# Patient Record
Sex: MALE | Race: BLACK OR AFRICAN AMERICAN | Employment: FULL TIME | URBAN - METROPOLITAN AREA
[De-identification: names, ages, dates, MRNs, and addresses within clinical notes are randomized per-mention and may not be internally consistent; named-entity substitution may affect disease eponyms.]

---

## 2019-01-22 ENCOUNTER — HOSPITAL ENCOUNTER (OUTPATIENT)
Dept: PHYSICAL THERAPY | Age: 54
Discharge: HOME | End: 2019-01-22
Payer: COMMERCIAL

## 2019-01-22 PROCEDURE — 97110 THERAPEUTIC EXERCISES: CPT

## 2019-01-22 PROCEDURE — 97161 PT EVAL LOW COMPLEX 20 MIN: CPT
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
## 2019-01-29 ENCOUNTER — HOSPITAL ENCOUNTER (OUTPATIENT)
Dept: PHYSICAL THERAPY | Age: 54
Discharge: HOME | End: 2019-01-29
Payer: COMMERCIAL

## 2019-01-29 PROCEDURE — 97110 THERAPEUTIC EXERCISES: CPT | Performed by: PHYSICAL THERAPIST

## 2019-01-29 NOTE — PROGRESS NOTES
[facility]  [address], [address] [facility]    OUTPATIENT physical Therapy DAILY Treatment NOTe  Visit: 2    NAME: Bill Daly AGE: 52 y.o. GENDER: male  DATE: 1/29/2019  REFERRING PHYSICIAN: Xavier Burgess, *      GOALS  Short term goals  Time frame: 2 weeks  1. Patient will be compliant and independent with the initial HEP as evidenced by being able to perform without cuing. 2. Patient will report a 50% improvement in symptoms. 3. Patient will have a 10 degree increase in left knee flexion ROM   4. Patient will tolerate 15 minutes of clinic activities before onset of symptoms.      Long term goals  Time frame: 4 weeks  1. Patient will have an improved score on the LEFS outcome measure by 12 points to demonstrate an increase in functional activity tolerance. 2. Patient will be independent in final individualized HEP. 3. Patient will return to work without being limited by symptoms. SUBJECTIVE:   \"It feels tight, but it's a lot better. \"    Pain In: 0/10 left knee    OBJECTIVE DATA SUMMARY:   Objective data from initial evaluation:  EXAMINATION/PRESENTATION/DECISION MAKING:   Pain:  Location: No report of pain in left knee at this time     Posture:   Standing posture WNL, equal wt on each LE     Strength:   Pt reports general feeling of weakness in left LE     Range of Motion:   Left knee   Flexion 105 degrees  Extension 0 degrees     Right knee  Flexion 115 degrees  Extension 0      Spinal Assessment: WNL     Joint Mobility:   Left patella mobility WNL     Palpation:   Not TTP along left knee joint line     Neurologic Assessment:               Tone: WNL               Sensation: WNL               Reflexes: not tested     Special Tests:      Mobility:               Transitional Movements: WNL                Gait: WNL     Balance: WNL     Functional Measure:    The Lower Extremity functional Scale  64/80     TREATMENT/INTERVENTION:  Modalities (Rationale): none     Therapeutic Exercises:  HEP: Prone self flexion mob, prone hip extension, SLR, Adductor stretch, Mini squat with ball squeeze, steps with hip flexion, Hip abduction, extension and flexion with green TB (dispensed)     Exercises in BOLD performed this date:     UBE/LBE: 7 minutes, level 2 for warm-up    Prone left self flexion stretch: 3 reps with 30 second holds  Prone hip extension (with knee bent): 2 sets of 10 reps each  Prone hip extension (with knee straight): 10 reps each    SLR: 2 sets of 10 reps  VMO SLR: 10 reps  Hip Adductor stretch    Terminal knee extension with green TB: 2 sets of 10re  Mini squats with green TB: 2 sets of 10 reps to target of 8 risers to improve form  Step ups on green step with 2 risers: 10 reps  Side step ups on green step with 2 risers: 2 sets of 10 reps  Hip abduction, extension and flexion with green TB: 2 sets of 10 reps each     Manual Therapy:  Patella mob med/lat, sup/inf     Neuro Re-Education:  None this session, could benefit from SLS and balance exercises with left LE    Activity tolerance and post treatment pain report:   Good  Pain Out: 0/10    Education:  Education was provided to the patient on the following topics. []    No changes were made to the home exercise program.  [x]    The following changes were made to the home exercise program: step ups and side step ups, and mini squats with green TB  Patient verbalized understanding of the topics presented. ASSESSMENT:   Patient returns following initial evaluation. Patient with partial tear of left quadriceps tendon after slipping on snow/ice in December. Patient presents with no left knee pain. He just reports tightness now. Patient with good adherence to HEP. Patient required cues for form with some exercises, especially mini squats. Patient with good form after repeated practice. Patient enjoyed UBE/LBE. Patient very motivated to return to work. Expect good progress.      Patients progression toward goals is as follows:  [x]     Improving appropriately and progressing toward goals  []     Improving slowly and progressing toward goals  []     Not making progress toward goals and plan of care will be adjusted    PLAN OF CARE:   Patient continues to benefit from skilled intervention to address the above impairments. [x]    Continue treatment per established plan of care.   []     Recommend the following changes to the plan of care    Recommendations/Intent for next treatment: side steps with TB    Tone Fitzgerald, PT   Time Calculation: 46 mins  Patient Time in clinic:   Start Time: 0930   Stop Time: [phone]

## 2019-02-05 ENCOUNTER — HOSPITAL ENCOUNTER (OUTPATIENT)
Dept: PHYSICAL THERAPY | Age: 54
Discharge: HOME | End: 2019-02-05
Payer: COMMERCIAL

## 2019-02-05 PROCEDURE — 97110 THERAPEUTIC EXERCISES: CPT

## 2019-02-05 NOTE — PROGRESS NOTES
[facility] (formerly [facility])  [address], [address] [facility]    OUTPATIENT physical Therapy DAILY Treatment NOTe  Visit: 3    NAME: Bill Daly AGE: 52 y.o. GENDER: male  DATE: 2/5/2019  REFERRING PHYSICIAN: Xavier Burgess, *      GOALS  Short term goals  Time frame: 2 weeks  1. Patient will be compliant and independent with the initial HEP as evidenced by being able to perform without cuing. 2. Patient will report a 50% improvement in symptoms. 3. Patient will have a 10 degree increase in left knee flexion ROM   4. Patient will tolerate 15 minutes of clinic activities before onset of symptoms.      Long term goals  Time frame: 4 weeks  1. Patient will have an improved score on the LEFS outcome measure by 12 points to demonstrate an increase in functional activity tolerance. 2. Patient will be independent in final individualized HEP. 3. Patient will return to work without being limited by symptoms. SUBJECTIVE:   \"It feels tight, but it's a lot better. \"    Pain In: 0/10 left knee    OBJECTIVE DATA SUMMARY:   Objective data from initial evaluation:  EXAMINATION/PRESENTATION/DECISION MAKING:   Pain:  Location: No report of pain in left knee at this time     Posture:   Standing posture WNL, equal wt on each LE     Strength:   Pt reports general feeling of weakness in left LE     Range of Motion:   Left knee   Flexion 105 degrees  Extension 0 degrees     Right knee  Flexion 115 degrees  Extension 0      Spinal Assessment: WNL     Joint Mobility:   Left patella mobility WNL     Palpation:   Not TTP along left knee joint line     Neurologic Assessment:               Tone: WNL               Sensation: WNL               Reflexes: not tested     Special Tests:      Mobility:               Transitional Movements: WNL                Gait: WNL     Balance: WNL     Functional Measure:    The Lower Extremity functional Scale  64/80     TREATMENT/INTERVENTION:  Modalities (Rationale): none     Therapeutic Exercises:  HEP: Prone self flexion mob, prone hip extension, SLR, Adductor stretch, Mini squat with ball squeeze, steps with hip flexion, Hip abduction, extension and flexion with green TB (dispensed)     Exercises in BOLD performed this date:     UBE/LBE: 8 minutes, level 2 for warm-up    Prone left self flexion stretch: 3 reps with 30 second holds  Prone hip extension (with knee bent): 2 sets of 10 reps each  Prone hip extension (with knee straight): 10 reps each    SLR: 2 sets of 10 reps  VMO SLR: 10 reps  Hip Adductor stretch    Seated   LAQ 2# x 10 reps    Terminal knee extension with green TB: 2 sets of 10re  Mini squats with green TB: 2 sets of 10 reps to target of 8 risers to improve form  Step ups on green step with 2 risers: 10 reps  Side step ups on green step with 2 risers: 2 sets of 10 reps  Hip abduction, extension and flexion with green TB: 2 sets of 10 reps each     Manual Therapy:  Patella mob med/lat, sup/inf     Neuro Re-Education:  None this session, could benefit from SLS and balance exercises with left LE    Activity tolerance and post treatment pain report:   Good  Pain Out: 0/10    Education:  Education was provided to the patient on the following topics. []    No changes were made to the home exercise program.  [x]    The following changes were made to the home exercise program: step ups and side step ups, and mini squats with green TB  Patient verbalized understanding of the topics presented. ASSESSMENT:    Patient with partial tear of left quadriceps tendon after slipping on snow/ice in December. Patient presents with no left knee pain, just reports tightness. . Patient with good adherence to HEP, he required cues for form with some exercises, especially mini squats. Patient enjoyed UBE/LBE. Patient very motivated to return to work.  Pt to come to Therapy one more time this week  and then check with Dr about possible RTW    Patients progression toward goals is as follows:  [x]     Improving appropriately and progressing toward goals  []     Improving slowly and progressing toward goals  []     Not making progress toward goals and plan of care will be adjusted    PLAN OF CARE:   Patient continues to benefit from skilled intervention to address the above impairments. [x]    Continue treatment per established plan of care.   []     Recommend the following changes to the plan of care    Recommendations/Intent for next treatment: Increase repetitions of exercises    Howard Rodarte PT   Time Calculation: 55 mins  Patient Time in clinic:   Start Time: 0930   Stop Time: 7500

## 2019-02-08 ENCOUNTER — HOSPITAL ENCOUNTER (OUTPATIENT)
Dept: PHYSICAL THERAPY | Age: 54
Discharge: HOME | End: 2019-02-08
Payer: COMMERCIAL

## 2019-02-08 PROCEDURE — 97110 THERAPEUTIC EXERCISES: CPT | Performed by: PHYSICAL THERAPIST

## 2019-02-08 NOTE — PROGRESS NOTES
[facility]  [address], [address] [facility]    OUTPATIENT physical Therapy DAILY Treatment NOTe  Visit: 4    NAME: Bill Daly AGE: 52 y.o. GENDER: male  DATE: 2/8/2019  REFERRING PHYSICIAN: Xavier Burgess, *      GOALS  Short term goals  Time frame: 2 weeks  1. Patient will be compliant and independent with the initial HEP as evidenced by being able to perform without cuing. MET  2. Patient will report a 50% improvement in symptoms. MET  3. Patient will have a 10 degree increase in left knee flexion ROM  MET  4. Patient will tolerate 15 minutes of clinic activities before onset of symptoms. MET     Long term goals  Time frame: 4 weeks  1. Patient will have an improved score on the LEFS outcome measure by 12 points to demonstrate an increase in functional activity tolerance. MET  2. Patient will be independent in final individualized HEP. MET  3. Patient will return to work without being limited by symptoms. MET       SUBJECTIVE:   \"It feels good. It's not painful or tight anymore. \"    Pain In: 0/10 left knee    OBJECTIVE DATA SUMMARY:     EXAMINATION/PRESENTATION/DECISION MAKING:   Pain:  Location: No report of pain in left knee at this time     Posture:   Equal weightbearing through BLE     Strength:      LEFT  RIGHT  Knee flexion  5/5  5/5  Knee extension  5/5  5/5     Range of Motion:   Left knee AAROM in supine:  Flexion: 130 degrees  Extension: 0 degrees     Right knee AAROM in supine:  Flexion: 130 degrees  Extension: 0 degrees     Spinal Assessment: WNL     Joint Mobility:   Left patella mobility WNL     Palpation:   No tenderness along left knee joint line     Neurologic Assessment:               Tone: WNL               Sensation: WNL               Reflexes: not tested     Mobility:               Transitional Movements: WNL                Gait: WNL     Balance:   WNL     Functional Measure:   LEFS: 64/80 on evaluation  LEFS: 80/80 on discharge     TREATMENT/INTERVENTION:  Modalities (Rationale): none     Therapeutic Exercises:  HEP: Prone self flexion mob, prone hip extension, SLR, Adductor stretch, Mini squat with ball squeeze, steps with hip flexion, Hip abduction, extension and flexion with green TB (dispensed)     Exercises in BOLD performed this date:     UBE/LBE: 8 minutes, level 2 for warm-up    Prone left self flexion stretch: 3 reps with 30 second holds  Prone hip extension (with knee bent): 2 sets of 10 reps each  Prone hip extension (with knee straight): 10 reps each    SLR: 2 sets of 10 reps  VMO SLR: 10 reps  Hip Adductor stretch    Seated   LAQ 2# x 10 reps    Terminal knee extension with blueTB: 2 sets of 10 reps  Mini squats with green TB: 2 sets of 10 reps to target of 8 risers to improve form  Step ups on green step with 2 risers: 10 reps  Side step ups on green step with 2 risers: 2 sets of 10 reps  Hip abduction, extension and flexion with green TB: 2 sets of 10 reps each     Manual Therapy:  Patella mob med/lat, sup/inf     Activity tolerance and post treatment pain report:   Good  Pain Out: 0/10    Education:  Education was provided to the patient on the following topics. []    No changes were made to the home exercise program.  [x]    The following changes were made to the home exercise program: continue HEP  Patient verbalized understanding of the topics presented. ASSESSMENT:   Patient with partial tear of left quadriceps tendon after slipping on snow/ice in December. He has completed 4 visits since evaluation on 1/22/19. Patient presents with no left knee pain or tightness today. Patient with good adherence to HEP. Patient instructed to continue HEP and icing at home, but discontinue squats due to no carryover with proper form. Patient remains very motivated to return to work. Patient to return to doctor next week about return to work, and call PT clinic about update.     Patients progression toward goals is as follows:  [x]     Improving appropriately and progressing toward goals  []     Improving slowly and progressing toward goals  []     Not making progress toward goals and plan of care will be adjusted    PLAN OF CARE:   Patient continues to benefit from skilled intervention to address the above impairments. [x]    Continue treatment per established plan of care.   []     Recommend the following changes to the plan of care    Recommendations/Intent for next treatment: patient to call if returning to work     Ana Laura Trivedi, PT   Time Calculation: 28 mins  Patient Time in clinic:   Start Time: [address]   Stop Time: [phone]

## 2019-04-22 NOTE — PROGRESS NOTES
[facility]  [address], [address] [facility]    OUTPATIENT physical Therapy discharge note      4/22/2019:  Patient will be discharged from physical therapy at this time. Criteria for termination of care:    []           Patient has plateaued  []           Patient has not returned to therapy  []           Patient has missed three or more visits without prior notification  [x]           Other: no pain; returning to work    Patient was seen for 4 visits from 1/22/19 to 2/8/19. Please refer to the most recent progress note for the latest PT info available. If you need anything further faxed to you, please contact us at [phone].     Thank you for this referral.  Bob Farfan, PT

## 2019-04-22 NOTE — PROGRESS NOTES
[address] OUTPATIENT physical Therapy discharge note 4/22/2019: 
Patient will be discharged from physical therapy at this time. Criteria for termination of care: 
 
[]           Patient has plateaued 
[]           Patient has not returned to therapy 
[]           Patient has missed three or more visits without prior notification 
[x]           Other: no pain; returning to work Patient was seen for 4 visits from 1/22/19 to 2/8/19. Please refer to the most recent progress note for the latest PT info available. If you need anything further faxed to you, please contact us at [phone].  
 
Thank you for this referral. 
Jason Hunter, PT

## 2019-10-31 ENCOUNTER — APPOINTMENT (OUTPATIENT)
Dept: CT IMAGING | Age: 54
End: 2019-10-31
Attending: STUDENT IN AN ORGANIZED HEALTH CARE EDUCATION/TRAINING PROGRAM
Payer: COMMERCIAL

## 2019-10-31 ENCOUNTER — HOSPITAL ENCOUNTER (OUTPATIENT)
Age: 54
Setting detail: OBSERVATION
Discharge: HOME | End: 2019-11-02
Attending: STUDENT IN AN ORGANIZED HEALTH CARE EDUCATION/TRAINING PROGRAM | Admitting: HOSPITALIST
Payer: COMMERCIAL

## 2019-10-31 DIAGNOSIS — R47.89 WORD FINDING DIFFICULTY: Primary | ICD-10-CM

## 2019-10-31 DIAGNOSIS — G40.109 LOCALZ-RLTD SYMPTOMATIC EPILEPSY W SMPL PART SZ, NONINTRACT, WO STATUS (HCC): ICD-10-CM

## 2019-10-31 DIAGNOSIS — Z87.820 HISTORY OF TRAUMATIC BRAIN INJURY: ICD-10-CM

## 2019-10-31 DIAGNOSIS — G81.91 RIGHT HEMIPLEGIA (HCC): ICD-10-CM

## 2019-10-31 DIAGNOSIS — R47.1 DYSARTHRIA: ICD-10-CM

## 2019-10-31 LAB
ALBUMIN SERPL-MCNC: 4 G/DL (ref 3.5–5)
ALBUMIN/GLOB SERPL: 1 {RATIO} (ref 1.1–2.2)
ALP SERPL-CCNC: 75 U/L (ref 45–117)
ALT SERPL-CCNC: 51 U/L (ref 12–78)
ANION GAP SERPL CALC-SCNC: 6 MMOL/L (ref 5–15)
AST SERPL-CCNC: 31 U/L (ref 15–37)
BASOPHILS # BLD: 0.1 K/UL (ref 0–0.1)
BASOPHILS NFR BLD: 1 % (ref 0–1)
BILIRUB SERPL-MCNC: 0.5 MG/DL (ref 0.2–1)
BUN SERPL-MCNC: 10 MG/DL (ref 6–20)
BUN/CREAT SERPL: 10 (ref 12–20)
CALCIUM SERPL-MCNC: 9.1 MG/DL (ref 8.5–10.1)
CHLORIDE SERPL-SCNC: 105 MMOL/L (ref 97–108)
CO2 SERPL-SCNC: 29 MMOL/L (ref 21–32)
CREAT SERPL-MCNC: 0.98 MG/DL (ref 0.7–1.3)
DIFFERENTIAL METHOD BLD: ABNORMAL
EOSINOPHIL # BLD: 0.1 K/UL (ref 0–0.4)
EOSINOPHIL NFR BLD: 1 % (ref 0–7)
ERYTHROCYTE [DISTWIDTH] IN BLOOD BY AUTOMATED COUNT: 12 % (ref 11.5–14.5)
GLOBULIN SER CALC-MCNC: 4.2 G/DL (ref 2–4)
GLUCOSE BLD STRIP.AUTO-MCNC: 87 MG/DL (ref 65–100)
GLUCOSE SERPL-MCNC: 83 MG/DL (ref 65–100)
HCT VFR BLD AUTO: 42.2 % (ref 36.6–50.3)
HGB BLD-MCNC: 14.3 G/DL (ref 12.1–17)
IMM GRANULOCYTES # BLD AUTO: 0 K/UL (ref 0–0.04)
IMM GRANULOCYTES NFR BLD AUTO: 0 % (ref 0–0.5)
INR PPP: 1.1 (ref 0.9–1.1)
LYMPHOCYTES # BLD: 2.6 K/UL (ref 0.8–3.5)
LYMPHOCYTES NFR BLD: 42 % (ref 12–49)
MCH RBC QN AUTO: 34.1 PG (ref 26–34)
MCHC RBC AUTO-ENTMCNC: 33.9 G/DL (ref 30–36.5)
MCV RBC AUTO: 100.7 FL (ref 80–99)
MONOCYTES # BLD: 0.7 K/UL (ref 0–1)
MONOCYTES NFR BLD: 11 % (ref 5–13)
NEUTS SEG # BLD: 2.8 K/UL (ref 1.8–8)
NEUTS SEG NFR BLD: 45 % (ref 32–75)
NRBC # BLD: 0 K/UL (ref 0–0.01)
NRBC BLD-RTO: 0 PER 100 WBC
PLATELET # BLD AUTO: 163 K/UL (ref 150–400)
PMV BLD AUTO: 10 FL (ref 8.9–12.9)
POTASSIUM SERPL-SCNC: 3.6 MMOL/L (ref 3.5–5.1)
PROT SERPL-MCNC: 8.2 G/DL (ref 6.4–8.2)
PROTHROMBIN TIME: 11.3 SEC (ref 9–11.1)
RBC # BLD AUTO: 4.19 M/UL (ref 4.1–5.7)
SERVICE CMNT-IMP: NORMAL
SODIUM SERPL-SCNC: 140 MMOL/L (ref 136–145)
WBC # BLD AUTO: 6.1 K/UL (ref 4.1–11.1)

## 2019-10-31 PROCEDURE — 36415 COLL VENOUS BLD VENIPUNCTURE: CPT

## 2019-10-31 PROCEDURE — 82962 GLUCOSE BLOOD TEST: CPT

## 2019-10-31 PROCEDURE — 80053 COMPREHEN METABOLIC PANEL: CPT

## 2019-10-31 PROCEDURE — 99218 HC RM OBSERVATION: CPT

## 2019-10-31 PROCEDURE — 74011636320 HC RX REV CODE- 636/320: Performed by: RADIOLOGY

## 2019-10-31 PROCEDURE — 85610 PROTHROMBIN TIME: CPT

## 2019-10-31 PROCEDURE — 74011250636 HC RX REV CODE- 250/636: Performed by: STUDENT IN AN ORGANIZED HEALTH CARE EDUCATION/TRAINING PROGRAM

## 2019-10-31 PROCEDURE — 83036 HEMOGLOBIN GLYCOSYLATED A1C: CPT

## 2019-10-31 PROCEDURE — 70496 CT ANGIOGRAPHY HEAD: CPT

## 2019-10-31 PROCEDURE — 74011250637 HC RX REV CODE- 250/637: Performed by: HOSPITALIST

## 2019-10-31 PROCEDURE — 0042T CT CODE NEURO PERF W CBF: CPT

## 2019-10-31 PROCEDURE — 93005 ELECTROCARDIOGRAM TRACING: CPT

## 2019-10-31 PROCEDURE — 70450 CT HEAD/BRAIN W/O DYE: CPT

## 2019-10-31 PROCEDURE — 74011000258 HC RX REV CODE- 258: Performed by: RADIOLOGY

## 2019-10-31 PROCEDURE — 99285 EMERGENCY DEPT VISIT HI MDM: CPT

## 2019-10-31 PROCEDURE — 80061 LIPID PANEL: CPT

## 2019-10-31 PROCEDURE — 96374 THER/PROPH/DIAG INJ IV PUSH: CPT

## 2019-10-31 PROCEDURE — 85025 COMPLETE CBC W/AUTO DIFF WBC: CPT

## 2019-10-31 PROCEDURE — 94762 N-INVAS EAR/PLS OXIMTRY CONT: CPT

## 2019-10-31 RX ORDER — DIPHENHYDRAMINE HYDROCHLORIDE 50 MG/ML
25 INJECTION, SOLUTION INTRAMUSCULAR; INTRAVENOUS
Status: COMPLETED | OUTPATIENT
Start: 2019-10-31 | End: 2019-10-31

## 2019-10-31 RX ORDER — ACETAMINOPHEN 325 MG/1
650 TABLET ORAL
Status: DISCONTINUED | OUTPATIENT
Start: 2019-10-31 | End: 2019-11-02 | Stop reason: HOSPADM

## 2019-10-31 RX ORDER — LORAZEPAM 1 MG/1
4 TABLET ORAL
Status: DISCONTINUED | OUTPATIENT
Start: 2019-10-31 | End: 2019-11-02 | Stop reason: HOSPADM

## 2019-10-31 RX ORDER — ASPIRIN 81 MG/1
81 TABLET ORAL DAILY
COMMUNITY
End: 2022-05-22

## 2019-10-31 RX ORDER — SODIUM CHLORIDE 0.9 % (FLUSH) 0.9 %
10 SYRINGE (ML) INJECTION
Status: COMPLETED | OUTPATIENT
Start: 2019-10-31 | End: 2019-10-31

## 2019-10-31 RX ORDER — ACETAMINOPHEN 650 MG/1
650 SUPPOSITORY RECTAL
Status: DISCONTINUED | OUTPATIENT
Start: 2019-10-31 | End: 2019-11-02 | Stop reason: HOSPADM

## 2019-10-31 RX ORDER — LABETALOL HYDROCHLORIDE 5 MG/ML
5 INJECTION, SOLUTION INTRAVENOUS
Status: DISCONTINUED | OUTPATIENT
Start: 2019-10-31 | End: 2019-11-02 | Stop reason: HOSPADM

## 2019-10-31 RX ORDER — GUAIFENESIN 100 MG/5ML
81 LIQUID (ML) ORAL DAILY
Status: DISCONTINUED | OUTPATIENT
Start: 2019-10-31 | End: 2019-11-02 | Stop reason: HOSPADM

## 2019-10-31 RX ORDER — LORAZEPAM 1 MG/1
2 TABLET ORAL
Status: DISCONTINUED | OUTPATIENT
Start: 2019-10-31 | End: 2019-11-02 | Stop reason: HOSPADM

## 2019-10-31 RX ORDER — LANOLIN ALCOHOL/MO/W.PET/CERES
100 CREAM (GRAM) TOPICAL DAILY
Status: DISCONTINUED | OUTPATIENT
Start: 2019-10-31 | End: 2019-11-02 | Stop reason: HOSPADM

## 2019-10-31 RX ADMIN — IOPAMIDOL 120 ML: 755 INJECTION, SOLUTION INTRAVENOUS at 18:40

## 2019-10-31 RX ADMIN — Medication 10 ML: at 18:40

## 2019-10-31 RX ADMIN — DIPHENHYDRAMINE HYDROCHLORIDE 25 MG: 50 INJECTION, SOLUTION INTRAMUSCULAR; INTRAVENOUS at 19:26

## 2019-10-31 RX ADMIN — SODIUM CHLORIDE 100 ML: 900 INJECTION, SOLUTION INTRAVENOUS at 18:40

## 2019-10-31 RX ADMIN — Medication 100 MG: at 22:33

## 2019-10-31 RX ADMIN — ASPIRIN 81 MG 81 MG: 81 TABLET ORAL at 22:34

## 2019-10-31 NOTE — LETTER
[address] [id number] [phone] Work/School Note Date: 10/31/2019 To Whom It May concern: 
 
Bill Daly was seen and treated today in the emergency room by the following provider(s): 
Attending Provider: Kristopher Huerta MD.   
Sincerely, José Miguel Cervantes RN

## 2019-10-31 NOTE — ED PROVIDER NOTES
62 y.o. Right-handed male with unknown past medical history who presents from home via private vehicle accompanied by wife with chief complaint of difficulty with speech. Per wife, pt had trouble finding his words while on the phone with him this morning at 1130. Yesterday when she spoke with him on the phone at 1800 his speech was normal. She also reports that pt had abnormal gait a few days ago, noting that he is leaning to the right when he walks. Pt states that he has not noted any changes. Specifically denies any other pain or symptoms. There are no other acute medical concerns at this time. Social hx: Never tobacco smoker; Denies EtOH use; Denies illicit drug use    Note written by Laura Richards, as dictated by Aron Romano MD 6:25 PM    The history is provided by the patient and the spouse. No  was used. History reviewed. No pertinent past medical history. Past Surgical History:   Procedure Laterality Date    NEUROLOGICAL PROCEDURE UNLISTED           History reviewed. No pertinent family history.     Social History     Socioeconomic History    Marital status:      Spouse name: Not on file    Number of children: Not on file    Years of education: Not on file    Highest education level: Not on file   Occupational History    Not on file   Social Needs    Financial resource strain: Not on file    Food insecurity:     Worry: Not on file     Inability: Not on file    Transportation needs:     Medical: Not on file     Non-medical: Not on file   Tobacco Use    Smoking status: Never Smoker    Smokeless tobacco: Never Used   Substance and Sexual Activity    Alcohol use: No    Drug use: No    Sexual activity: Not on file   Lifestyle    Physical activity:     Days per week: Not on file     Minutes per session: Not on file    Stress: Not on file   Relationships    Social connections:     Talks on phone: Not on file     Gets together: Not on file Attends Latter day service: Not on file     Active member of club or organization: Not on file     Attends meetings of clubs or organizations: Not on file     Relationship status: Not on file    Intimate partner violence:     Fear of current or ex partner: Not on file     Emotionally abused: Not on file     Physically abused: Not on file     Forced sexual activity: Not on file   Other Topics Concern    Not on file   Social History Narrative    Not on file         ALLERGIES: Patient has no known allergies. Review of Systems   Constitutional: Negative for chills, fatigue and fever. HENT: Negative for ear pain, sore throat and trouble swallowing. Eyes: Negative for visual disturbance. Respiratory: Negative for cough and shortness of breath. Cardiovascular: Negative for chest pain. Gastrointestinal: Negative for abdominal pain. Genitourinary: Negative for dysuria. Musculoskeletal: Positive for gait problem. Negative for back pain. Skin: Negative for rash. Neurological: Positive for speech difficulty. Negative for light-headedness and headaches. Psychiatric/Behavioral: Negative for confusion. All other systems reviewed and are negative. Vitals:    10/31/19 1819   BP: (!) 155/91   Pulse: 77   Resp: 18   SpO2: 97%            Physical Exam   Constitutional: He appears well-developed. HENT:   Head: Normocephalic and atraumatic. Eyes: EOM are normal.   Neck: No neck rigidity. Cardiovascular: Intact distal pulses. Pulmonary/Chest: Effort normal.   Abdominal: He exhibits no distension. There is no tenderness. Musculoskeletal: He exhibits no edema. Neurological: He is alert. No cranial nerve deficit. Right facial droop, occasional word finding difficulty, slight weakness with active range of motion of his right upper extremity greater than his right lower extremity. Normal sensation light touch. Normal finger-to-nose and heel shin testing. Skin: Skin is warm.  He is not diaphoretic. Psychiatric: He has a normal mood and affect. Nursing note and vitals reviewed. MDM  Number of Diagnoses or Management Options  Right hemiplegia ([address]): Word finding difficulty:   Diagnosis management comments: Patient with poor medical follow-up, no known medical issues presenting with right facial droop, right hemiplegia and word finding difficulty. No evidence of hemorrhage or large vessel occlusion. Plan to admit for further evaluation. Procedures      CONSULT NOTE:  6:39 PM Eileen Mackenzie MD spoke with Dr. Danis Dutton, Consult for Teleneurology. Discussed available diagnostic tests and clinical findings. Dr. Danis Dutton states that pt is outside of the window for TPA. Hospitalist Fernando Text for Admission  8:10 PM    ED Room Number: ER18/18  Patient Name and age:  Dane Teague 62 y.o.  male  Working Diagnosis:   1. Word finding difficulty    2.  Right hemiplegia ([address]      Readmission: no  Isolation Requirements:  no  Recommended Level of Care:  telemetry  Code Status:  full  Other:    Department:[facility] Adult ED - [phone]

## 2019-10-31 NOTE — ED TRIAGE NOTES
Pt reports he woke up normal and at 11:30 he had a conversation over the phone with his wife and she noticed he was having difficulty finding his words.

## 2019-11-01 ENCOUNTER — APPOINTMENT (OUTPATIENT)
Dept: NON INVASIVE DIAGNOSTICS | Age: 54
End: 2019-11-01
Attending: HOSPITALIST
Payer: COMMERCIAL

## 2019-11-01 LAB
ATRIAL RATE: 63 BPM
AV VELOCITY RATIO: 0.67
CALCULATED P AXIS, ECG09: 28 DEGREES
CALCULATED R AXIS, ECG10: 33 DEGREES
CALCULATED T AXIS, ECG11: 20 DEGREES
CHOLEST SERPL-MCNC: 167 MG/DL
DIAGNOSIS, 93000: NORMAL
ECHO AO ROOT DIAM: 3.25 CM
ECHO AV AREA PEAK VELOCITY: 2.5 CM2
ECHO AV PEAK GRADIENT: 4.4 MMHG
ECHO AV PEAK VELOCITY: 105.1 CM/S
ECHO LA AREA 4C: 18.2 CM2
ECHO LA MAJOR AXIS: 4.14 CM
ECHO LA TO AORTIC ROOT RATIO: 1.28
ECHO LA VOL 2C: 45.81 ML (ref 18–58)
ECHO LA VOL 4C: 43.74 ML (ref 18–58)
ECHO LA VOL BP: 47.4 ML (ref 18–58)
ECHO LA VOL/BSA BIPLANE: 22.9 ML/M2 (ref 16–28)
ECHO LA VOLUME INDEX A2C: 22.13 ML/M2 (ref 16–28)
ECHO LA VOLUME INDEX A4C: 21.13 ML/M2 (ref 16–28)
ECHO LV E' LATERAL VELOCITY: 7.42 CM/S
ECHO LV E' SEPTAL VELOCITY: 8.12 CM/S
ECHO LV INTERNAL DIMENSION DIASTOLIC: 5.01 CM (ref 4.2–5.9)
ECHO LV INTERNAL DIMENSION SYSTOLIC: 3.36 CM
ECHO LV IVSD: 0.93 CM (ref 0.6–1)
ECHO LV MASS 2D: 190.9 G (ref 88–224)
ECHO LV MASS INDEX 2D: 92.2 G/M2 (ref 49–115)
ECHO LV POSTERIOR WALL DIASTOLIC: 0.92 CM (ref 0.6–1)
ECHO LVOT DIAM: 2.17 CM
ECHO LVOT PEAK GRADIENT: 2 MMHG
ECHO LVOT PEAK VELOCITY: 70.24 CM/S
ECHO MV A VELOCITY: 42.77 CM/S
ECHO MV AREA PHT: 3.5 CM2
ECHO MV E DECELERATION TIME (DT): 214.4 MS
ECHO MV E VELOCITY: 46 CM/S
ECHO MV E/A RATIO: 1.08
ECHO MV E/E' LATERAL: 6.2
ECHO MV E/E' RATIO (AVERAGED): 5.93
ECHO MV E/E' SEPTAL: 5.67
ECHO MV PRESSURE HALF TIME (PHT): 62.2 MS
ECHO PV MAX VELOCITY: 111.5 CM/S
ECHO PV PEAK GRADIENT: 5 MMHG
ECHO RV TAPSE: 1.69 CM (ref 1.5–2)
EST. AVERAGE GLUCOSE BLD GHB EST-MCNC: 117 MG/DL
HBA1C MFR BLD: 5.7 % (ref 4.2–6.3)
HDLC SERPL-MCNC: 66 MG/DL
HDLC SERPL: 2.5 {RATIO} (ref 0–5)
LDLC SERPL CALC-MCNC: 72.4 MG/DL (ref 0–100)
LIPID PROFILE,FLP: NORMAL
LVFS 2D: 32.85 %
MV DEC SLOPE: 2.15
P-R INTERVAL, ECG05: 162 MS
PV END DIASTOLIC VELOCITY: 0.6 MMHG
Q-T INTERVAL, ECG07: 396 MS
QRS DURATION, ECG06: 88 MS
QTC CALCULATION (BEZET), ECG08: 405 MS
TRIGL SERPL-MCNC: 143 MG/DL (ref ?–150)
VENTRICULAR RATE, ECG03: 63 BPM
VLDLC SERPL CALC-MCNC: 28.6 MG/DL

## 2019-11-01 PROCEDURE — 97161 PT EVAL LOW COMPLEX 20 MIN: CPT

## 2019-11-01 PROCEDURE — 93306 TTE W/DOPPLER COMPLETE: CPT

## 2019-11-01 PROCEDURE — 97535 SELF CARE MNGMENT TRAINING: CPT

## 2019-11-01 PROCEDURE — 99218 HC RM OBSERVATION: CPT

## 2019-11-01 PROCEDURE — 95816 EEG AWAKE AND DROWSY: CPT | Performed by: PSYCHIATRY & NEUROLOGY

## 2019-11-01 PROCEDURE — 74011250637 HC RX REV CODE- 250/637: Performed by: HOSPITALIST

## 2019-11-01 PROCEDURE — 97116 GAIT TRAINING THERAPY: CPT

## 2019-11-01 PROCEDURE — 92610 EVALUATE SWALLOWING FUNCTION: CPT

## 2019-11-01 PROCEDURE — 97165 OT EVAL LOW COMPLEX 30 MIN: CPT

## 2019-11-01 PROCEDURE — 74011250637 HC RX REV CODE- 250/637: Performed by: PSYCHIATRY & NEUROLOGY

## 2019-11-01 RX ORDER — LEVETIRACETAM 500 MG/1
500 TABLET ORAL 2 TIMES DAILY
Status: DISCONTINUED | OUTPATIENT
Start: 2019-11-01 | End: 2019-11-02 | Stop reason: HOSPADM

## 2019-11-01 RX ADMIN — Medication 100 MG: at 21:26

## 2019-11-01 RX ADMIN — LEVETIRACETAM 500 MG: 500 TABLET ORAL at 10:00

## 2019-11-01 RX ADMIN — LEVETIRACETAM 500 MG: 500 TABLET ORAL at 18:14

## 2019-11-01 NOTE — PROGRESS NOTES
Speech Pathology bedside swallow evaluation/discharge  Patient: Peng Mantilla (58 y.o. male)  Date: 11/1/2019  Primary Diagnosis: Dysarthria [R47.1]       Precautions: n/a       ASSESSMENT :  Based on the objective data described below, the patient presents with functional oropharyngeal phases of swallow. Pt without overt difficulty/signs and symptoms of aspiration at bedside, and pt has been tolerating diet without significant difficulty. Patient does report that he feels that he sometimes chokes on food 2/2 globus endorsed at the level of the thyroid. States that it only happens sporadically. Therefore, educated pt on esophageal precautions and encouraged pt to pursue outpatient GI should situation worsen. Feel pt safe to continue diet as outlined below with general esophageal precautions. No speech, language, nor integrated language deficits appreciated. Skilled acute therapy provided by a speech-language pathologist is not indicated at this time. PLAN :  Recommendations:  --regular diet/thin liquids  --small sips/bites  --alternate liquids/solids  --fully upright during meals  --extra sauces, gravies, etc to soften foods  --follow-up outpatient GI if difficulties persist or worsen  Discharge Recommendations: None     SUBJECTIVE:   Patient stated, \"Oh man, I thought you were the person who would help me get up to walk. OBJECTIVE:   History reviewed. No pertinent past medical history.   Past Surgical History:   Procedure Laterality Date    NEUROLOGICAL PROCEDURE UNLISTED       Prior Level of Function/Home Situation:   Home Situation  Home Environment: Private residence  # Steps to Enter: 3  Rails to Enter: No  One/Two Story Residence: Two story  # of Interior Steps: 10  Living Alone: No  Support Systems: Spouse/Significant Other/Partner  Patient Expects to be Discharged to[de-identified] Private residence  Current DME Used/Available at Home: None  Diet prior to admission: Regular diet/thin liquids  Current Diet: Regular diet/thin liquids  Cognitive and Communication Status:  Neurologic State: Alert  Orientation Level: Oriented X4  Cognition: Appropriate decision making, Appropriate for age attention/concentration, Appropriate safety awareness  Perception: Appears intact  Perseveration: No perseveration noted  Safety/Judgement: Awareness of environment  Oral Assessment:  Oral Assessment  Labial: No impairment  Dentition: Natural  Oral Hygiene: oral mucosa moist and clear of secretions  Lingual: No impairment  Velum: No impairment  Mandible: No impairment  P.O. Trials:  Patient Position: upright in bed  Vocal quality prior to P.O.: No impairment  Consistency Presented: Solid; Thin liquid  How Presented: Self-fed/presented;Cup/sip;Straw     Bolus Acceptance: No impairment  Bolus Formation/Control: No impairment     Propulsion: No impairment  Oral Residue: None  Initiation of Swallow: No impairment  Laryngeal Elevation: Functional  Aspiration Signs/Symptoms: None  Pharyngeal Phase Characteristics: No impairment, issues, or problems   Effective Modifications: None          Oral Phase Severity: No impairment  Pharyngeal Phase Severity : No impairment  NOMS:   The NOMS functional outcome measure was used to quantify this patient's level of swallowing impairment. Based on the NOMS, the patient was determined to be at level 7 for swallow function     NOMS Swallowing Levels:  Level 1 (CN): NPO  Level 2 (CM): NPO but takes consistency in therapy  Level 3 (CL): Takes less than 50% of nutrition p.o. and continues with nonoral feedings; and/or safe with mod cues; and/or max diet restriction  Level 4 (CK):  Safe swallow but needs mod cues; and/or mod diet restriction; and/or still requires some nonoral feeding/supplements  Level 5 (CJ): Safe swallow with min diet restriction; and/or needs min cues  Level 6 (CI): Independent with p.o.; rare cues; usually self cues; may need to avoid some foods or needs extra time  Level 7 [facility]): Independent for all p.o.  SETH. (2003). National Outcomes Measurement System (NOMS): Adult Speech-Language Pathology User's Guide. Pain:  Pain Scale 1: Numeric (0 - 10)  Pain Intensity 1: 0     After treatment:   [] Patient left in no apparent distress sitting up in chair  [] Patient left in no apparent distress in bed  [x] Call bell left within reach  [x] Nursing notified  [] Caregiver present  [] Bed alarm activated    COMMUNICATION/EDUCATION:   The patients plan of care including findings, recommendations, and recommended diet changes were discussed with: Registered Nurse.    [] Posted safety precautions in patient's room. [x] Patient/family have participated as able and agree with findings and recommendations. [] Patient is unable to participate in plan of care at this time.     Thank you for this referral.  ANTONIA Rascon  Time Calculation: 9 mins

## 2019-11-01 NOTE — PROGRESS NOTES
Patient receiving EEG in room. Will follow up for evaluation after as appropriate. Thanks.     Trisha Way PT, DPT  Geriatric Clinical Specialist

## 2019-11-01 NOTE — ROUTINE PROCESS
TRANSFER - OUT REPORT:    Verbal report given to [facility]ITO (name) on Bill Daly  being transferred to NSTU(unit) for routine progression of care       Report consisted of patients Situation, Background, Assessment and   Recommendations(SBAR). Information from the following report(s) SBAR, ED Summary, Intake/Output, MAR and Recent Results was reviewed with the receiving nurse. Lines:   Peripheral IV 10/31/19 Left Forearm (Active)   Site Assessment Clean, dry, & intact 10/31/2019  6:40 PM   Phlebitis Assessment 0 10/31/2019  6:40 PM   Infiltration Assessment 0 10/31/2019  6:40 PM   Dressing Status Clean, dry, & intact 10/31/2019  6:40 PM   Hub Color/Line Status Pink 10/31/2019  6:40 PM        Opportunity for questions and clarification was provided.       Patient transported with:  Transport

## 2019-11-01 NOTE — PROGRESS NOTES
Problem: Falls - Risk of  Goal: *Absence of Falls  Description  Document Nury Patches Fall Risk and appropriate interventions in the flowsheet.   Outcome: Progressing Towards Goal  Note:   Fall Risk Interventions:            Medication Interventions: Evaluate medications/consider consulting pharmacy, Patient to call before getting OOB, Teach patient to arise slowly                   Problem: Patient Education: Go to Patient Education Activity  Goal: Patient/Family Education  Outcome: Progressing Towards Goal     Problem: Patient Education: Go to Patient Education Activity  Goal: Patient/Family Education  Outcome: Progressing Towards Goal     Problem: TIA/CVA Stroke: Day 2 Until Discharge  Goal: Activity/Safety  Outcome: Progressing Towards Goal  Goal: Diagnostic Test/Procedures  Outcome: Progressing Towards Goal  Goal: Nutrition/Diet  Outcome: Progressing Towards Goal  Goal: Discharge Planning  Outcome: Progressing Towards Goal  Goal: Medications  Outcome: Progressing Towards Goal  Goal: Respiratory  Outcome: Progressing Towards Goal  Goal: Treatments/Interventions/Procedures  Outcome: Progressing Towards Goal  Goal: Psychosocial  Outcome: Progressing Towards Goal  Goal: *Verbalizes anxiety and depression are reduced or absent  Outcome: Progressing Towards Goal  Goal: *Absence of aspiration  Outcome: Progressing Towards Goal  Goal: *Absence of deep venous thrombosis signs and symptoms(Stroke Metric)  Outcome: Progressing Towards Goal  Goal: *Optimal pain control at patient's stated goal  Outcome: Progressing Towards Goal  Goal: *Tolerating diet  Outcome: Progressing Towards Goal  Goal: *Ability to perform ADLs and demonstrates progressive mobility and function  Outcome: Progressing Towards Goal  Goal: *Stroke education continued(Stroke Metric)  Outcome: Progressing Towards Goal

## 2019-11-01 NOTE — CONSULTS
INPATIENT NEUROLOGY CONSULTATION  11/1/2019     Consulted by: Alyson Sharp MD        Patient ID:  Alonzo Campbell  [id number]  62 y.o.  1965    Chief Complaint   Patient presents with    Dysarthria       HPI    Yvonne Velazquez is a 78-year-old gentleman who tells me he has a history of traumatic brain injury about 6 years ago of which I have no records to review. He has had surgery to the left side of his head. 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 Per the records it seems like he has had surgery before that. He is here because yesterday he had some speech difficulty which was different from his baseline. He still does not feel back to his complete normal.  No headache or double vision. No weakness. 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 He was unaware of this. He does not have a history of seizures he knows of. He is never had a convulsive event. Review of Systems   Eyes: Negative for double vision. Neurological: Positive for speech change. Negative for headaches. All other systems reviewed and are negative. History reviewed. No pertinent past medical history. History reviewed. No pertinent family history.   Social History     Socioeconomic History    Marital status:      Spouse name: Not on file    Number of children: Not on file    Years of education: Not on file    Highest education level: Not on file   Occupational History    Not on file   Social Needs    Financial resource strain: Not on file    Food insecurity:     Worry: Not on file     Inability: Not on file    Transportation needs:     Medical: Not on file     Non-medical: Not on file   Tobacco Use    Smoking status: Never Smoker    Smokeless tobacco: Never Used   Substance and Sexual Activity    Alcohol use: No    Drug use: No    Sexual activity: Not on file Lifestyle    Physical activity:     Days per week: Not on file     Minutes per session: Not on file    Stress: Not on file   Relationships    Social connections:     Talks on phone: Not on file     Gets together: Not on file     Attends Baptism service: Not on file     Active member of club or organization: Not on file     Attends meetings of clubs or organizations: Not on file     Relationship status: Not on file    Intimate partner violence:     Fear of current or ex partner: Not on file     Emotionally abused: Not on file     Physically abused: Not on file     Forced sexual activity: Not on file   Other Topics Concern    Not on file   Social History Narrative    Not on file     Current Facility-Administered Medications   Medication Dose Route Frequency    levETIRAcetam (KEPPRA) tablet 500 mg  500 mg Oral BID    acetaminophen (TYLENOL) tablet 650 mg  650 mg Oral Q4H PRN    Or    acetaminophen (TYLENOL) solution 650 mg  650 mg Per NG tube Q4H PRN    Or    acetaminophen (TYLENOL) suppository 650 mg  650 mg Rectal Q4H PRN    aspirin chewable tablet 81 mg  81 mg Oral DAILY    labetalol (NORMODYNE;TRANDATE) injection 5 mg  5 mg IntraVENous Q10MIN PRN    LORazepam (ATIVAN) tablet 2 mg  2 mg Oral Q1H PRN    LORazepam (ATIVAN) tablet 4 mg  4 mg Oral Q1H PRN    thiamine HCL (B-1) tablet 100 mg  100 mg Oral DAILY     No Known Allergies    Visit Vitals  /81 (BP 1 Location: Right arm, BP Patient Position: At rest)   Pulse (!) 55   Temp 97.7 °F (36.5 °C)   Resp 16   Wt 91.4 kg (201 lb 9.6 oz)   SpO2 95%   BMI 29.77 kg/m²     Physical Exam   Constitutional: He appears well-developed and well-nourished. Cardiovascular: Normal rate. Pulmonary/Chest: Effort normal.   Skin: Skin is warm and dry. Vitals reviewed. Neurologic Exam     Mental Status   Adult gentleman awake and alert. He can give me good details about why he is here.   He does seem to struggle with completing sentences with some stutter. Pupils are equal, EOMI, conjugate gaze  Face has a very mild asymmetry on the right  Tongue is midline  Naming, repetition, comprehension all intact  Strength symmetric 5/5 throughout  Sensation intact throughout  No ataxia  Gait steady            Lab Results   Component Value Date/Time    WBC 6.1 10/31/2019 06:34 PM    HGB 14.3 10/31/2019 06:34 PM    HCT 42.2 10/31/2019 06:34 PM    PLATELET 675 30/16/3255 06:34 PM    .7 (H) 10/31/2019 06:34 PM     Lab Results   Component Value Date/Time    Hemoglobin A1c 5.7 10/31/2019 06:34 PM    Glucose 83 10/31/2019 06:34 PM    Glucose (POC) 87 10/31/2019 06:27 PM    LDL, calculated 72.4 10/31/2019 06:34 PM    Creatinine 0.98 10/31/2019 06:34 PM      Lab Results   Component Value Date/Time    Cholesterol, total 167 10/31/2019 06:34 PM    HDL Cholesterol 66 10/31/2019 06:34 PM    LDL, calculated 72.4 10/31/2019 06:34 PM    Triglyceride 143 10/31/2019 06:34 PM    CHOL/HDL Ratio 2.5 10/31/2019 06:34 PM     Lab Results   Component Value Date/Time    ALT (SGPT) 51 10/31/2019 06:34 PM    AST (SGOT) 31 10/31/2019 06:34 PM    Alk. phosphatase 75 10/31/2019 06:34 PM    Bilirubin, total 0.5 10/31/2019 06:34 PM    Albumin 4.0 10/31/2019 06:34 PM    Protein, total 8.2 10/31/2019 06:34 PM    INR 1.1 10/31/2019 06:34 PM    Prothrombin time 11.3 (H) 10/31/2019 06:34 PM    PLATELET 479 44/79/7674 06:34 PM        CT Results (maximum last 3): Results from [place] encounter on 10/31/19   CT CODE NEURO PERF W CBF    Narrative INDICATION: Dysarthria. EXAM: CT Perfusion with CBF. TECHNIQUE: During uneventful IV rapid bolus infusion of 40 mL Isovue-370, CT  brain perfusion was performed with generation of hemodynamic maps of multiple  parameters, including cerebral blood flow, cerebral blood volume and MTT (mean  transit time). Also TMAX.  CT dose reduction was achieved through use of a  standardized protocol tailored for this examination and automatic exposure  control for dose modulation. FINDINGS: There is expected diminished blood volume and flow in the left  cerebral chronic encephalomalacia site. There is otherwise symmetric perfusion  in the visualized portions of the cerebral hemispheres and cerebellum, with no  apparent acute finding. Impression IMPRESSION: No acute perfusion abnormality demonstrated. CTA CODE NEURO HEAD AND NECK W CONT    Narrative INDICATION:  Code Stroke     CTA Head and CTA Neck performed with helical axial imaging with bolus injection  of 100 mL Isovue 370 contrast with 3D post processing performed, with sagittal  and coronal MIPS provided. Postenhanced Head CT images provided. CT dose  reduction was achieved through the use of a standardized protocol tailored for  this examination and automatic exposure control for dose modulation. NECK:  Carotid Stenosis Assessment (NASCET criteria) Right 0%   Left:0%    Origin of the major brachiocephalic arteries from the aortic arch show no  significant stenosis. Vertebral arteries are patent, codominant and without  significant stenosis. Thyroid and neck soft tissues are unremarkable for age. HEAD:  Intracranial circulation shows no major vessel occlusion, intraluminal thrombus,  aneurysm, AVM or evidence of irregularity suggestive of vasculitis. Images of the brain show no bleed, mass, shift, hydrocephalus, or abnormal  enhancement. Impression IMPRESSION: No major vessel occlusion, aneurysm, dissection, intraluminal  thrombus, or significant stenosis. Assessment and Plan        61-year-old gentleman with history of reported traumatic brain injury and history of recurrent subdural hemorrhage on the left who presented with some word finding difficulty. On exam he does not have any aphasia but he still does not feel his baseline. After reviewing the previous EEG which was abnormal I have suspicion probably is having subclinical seizures.   Going to have him start [address] now. Obtain the MRI. If the MRI is without acute issue and he is feeling better I would recommend he stay on Keppra. If there is a stroke on the imaging he will need a formal stroke evaluation. If there is no acute infarct and or he is improved with Keppra, okay to discharge after being medically cleared. During this evaluation, we also discussed stroke education to include signs and symptoms of stroke and TIA. This clinical note was dictated with an electronic dictation software that can make unintentional errors. If there are any questions, please contact me directly for clarification.       [facility],   NEUROLOGIST  Diplomate [facility]  11/1/2019

## 2019-11-01 NOTE — PROGRESS NOTES
OCCUPATIONAL THERAPY EVALUATION/DISCHARGE  Patient: Abraham Castellanos (58 y.o. male)  Date: 11/1/2019  Primary Diagnosis: Dysarthria [R47.1]       Precautions: none       ASSESSMENT  Based on the objective data described below, the patient presents at independent functional baseline and does not require additional occupational therapy. Patient ambulated unit and performed ADL tasks in bathroom without difficulty. Patient receptive to [facility] education on signs/ symptoms of CVA and provided with handout. Current Level of Function (ADLs/self-care): Independent    Functional Outcome Measure: The patient scored Total A-D  Total A-D (Motor Function): 66/66 on the Fugl-Mathews Assessment with each UE which is indicative of no impairment in upper extremity functional status. PLAN :  Recommendation for discharge: (in order for the patient to meet his/her long term goals)  No skilled occupational therapy/ follow up rehabilitation needs identified at this time. This discharge recommendation:  Has been made in collaboration with the attending provider and/or case management       SUBJECTIVE:   Patient stated I'm fine.     OBJECTIVE DATA SUMMARY:   HISTORY:   History reviewed. No pertinent past medical history.   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[employer], lives with wife, using no AD  Expanded or extensive additional review of patient history:     Home Situation  Home Environment: Private residence  # Steps to Enter: 3  Rails to Enter: No  One/Two Story Residence: Two story  # of Interior Steps: 10  Living Alone: No  Support Systems: Spouse/Significant Other/Partner  Patient Expects to be Discharged to[de-identified] Private residence  Current DME Used/Available at Home: None    EXAMINATION OF PERFORMANCE DEFICITS:  Cognitive/Behavioral Status:  Neurologic State: Alert  Orientation Level: Oriented X4  Cognition: Appropriate for age attention/concentration; Appropriate safety awareness; Appropriate decision making; Follows commands  Perception: Appears intact  Perseveration: No perseveration noted  Safety/Judgement: Awareness of environment; Insight into deficits    Skin: visible skin appears intact    Edema: none noted    Hearing: Auditory  Auditory Impairment: None    Vision/Perceptual:    Tracking: Able to track stimulus in all quadrants w/o difficulty              Visual Fields: (able to detect in all fields)  Diplopia: No    Acuity: Within Defined Limits; Impaired near vision(at baseline)    Corrective Lenses: Glasses(not present)    Range of Motion:    AROM: Within functional limits                         Strength:    Strength: Within functional limits                Coordination:  Coordination: Within functional limits  Fine Motor Skills-Upper: Left Intact; Right Intact    Gross Motor Skills-Upper: Right Intact; Left Intact    Tone & Sensation:    Tone: Normal  Sensation: Intact                      Balance:  Sitting: Intact  Standing: Intact    Functional Mobility and Transfers for ADLs:  Bed Mobility:  Supine to Sit: Independent    Transfers:  Sit to Stand: Independent  Stand to Sit: Independent  Bathroom Mobility: Independent    ADL Assessment:  Feeding: Independent    Oral Facial Hygiene/Grooming: Independent    Bathing: Independent    Upper Body Dressing: Independent    Lower Body Dressing: Independent    Toileting: Independent                ADL Intervention and task modifications:       Cognitive Retraining  Safety/Judgement: Awareness of environment; Insight into deficits      Functional Measure:  Fugl-Mathews Assessment of Motor Recovery after Stroke:   Reflex Activity  Flexors/Biceps/Fingers: Can be elicited  Extensors/Triceps: Can be elicited  Reflex Subtotal: 4    Volitional Movement Within Synergies  Shoulder Retraction: Full  Shoulder Elevation: Full  Shoulder Abduction (90 degrees): Full  Shoulder External Rotation: Full  Elbow Flexion: Full  Forearm Supination: Full  Shoulder Adduction/Internal Rotation: Full  Elbow Extension: Full  Forearm Pronation: Full  Subtotal: 18    Volitional Movement Mixing Synergies  Hand to Lumbar Spine: Full  Shoulder Flexion (0-90 degrees): Full  Pronation-Supination: Full  Subtotal: 6    Volitional Movement With Little or No Synergy  Shoulder Abduction (0-90 degrees): Full  Shoulder Flexion ( degrees): Full  Pronation/Supination: Full  Subtotal : 6    Normal Reflex Activity  Biceps, Triceps, Finger Flexors: Full  Subtotal : 2    Upper Extremity Total   Upper Extremity Total: 36    Wrist  Stability at 15 Degree Dorsiflexion: Full  Repeated Dorsiflexion/ Volar Flexion: Full  Stability at 15 Degree Dorsiflexion: Full  Repeated Dorsiflexion/ Volar Flexion: Full  Circumduction: Full  Wrist Total: 10    Hand  Mass Flexion: Full  Mass Extension: Full  Grasp A: Full  Grasp B: Full  Grasp C: Full  Grasp D: Full  Grasp E: Full  Hand Total: 14    Coordination/Speed  Tremor: None  Dysmetria: None  Time: <1s  Coordination/Speed Total : 6    Total A-D  Total A-D (Motor Function): 66/66     This is a reliable/valid measure of arm function after a neurological event. It has established value to characterize functional status and for measuring spontaneous and therapy-induced recovery; tests proximal and distal motor functions. Fugl-Mathews Assessment - UE scores recorded between five and 30 days post neurologic event can be used to predict UE recovery at six months post neurologic event. Severe = 0-21 points   Moderately Severe = 22-33 points   Moderate = 34-47 points   Mild = 48-66 points  MIGUEL Marquez, MANN White, & OPAL De Jesus (1992). Measurement of motor recovery after stroke: Outcome assessment and sample size requirements. Stroke, 23, pp. 5692-5539. ------------------------------------------------------------------------------------------------------------------------------------------------------------------  MCID:  Stroke:   Clare Chávez et al, 2001; n = 171; mean age 79 (6) years; assessed within 16 (12) days of stroke, Acute Stroke)  FMA Motor Scores from Admission to Discharge   10 point increase in FMA Upper Extremity = 1.5 change in discharge FIM   10 point increase in FMA Lower Extremity = 1.9 change in discharge FIM  MDC:   Stroke:   Marcelo Caro et al, 2008, n = 14, mean age = 59.9 (14.6) years, assessed on average 14 (6.5) months post stroke, Chronic Stroke)   FMA = 5.2 points for the Upper Extremity portion of the assessment     Occupational Therapy Evaluation Charge Determination   History Examination Decision-Making   LOW Complexity : Brief history review  LOW Complexity : 1-3 performance deficits relating to physical, cognitive , or psychosocial skils that result in activity limitations and / or participation restrictions  LOW Complexity : No comorbidities that affect functional and no verbal or physical assistance needed to complete eval tasks       Based on the above components, the patient evaluation is determined to be of the following complexity level: LOW   Pain Rating:  Patient did not report pain    Activity Tolerance:   VSS    After treatment patient left in no apparent distress:    Standing/ ambulating with physical therapist for next session    COMMUNICATION/EDUCATION:   The patients plan of care was discussed with: Physical Therapist, Registered Nurse and . Patient and/or family was verbally educated on the BE FAST acronym for signs/symptoms of CVA and TIA. Provided with BEFAST handout. All questions answered with patient indicating good understanding.      Thank you for this referral.  Jefe Diallo OT  Time Calculation: 18 mins

## 2019-11-01 NOTE — ED NOTES
Assumed care of patient at this time from [address] [place] [address] patient resting comfortably in stretcher, updated on plan of care.

## 2019-11-01 NOTE — ROUTINE PROCESS
Bedside shift change report given to HEMANTH DUFF  (oncoming nurse) by Ilia Elise RN  (offgoing nurse). Report included the following information SBAR, Kardex, ED Summary, Procedure Summary, Intake/Output, MAR, Recent Results, Med Rec Status, Cardiac Rhythm NSR. , Alarm Parameters , Procedure Verification and Quality Measures.

## 2019-11-01 NOTE — PROGRESS NOTES
Admission Medication Reconciliation:    Information obtained from:  Patient  RxQuery data available¹:  NO    Comments/Recommendations: Spoke with patient with wife present with patient's permission. Discussed use of prescription/OTC, vitamins/supplements, inhaled, topical, nasal, injectable, otic and ophthalmic medications. Updated PTA meds/reviewed patient's allergies. Medication changes (since last review): Added  - Aspirin    Adjusted  - None    Removed  - Acetaminophen-codeine  - Prednisone     ¹RxQuery pharmacy benefit data reflects medications filled and processed through the patient's insurance, however   this data does NOT capture whether the medication was picked up or is currently being taken by the patient. Allergies:  Patient has no known allergies. Significant PMH/Disease States: History reviewed. No pertinent past medical history. Chief Complaint for this Admission:    Chief Complaint   Patient presents with    Dysarthria     Prior to Admission Medications:   Prior to Admission Medications   Prescriptions Last Dose Informant Patient Reported? Taking?   aspirin delayed-release 81 mg tablet 10/31/2019 at AM  Yes Yes   Sig: Take 81 mg by mouth daily. Facility-Administered Medications: None       Please contact the main inpatient pharmacy with any questions or concerns at [phone] and we will direct you to the clinical pharmacist covering this patient's care while in-house.    CIRO Chery

## 2019-11-01 NOTE — PROGRESS NOTES
Hospitalist Progress Note  Yaquelin Francis MD  Answering service: [phone] from in house phone        Date of Service:  2019  NAME:  Cata Lei  :  1965  MRN:  [id number]      Admission Summary:   63 yo male with a remote history of subdural hemorrhage s/p craniotomy sabine holes presents with difficulty speaking, change in gait, and weakness in right arm and leg. Interval history / Subjective:    PT reports he feels back to his baseline. Currently getting EEG. Assessment & Plan:     Dysarthria and right sided weakness  -CT scan unremarkable  -MRI pending  -Echo pending  -EEG pending  -neurology consulted, started on keppra for suspected subclinical seizures.   -cont asa    Elevated BP  -permissive htn  -IV labetalol prn  -will need antihypertensive regimen on discharge    Tobacco use  -cessation counselling    Alcohol abuse  -5 beers daily  -ciwa protocol  -thiamine      Code status: full  DVT prophylaxis: SCD    Care Plan discussed with: Patient/Family  Disposition: Home w/Family and TBD     Hospital Problems  Date Reviewed: 9/3/2014          Codes Class Noted POA    Dysarthria ICD-10-CM: R47.1  ICD-9-CM: 784.51  10/31/2019 Unknown                Review of Systems:   A comprehensive review of systems was negative except for that written in the HPI. Vital Signs:    Last 24hrs VS reviewed since prior progress note. Most recent are:  Visit Vitals  /83   Pulse (!) 52   Temp 97.8 °F (36.6 °C)   Resp 21   Ht 5' 9\" (1.753 m)   Wt 91.2 kg (201 lb)   SpO2 96%   BMI 29.68 kg/m²       No intake or output data in the 24 hours ending 19 1230     Physical Examination:             Constitutional:  No acute distress, cooperative, pleasant    ENT:  Oral mucous moist, oropharynx benign. Resp:  CTA bilaterally. No wheezing/rhonchi/rales.  No accessory muscle use   CV:  Regular rhythm, normal rate, no murmurs, gallops, rubs    GI:  Soft, non distended, non tender. normoactive bowel sounds, no hepatosplenomegaly     Musculoskeletal:  No edema, warm, 2+ pulses throughout    Neurologic:  Moves all extremities. AAOx3, CN II-XII reviewed     Psych:  Good insight, Not anxious nor agitated. Data Review:    Review and/or order of clinical lab test      Labs:     Recent Labs     10/31/19  1834   WBC 6.1   HGB 14.3   HCT 42.2        Recent Labs     10/31/19  1834      K 3.6      CO2 29   BUN 10   CREA 0.98   GLU 83   CA 9.1     Recent Labs     10/31/19  1834   SGOT 31   ALT 51   AP 75   TBILI 0.5   TP 8.2   ALB 4.0   GLOB 4.2*     Recent Labs     10/31/19  1834   INR 1.1   PTP 11.3*      No results for input(s): FE, TIBC, PSAT, FERR in the last 72 hours. Lab Results   Component Value Date/Time    Folate 8.8 08/14/2012 04:45 AM      No results for input(s): PH, PCO2, PO2 in the last 72 hours. No results for input(s): CPK, CKNDX, TROIQ in the last 72 hours.     No lab exists for component: CPKMB  Lab Results   Component Value Date/Time    Cholesterol, total 167 10/31/2019 06:34 PM    HDL Cholesterol 66 10/31/2019 06:34 PM    LDL, calculated 72.4 10/31/2019 06:34 PM    Triglyceride 143 10/31/2019 06:34 PM    CHOL/HDL Ratio 2.5 10/31/2019 06:34 PM     Lab Results   Component Value Date/Time    Glucose (POC) 87 10/31/2019 06:27 PM    Glucose (POC) 187 (H) 08/05/2012 06:32 PM    Glucose (POC) 142 (H) 08/05/2012 12:10 PM     Lab Results   Component Value Date/Time    Color YELLOW 08/14/2012 06:00 AM    Appearance CLEAR 08/14/2012 06:00 AM    Specific gravity 1.009 08/14/2012 06:00 AM    pH (UA) 5.5 08/14/2012 06:00 AM    Protein NEGATIVE  08/14/2012 06:00 AM    Glucose NEGATIVE  08/14/2012 06:00 AM    Ketone NEGATIVE  08/14/2012 06:00 AM    Bilirubin NEGATIVE  08/14/2012 06:00 AM    Urobilinogen 0.2 08/14/2012 06:00 AM    Nitrites NEGATIVE  08/14/2012 06:00 AM    Leukocyte Esterase NEGATIVE  08/14/2012 06:00 AM Epithelial cells 0-5 08/14/2012 06:00 AM    Bacteria NEGATIVE  08/14/2012 06:00 AM    WBC 0-4 08/14/2012 06:00 AM    RBC 0-3 08/14/2012 06:00 AM         Medications Reviewed:     Current Facility-Administered Medications   Medication Dose Route Frequency    levETIRAcetam (KEPPRA) tablet 500 mg  500 mg Oral BID    acetaminophen (TYLENOL) tablet 650 mg  650 mg Oral Q4H PRN    Or    acetaminophen (TYLENOL) solution 650 mg  650 mg Per NG tube Q4H PRN    Or    acetaminophen (TYLENOL) suppository 650 mg  650 mg Rectal Q4H PRN    aspirin chewable tablet 81 mg  81 mg Oral DAILY    labetalol (NORMODYNE;TRANDATE) injection 5 mg  5 mg IntraVENous Q10MIN PRN    LORazepam (ATIVAN) tablet 2 mg  2 mg Oral Q1H PRN    LORazepam (ATIVAN) tablet 4 mg  4 mg Oral Q1H PRN    thiamine HCL (B-1) tablet 100 mg  100 mg Oral DAILY     ______________________________________________________________________  EXPECTED LENGTH OF STAY: - - -  ACTUAL LENGTH OF STAY:          Sheri Garcia MD

## 2019-11-01 NOTE — PROGRESS NOTES
PHYSICAL THERAPY EVALUATION/DISCHARGE  Patient: Zachary Cortez (58 y.o. male)  Date: 11/1/2019  Primary Diagnosis: Dysarthria [R47.1]       Precautions:          ASSESSMENT  Based on the objective data described below, the patient presents with difficulty with word finding and facial droop which has now resolved. Patient is ambulating with a steady gait in his room and out in the hallway. Slightly widened VINICIUS, but gait otherwise unremarkable. Patient appears to be at his functional baseline. BP stable throughout <676 systolic. Reviewed FAST signs and symptoms with good results. Functional Outcome Measure: The patient scored Total: 52/56 on the Hill Balance Assessment which is indicative of low fall risk. Further skilled acute physical therapy is not indicated at this time. PLAN :  Recommendation for discharge: (in order for the patient to meet his/her long term goals)  No skilled physical therapy/ follow up rehabilitation needs identified at this time. This discharge recommendation:  Has been made in collaboration with the attending provider and/or case management    IF patient discharges home will need the following DME: patient owns DME required for discharge         SUBJECTIVE:   Patient stated We are just walking around the unit? That's easy for me.     OBJECTIVE DATA SUMMARY:   HISTORY:    History reviewed. No pertinent past medical history. Past Surgical History:   Procedure Laterality Date    NEUROLOGICAL PROCEDURE UNLISTED         Prior level of function: Independent. No device. Working as a    Personal factors and/or comorbidities impacting plan of care: current smoker. Voiced wish to cease smoking and drinking.     Home Situation  Home Environment: Private residence  # Steps to Enter: 3  Rails to Enter: No  One/Two Story Residence: Two story  # of Interior Steps: 10  Living Alone: No  Support Systems: Spouse/Significant Other/Partner  Patient Expects to be Discharged to[de-identified] Private residence  Current DME Used/Available at Home: None    EXAMINATION/PRESENTATION/DECISION MAKING:   Critical Behavior:  Neurologic State: Alert  Orientation Level: Oriented X4  Cognition: Appropriate decision making, Appropriate for age attention/concentration, Appropriate safety awareness  Safety/Judgement: Awareness of environment  Hearing: Auditory  Auditory Impairment: None    Range Of Motion:  AROM: Within functional limits    Strength:    Strength: Within functional limits    Tone & Sensation:   Tone: Normal  Sensation: Intact       Coordination:  Coordination: Within functional limits  Vision:   Tracking: Able to track stimulus in all quadrants w/o difficulty  Visual Fields: (able to detect in all fields)  Diplopia: No  Acuity: Within Defined Limits; Impaired near vision(at baseline)  Corrective Lenses: Glasses(not present)  Functional Mobility:  Bed Mobility:  Supine to Sit: Independent     Transfers:  Sit to Stand: Independent  Stand to Sit: Independent     Balance:   Sitting: Intact  Standing: Intact     Ambulation/Gait Training:  Distance (ft): 250 Feet (ft)  Assistive Device: Gait belt  Ambulation - Level of Assistance: Supervision  Base of Support: Widened      Functional Measure  Hill Balance Test:    Sitting to Standing: 3  Standing Unsupported: 4  Sitting with Back Unsupported: 4  Standing to Sittin  Transfers: 4  Standing Unsupported with Eyes Closed: 4  Standing Unsupported with Feet Together: 4  Reach Forward with Outstretched Arm: 4   Object: 4  Turn to Look Over Shoulders: 4  Turn 360 Degrees: 4  Alternate Foot on Step/Stool: 3  Standing Unsupported One Foot in Front: 3  Stand on One Leg: 3  Total: 52/56         56=Maximum possible score;   0-20=High fall risk  21-40=Moderate fall risk   41-56=Low fall risk        Physical Therapy Evaluation Charge Determination   History Examination Presentation Decision-Making   LOW Complexity : Zero comorbidities / personal factors that will impact the outcome / POC LOW Complexity : 1-2 Standardized tests and measures addressing body structure, function, activity limitation and / or participation in recreation  LOW Complexity : Stable, uncomplicated  LOW Complexity : FOTO score of       Based on the above components, the patient evaluation is determined to be of the following complexity level: LOW     Pain Rating:  No pain reported    Activity Tolerance:   Good  Please refer to the flowsheet for vital signs taken during this treatment. After treatment patient left in no apparent distress:   Sitting in chair, Call bell within reach and Bed / chair alarm activated for seizure precautions    COMMUNICATION/EDUCATION:   The patients plan of care was discussed with: Occupational Therapist and Registered Nurse. Patient was educated regarding his deficit(s) of aphasia as this relates to his diagnosis of TIA/CVA workup. He demonstrated Good understanding. Patient and/or family was verbally educated on the BE FAST acronym for signs/symptoms of CVA and TIA. Informed patient to refer to the Stroke Binder for further BE FAST information. All questions answered with patient indicating good understanding. Fall prevention education was provided and the patient/caregiver indicated understanding., Patient/family have participated as able in goal setting and plan of care. and Patient/family agree to work toward stated goals and plan of care.     Thank you for this referral.  Pat Staff, PT, DPT  Geriatric Clinical Specialist    Time Calculation: 17 mins

## 2019-11-01 NOTE — PROGRESS NOTES
Orders received, chart reviewed and patient evaluated by physical therapy. Pending progression with skilled acute physical therapy, recommend:  No skilled physical therapy/ follow up rehabilitation needs identified at this time. Recommend with nursing patient to complete as able in order to maintain strength, endurance and independence: OOB to chair 3x/day with independence and ambulating with distant supervision. Thank you for your assistance. Full evaluation to follow.      Trisha Samayoa PT, DPT  Geriatric Clinical Specialist

## 2019-11-01 NOTE — PROGRESS NOTES
Bedside shift change report given to Wesley Fuentes RN (oncoming nurse) by Alpesh Macias RN (offgoing nurse). Report included the following information SBAR.

## 2019-11-01 NOTE — H&P
HISTORY AND PHYSICAL      PCP: None  History source: the patient, his wife, ER      CC: difficulty speaking      HPI: 62 y.o man with a remote hx of subdural hemorrhage s/p craniotomy sabine holes presents with difficulty speaking. Symptoms were noted this morning by his wife around 11:30 AM. She talked to him over the phone and noticed he had slurred speech. The patient is unsure of when his symptoms began exactly. His wife spoke to him over the phone last night around 6 PM and he sounded normal. He describes his difficulty speaking as \"the words won't come out right. \" His symptoms are currently improved but he still doesn't feel like he's at baseline. He denies associated fever, headache, vision changes, focal weakness, paresthesias, or difficulty walking. His wife states that about two days ago he started [employer] funny,\" like he's \"Dragging his right leg and arm. \" He smokes cigars and doesn't see a primary care physician. PMH/PSH:  History reviewed. No pertinent past medical history. Past Surgical History:   Procedure Laterality Date    NEUROLOGICAL PROCEDURE UNLISTED         Home meds:   Prior to Admission medications    Medication Sig Start Date End Date Taking? Authorizing Provider   aspirin delayed-release 81 mg tablet Take 81 mg by mouth daily. Yes Provider, Historical       Allergies:  No Known Allergies    FH:  History reviewed. No pertinent family history. SH:  He smokes cigars, variable amounts  He actually drinks 4-5 bottles of beer daily  He works as a   Denies illicit drug use    ROS: A comprehensive review of systems was negative except for that written in the HPI.       PHYSICAL EXAM:  Visit Vitals  /90 (BP 1 Location: Right arm, BP Patient Position: At rest)   Pulse 60   Temp 98.6 °F (37 °C)   Resp 18   SpO2 99%       Gen: NAD, non-toxic  HEENT: anicteric sclerae, normal conjunctiva, oropharynx clear, MM moist  Neck: supple, trachea midline, no adenopathy  Heart: RRR, no MRG, no JVD, no peripheral edema  Lungs: CTA b/l, non-labored respirations  Abd: soft, NT, ND, BS+, no organomegaly  Extr: warm  Skin: dry, no rash  Neuro: there is subtle right facial droop, there is 4/+5 strength on the right (arm and leg flexion/extension) compared to 5/5 on the left, his speech sounds normal but he is slow to respond; has to think longer than normal to formulate his words  Psych: normal mood, appropriate affect      Labs/Imaging:  Recent Results (from the past 24 hour(s))   GLUCOSE, POC    Collection Time: 10/31/19  6:27 PM   Result Value Ref Range    Glucose (POC) 87 65 - 100 mg/dL    Performed by Jose Raul Marie    CBC WITH AUTOMATED DIFF    Collection Time: 10/31/19  6:34 PM   Result Value Ref Range    WBC 6.1 4.1 - 11.1 K/uL    RBC 4.19 4. 10 - 5.70 M/uL    HGB 14.3 12.1 - 17.0 g/dL    HCT 42.2 36.6 - 50.3 %    .7 (H) 80.0 - 99.0 FL    MCH 34.1 (H) 26.0 - 34.0 PG    MCHC 33.9 30.0 - 36.5 g/dL    RDW 12.0 11.5 - 14.5 %    PLATELET 210 519 - 960 K/uL    MPV 10.0 8.9 - 12.9 FL    NRBC 0.0 0  WBC    ABSOLUTE NRBC 0.00 0.00 - 0.01 K/uL    NEUTROPHILS 45 32 - 75 %    LYMPHOCYTES 42 12 - 49 %    MONOCYTES 11 5 - 13 %    EOSINOPHILS 1 0 - 7 %    BASOPHILS 1 0 - 1 %    IMMATURE GRANULOCYTES 0 0.0 - 0.5 %    ABS. NEUTROPHILS 2.8 1.8 - 8.0 K/UL    ABS. LYMPHOCYTES 2.6 0.8 - 3.5 K/UL    ABS. MONOCYTES 0.7 0.0 - 1.0 K/UL    ABS. EOSINOPHILS 0.1 0.0 - 0.4 K/UL    ABS. BASOPHILS 0.1 0.0 - 0.1 K/UL    ABS. IMM.  GRANS. 0.0 0.00 - 0.04 K/UL    DF AUTOMATED     METABOLIC PANEL, COMPREHENSIVE    Collection Time: 10/31/19  6:34 PM   Result Value Ref Range    Sodium 140 136 - 145 mmol/L    Potassium 3.6 3.5 - 5.1 mmol/L    Chloride 105 97 - 108 mmol/L    CO2 29 21 - 32 mmol/L    Anion gap 6 5 - 15 mmol/L    Glucose 83 65 - 100 mg/dL    BUN 10 6 - 20 MG/DL    Creatinine 0.98 0.70 - 1.30 MG/DL    BUN/Creatinine ratio 10 (L) 12 - 20      GFR est AA >60 >60 ml/min/1.73m2    GFR est non-AA >60 >60 ml/min/1.73m2    Calcium 9.1 8.5 - 10.1 MG/DL    Bilirubin, total 0.5 0.2 - 1.0 MG/DL    ALT (SGPT) 51 12 - 78 U/L    AST (SGOT) 31 15 - 37 U/L    Alk. phosphatase 75 45 - 117 U/L    Protein, total 8.2 6.4 - 8.2 g/dL    Albumin 4.0 3.5 - 5.0 g/dL    Globulin 4.2 (H) 2.0 - 4.0 g/dL    A-G Ratio 1.0 (L) 1.1 - 2.2     PROTHROMBIN TIME + INR    Collection Time: 10/31/19  6:34 PM   Result Value Ref Range    INR 1.1 0.9 - 1.1      Prothrombin time 11.3 (H) 9.0 - 11.1 sec   EKG, 12 LEAD, INITIAL    Collection Time: 10/31/19  7:06 PM   Result Value Ref Range    Ventricular Rate 63 BPM    Atrial Rate 63 BPM    P-R Interval 162 ms    QRS Duration 88 ms    Q-T Interval 396 ms    QTC Calculation (Bezet) 405 ms    Calculated P Axis 28 degrees    Calculated R Axis 33 degrees    Calculated T Axis 20 degrees    Diagnosis       Normal sinus rhythm  When compared with ECG of 04-AUG-2012 22:34,  No significant change was found         Recent Labs     10/31/19  1834   WBC 6.1   HGB 14.3   HCT 42.2        Recent Labs     10/31/19  1834      K 3.6      CO2 29   BUN 10   CREA 0.98   GLU 83   CA 9.1     Recent Labs     10/31/19  1834   SGOT 31   ALT 51   AP 75   TBILI 0.5   TP 8.2   ALB 4.0   GLOB 4.2*       No results for input(s): CPK, CKNDX, TROIQ in the last 72 hours. No lab exists for component: CPKMB    Recent Labs     10/31/19  1834   INR 1.1   PTP 11.3*        No results for input(s): PH, PCO2, PO2 in the last 72 hours. Cta Code Neuro Head And Neck W Cont    Result Date: 10/31/2019  IMPRESSION: No major vessel occlusion, aneurysm, dissection, intraluminal thrombus, or significant stenosis. Ct Code Neuro Head Wo Contrast    Result Date: 10/31/2019  IMPRESSION: No acute finding. No change. Prior left craniotomy. Ct Code Neuro Perf W Cbf    Result Date: 10/31/2019  IMPRESSION: No acute perfusion abnormality demonstrated.           Assessment & Plan:     Dysarthria and right-sided weakness: concerning for CVA  -MRI brain wo contrast  -CT head anc CTA head/neck unremarkable  -check lipid panel, a1c, echocardiogram  -consult neurology, PT/OT/speech  -continue ASA for now    Elevated BP without a history of hypertension:   -permissive HTN  -PRN IV labetalol for severely elevated BP  -will probably need anti-hypertensives on discharge    Tobacco use:  - cessation    Alcohol use: drinks ~5 beers daily  - cessation  -CIWA monitoring while here  -provide thiamine    Needs a PCP on discharge    Code status: full  Disposition: home when ready    Signed By: Gustavo Abernathy MD     October 31, 2019

## 2019-11-01 NOTE — PROGRESS NOTES
SLP Contact Note    Consult completed. Recommend pt continue regular diet and thin liquids with esophageal precautions. Full evaluation note to follow.       Thank you,  KARIN CartagenaEd, [id number] [facility]  Speech-Language Pathologist

## 2019-11-01 NOTE — PROGRESS NOTES
Observation notice provided in writing to patient and/or caregiver as well as verbal explanation of the policy. Patients who are in outpatient status also receive the Observation notice. Reviewed with patient and copy given as well. Has been cleared by PT/OT no needs. Awaiting completion of medical workup.     Ruiz Hunt RN CRM  Ext [phone]

## 2019-11-02 VITALS
BODY MASS INDEX: 28.9 KG/M2 | HEART RATE: 65 BPM | WEIGHT: 195.11 LBS | TEMPERATURE: 98.4 F | DIASTOLIC BLOOD PRESSURE: 67 MMHG | OXYGEN SATURATION: 91 % | RESPIRATION RATE: 15 BRPM | SYSTOLIC BLOOD PRESSURE: 109 MMHG | HEIGHT: 69 IN

## 2019-11-02 LAB
ANION GAP SERPL CALC-SCNC: 4 MMOL/L (ref 5–15)
BASOPHILS # BLD: 0.1 K/UL (ref 0–0.1)
BASOPHILS NFR BLD: 1 % (ref 0–1)
BUN SERPL-MCNC: 15 MG/DL (ref 6–20)
BUN/CREAT SERPL: 16 (ref 12–20)
CALCIUM SERPL-MCNC: 8.7 MG/DL (ref 8.5–10.1)
CHLORIDE SERPL-SCNC: 107 MMOL/L (ref 97–108)
CO2 SERPL-SCNC: 26 MMOL/L (ref 21–32)
CREAT SERPL-MCNC: 0.91 MG/DL (ref 0.7–1.3)
DIFFERENTIAL METHOD BLD: ABNORMAL
EOSINOPHIL # BLD: 0.1 K/UL (ref 0–0.4)
EOSINOPHIL NFR BLD: 2 % (ref 0–7)
ERYTHROCYTE [DISTWIDTH] IN BLOOD BY AUTOMATED COUNT: 11.9 % (ref 11.5–14.5)
GLUCOSE SERPL-MCNC: 101 MG/DL (ref 65–100)
HCT VFR BLD AUTO: 41.5 % (ref 36.6–50.3)
HGB BLD-MCNC: 13.7 G/DL (ref 12.1–17)
IMM GRANULOCYTES # BLD AUTO: 0 K/UL (ref 0–0.04)
IMM GRANULOCYTES NFR BLD AUTO: 0 % (ref 0–0.5)
LYMPHOCYTES # BLD: 2.2 K/UL (ref 0.8–3.5)
LYMPHOCYTES NFR BLD: 37 % (ref 12–49)
MCH RBC QN AUTO: 33.6 PG (ref 26–34)
MCHC RBC AUTO-ENTMCNC: 33 G/DL (ref 30–36.5)
MCV RBC AUTO: 101.7 FL (ref 80–99)
MONOCYTES # BLD: 0.6 K/UL (ref 0–1)
MONOCYTES NFR BLD: 10 % (ref 5–13)
NEUTS SEG # BLD: 3 K/UL (ref 1.8–8)
NEUTS SEG NFR BLD: 50 % (ref 32–75)
NRBC # BLD: 0 K/UL (ref 0–0.01)
NRBC BLD-RTO: 0 PER 100 WBC
PLATELET # BLD AUTO: 151 K/UL (ref 150–400)
PMV BLD AUTO: 10.4 FL (ref 8.9–12.9)
POTASSIUM SERPL-SCNC: 3.9 MMOL/L (ref 3.5–5.1)
RBC # BLD AUTO: 4.08 M/UL (ref 4.1–5.7)
SODIUM SERPL-SCNC: 137 MMOL/L (ref 136–145)
WBC # BLD AUTO: 6.1 K/UL (ref 4.1–11.1)

## 2019-11-02 PROCEDURE — 99218 HC RM OBSERVATION: CPT

## 2019-11-02 PROCEDURE — 74011250637 HC RX REV CODE- 250/637: Performed by: HOSPITALIST

## 2019-11-02 PROCEDURE — 80048 BASIC METABOLIC PNL TOTAL CA: CPT

## 2019-11-02 PROCEDURE — 85025 COMPLETE CBC W/AUTO DIFF WBC: CPT

## 2019-11-02 PROCEDURE — 74011250637 HC RX REV CODE- 250/637: Performed by: PSYCHIATRY & NEUROLOGY

## 2019-11-02 PROCEDURE — 36415 COLL VENOUS BLD VENIPUNCTURE: CPT

## 2019-11-02 RX ORDER — LEVETIRACETAM 500 MG/1
500 TABLET ORAL 2 TIMES DAILY
Qty: 60 TAB | Refills: 0 | Status: SHIPPED | OUTPATIENT
Start: 2019-11-02 | End: 2022-05-22

## 2019-11-02 RX ADMIN — ASPIRIN 81 MG 81 MG: 81 TABLET ORAL at 09:13

## 2019-11-02 RX ADMIN — LEVETIRACETAM 500 MG: 500 TABLET ORAL at 09:13

## 2019-11-02 NOTE — PROGRESS NOTES
Hospitalist Progress Note  Jon Merrill MD  Answering service: [phone] OR [phone] from in house phone        Date of Service:  2019  NAME:  Jaelyn Pike  :  1965  MRN:  [id number]      Admission Summary:   63 yo male with a remote history of subdural hemorrhage s/p craniotomy sabine holes presents with difficulty speaking, change in gait, and weakness in right arm and leg. Interval history / Subjective:    PT reports he feels back to his baseline. Assessment & Plan:     Dysarthria and right sided weakness  -CT scan unremarkable  -MRI pending, questionable \"plate in head\" from childhood per pt. Will need to check with radiology to see if this is visible on CT scan  -Echo shows normal saline contrast study, normal systolic function and EF.   -EEG shows mild focal slowing in the left hemisphere, poss  Related to previous subdural hematoma  -neurology consulted, started on keppra for suspected subclinical seizures.   -cont asa    Elevated BP  -permissive htn  -IV labetalol prn  -will need antihypertensive regimen on discharge    Tobacco use  -cessation counselling    Alcohol abuse  -5 beers daily  -ciwa protocol  -thiamine      Code status: full  DVT prophylaxis: SCD    Care Plan discussed with: Patient/Family  Disposition: Home w/Family and TBD     Hospital Problems  Date Reviewed: 9/3/2014          Codes Class Noted POA    Dysarthria ICD-10-CM: R47.1  ICD-9-CM: 784.51  10/31/2019 Unknown                Review of Systems:   A comprehensive review of systems was negative except for that written in the HPI. Vital Signs:    Last 24hrs VS reviewed since prior progress note.  Most recent are:  Visit Vitals  /64 (BP 1 Location: Left arm, BP Patient Position: At rest)   Pulse (!) 55   Temp 98 °F (36.7 °C)   Resp 13   Ht 5' 9\" (1.753 m)   Wt 88.5 kg (195 lb 1.7 oz)   SpO2 99%   BMI 28.81 kg/m²       No intake or output data in the 24 hours ending 11/02/19 1051     Physical Examination:             Constitutional:  No acute distress, cooperative, pleasant    ENT:  Oral mucous moist, oropharynx benign. Resp:  CTA bilaterally. No wheezing/rhonchi/rales. No accessory muscle use   CV:  Regular rhythm, normal rate, no murmurs, gallops, rubs    GI:  Soft, non distended, non tender. normoactive bowel sounds, no hepatosplenomegaly     Musculoskeletal:  No edema, warm, 2+ pulses throughout    Neurologic:  Moves all extremities. AAOx3, CN II-XII reviewed     Psych:  Good insight, Not anxious nor agitated. Data Review:    Review and/or order of clinical lab test      Labs:     Recent Labs     11/02/19  0548 10/31/19  1834   WBC 6.1 6.1   HGB 13.7 14.3   HCT 41.5 42.2    163     Recent Labs     11/02/19  0548 10/31/19  1834    140   K 3.9 3.6    105   CO2 26 29   BUN 15 10   CREA 0.91 0.98   * 83   CA 8.7 9.1     Recent Labs     10/31/19  1834   SGOT 31   ALT 51   AP 75   TBILI 0.5   TP 8.2   ALB 4.0   GLOB 4.2*     Recent Labs     10/31/19  1834   INR 1.1   PTP 11.3*      No results for input(s): FE, TIBC, PSAT, FERR in the last 72 hours. Lab Results   Component Value Date/Time    Folate 8.8 08/14/2012 04:45 AM      No results for input(s): PH, PCO2, PO2 in the last 72 hours. No results for input(s): CPK, CKNDX, TROIQ in the last 72 hours.     No lab exists for component: CPKMB  Lab Results   Component Value Date/Time    Cholesterol, total 167 10/31/2019 06:34 PM    HDL Cholesterol 66 10/31/2019 06:34 PM    LDL, calculated 72.4 10/31/2019 06:34 PM    Triglyceride 143 10/31/2019 06:34 PM    CHOL/HDL Ratio 2.5 10/31/2019 06:34 PM     Lab Results   Component Value Date/Time    Glucose (POC) 87 10/31/2019 06:27 PM    Glucose (POC) 187 (H) 08/05/2012 06:32 PM    Glucose (POC) 142 (H) 08/05/2012 12:10 PM     Lab Results   Component Value Date/Time    Color YELLOW 08/14/2012 06:00 AM    Appearance CLEAR 08/14/2012 06:00 AM Specific gravity 1.009 08/14/2012 06:00 AM    pH (UA) 5.5 08/14/2012 06:00 AM    Protein NEGATIVE  08/14/2012 06:00 AM    Glucose NEGATIVE  08/14/2012 06:00 AM    Ketone NEGATIVE  08/14/2012 06:00 AM    Bilirubin NEGATIVE  08/14/2012 06:00 AM    Urobilinogen 0.2 08/14/2012 06:00 AM    Nitrites NEGATIVE  08/14/2012 06:00 AM    Leukocyte Esterase NEGATIVE  08/14/2012 06:00 AM    Epithelial cells 0-5 08/14/2012 06:00 AM    Bacteria NEGATIVE  08/14/2012 06:00 AM    WBC 0-4 08/14/2012 06:00 AM    RBC 0-3 08/14/2012 06:00 AM         Medications Reviewed:     Current Facility-Administered Medications   Medication Dose Route Frequency    levETIRAcetam (KEPPRA) tablet 500 mg  500 mg Oral BID    acetaminophen (TYLENOL) tablet 650 mg  650 mg Oral Q4H PRN    Or    acetaminophen (TYLENOL) solution 650 mg  650 mg Per NG tube Q4H PRN    Or    acetaminophen (TYLENOL) suppository 650 mg  650 mg Rectal Q4H PRN    aspirin chewable tablet 81 mg  81 mg Oral DAILY    labetalol (NORMODYNE;TRANDATE) injection 5 mg  5 mg IntraVENous Q10MIN PRN    LORazepam (ATIVAN) tablet 2 mg  2 mg Oral Q1H PRN    LORazepam (ATIVAN) tablet 4 mg  4 mg Oral Q1H PRN    thiamine HCL (B-1) tablet 100 mg  100 mg Oral DAILY     ______________________________________________________________________  EXPECTED LENGTH OF STAY: - - -  ACTUAL LENGTH OF STAY:          Luisa Silvestre MD

## 2019-11-02 NOTE — PROGRESS NOTES
Spiritual Care Partner Volunteer visited patient in [address] [place] [address] on 11/2/19. Documented by:   Chaplain Emanuel MDiv, MACE  [phone] ([id number])

## 2019-11-02 NOTE — DISCHARGE SUMMARY
Discharge Summary       PATIENT ID: Jose Garcia  MRN: [id number]   YOB: 1965    DATE OF ADMISSION: 10/31/2019  6:20 PM    DATE OF DISCHARGE: 11/2/2019   PRIMARY CARE PROVIDER: None     ATTENDING PHYSICIAN: Mary Shaikh MD  DISCHARGING PROVIDER: Mary Shaikh MD    To contact this individual call [phone] and ask the  to page. If unavailable ask to be transferred the Adult Hospitalist Department. CONSULTATIONS: IP CONSULT TO HOSPITALIST  IP CONSULT TO NEUROLOGY    PROCEDURES/SURGERIES: * No surgery found *    ADMITTING DIAGNOSES & HOSPITAL COURSE:   63 yo male with a remote history of subdural hemorrhage s/p craniotomy sabine holes presents with difficulty speaking, change in gait, and weakness in right arm and leg. CT scan was unremarkable. MRI was not completed due to a remote history of patient having a plate in his head from childhood. Echocardiogram was unremarkable, and EEG showed mild focal slowing in the left hemisphere, poss related to previous subdural hematoma. Neurology was consulted, and suspected subclinical seizures. He was started on keppra, and his symptoms did not recur during the hospital stay. He will need to follow up with neurology as an outpatient. DISCHARGE DIAGNOSES / PLAN:      1. Dysarthria and right sided weakness  2. Subclinical seizures  3. Elevated blood pressure  4. Alcohol abuse  5. Tobacco use     ADDITIONAL CARE RECOMMENDATIONS:   Continue taking keppra as prescribed. Follow up with neurology in 2 weeks.      PENDING TEST RESULTS:     None    FOLLOW UP APPOINTMENTS:    Follow-up Information     Follow up With Specialties Details Why Contact Maday Araujo, DO Neurology In 2 weeks Subclinical seizures [address] [address]  [phone]               DIET: Regular    ACTIVITY: as tolerated       DISCHARGE MEDICATIONS:  Current Discharge Medication List      START taking these medications    Details levETIRAcetam (KEPPRA) 500 mg tablet Take 1 Tab by mouth two (2) times a day. Qty: 60 Tab, Refills: 0         CONTINUE these medications which have NOT CHANGED    Details   aspirin delayed-release 81 mg tablet Take 81 mg by mouth daily. NOTIFY YOUR PHYSICIAN FOR ANY OF THE FOLLOWING:   Fever over 101 degrees for 24 hours. Chest pain, shortness of breath, fever, chills, nausea, vomiting, diarrhea, change in mentation, falling, weakness, bleeding. Severe pain or pain not relieved by medications. Or, any other signs or symptoms that you may have questions about. DISPOSITION:   x Home With:   OT  PT  HH  RN       Long term SNF/Inpatient Rehab    Independent/assisted living    Hospice    Other:       PATIENT CONDITION AT DISCHARGE:     Functional status    Poor     Deconditioned    x Independent      Cognition   x  Lucid     Forgetful     Dementia      Catheters/lines (plus indication)    [place]     PICC     PEG    x None      Code status   x  Full code     DNR      PHYSICAL EXAMINATION AT DISCHARGE:  General:          Alert, cooperative, no distress, appears stated age. HEENT:           Atraumatic, anicteric sclerae, pink conjunctivae                          No oral ulcers, mucosa moist, throat clear, dentition fair  Neck:               Supple, symmetrical  Lungs:             Clear to auscultation bilaterally. No Wheezing or Rhonchi. No rales. Chest wall:      No tenderness  No Accessory muscle use. Heart:              Regular  rhythm,  No  murmur   No edema  Abdomen:        Soft, non-tender. Not distended. Bowel sounds normal  Extremities:     No cyanosis. No clubbing,                            Skin turgor normal, Capillary refill normal  Skin:                Not pale. Not Jaundiced  No rashes   Psych:             Not anxious or agitated.   Neurologic:      Alert, moves all extremities, answers questions appropriately and responds to commands       CHRONIC MEDICAL DIAGNOSES:  Problem List as of 11/2/2019 Date Reviewed: 9/3/2014          Codes Class Noted - Resolved    Dysarthria ICD-10-CM: R47.1  ICD-9-CM: 784.51  10/31/2019 - Present        CVA (cerebral infarction) ICD-10-CM: I63.9  ICD-9-CM: 434.91  8/4/2012 - Present        Tobacco abuse ICD-10-CM: Z72.0  ICD-9-CM: 305.1  8/4/2012 - Present        Alcohol abuse ICD-10-CM: F10.10  ICD-9-CM: 305.00  8/4/2012 - Present        Subdural hemorrhage ([facility] [address] ICD-10-CM: I62.00  ICD-9-CM: 432.1  8/4/2012 - Present              Greater than 15 minutes were spent with the patient on counseling and coordination of care    Signed:   Gillian Bradley MD  11/2/2019  1:39 PM

## 2019-11-02 NOTE — PROGRESS NOTES
Problem: Falls - Risk of  Goal: *Absence of Falls  Description  Document Devere Andes Fall Risk and appropriate interventions in the flowsheet. Outcome: Progressing Towards Goal  Note:   Fall Risk Interventions:            Medication Interventions: Bed/chair exit alarm, Patient to call before getting OOB                   Problem: TIA/CVA Stroke: Day 2 Until Discharge  Goal: Activity/Safety  Outcome: Progressing Towards Goal  Goal: Diagnostic Test/Procedures  Outcome: Progressing Towards Goal  Goal: Nutrition/Diet  Outcome: Progressing Towards Goal  Goal: Medications  Outcome: Progressing Towards Goal  Goal: *Absence of aspiration  Outcome: Progressing Towards Goal  Goal: *Tolerating diet  Outcome: Progressing Towards Goal     Problem: Pressure Injury - Risk of  Goal: *Prevention of pressure injury  Description  Document Christiano Scale and appropriate interventions in the flowsheet.   Outcome: Progressing Towards Goal  Note:   Pressure Injury Interventions:  Sensory Interventions: Assess changes in LOC, Avoid rigorous massage over bony prominences, Discuss PT/OT consult with provider, Float heels, Keep linens dry and wrinkle-free, Maintain/enhance activity level, Minimize linen layers                                         Problem: Pain  Goal: *Control of Pain  Outcome: Progressing Towards Goal

## 2019-11-02 NOTE — DISCHARGE INSTRUCTIONS
Discharge Instructions       PATIENT ID: Alonzo Campbell  MRN: [id number]   YOB: 1965    DATE OF ADMISSION: 10/31/2019  6:20 PM    DATE OF DISCHARGE: 11/2/2019    PRIMARY CARE PROVIDER: None     ATTENDING PHYSICIAN: Alyson Sharp MD  DISCHARGING PROVIDER: Shantelle Ma MD    To contact this individual call [phone] and ask the  to page. If unavailable ask to be transferred the Adult Hospitalist Department. DISCHARGE DIAGNOSES   1. Dysarthria and right sided weakness  2. Subclinical seizures  3. Elevated blood pressure  4. Alcohol abuse  5. Tobacco use    CONSULTATIONS: IP CONSULT TO HOSPITALIST  IP CONSULT TO NEUROLOGY    PROCEDURES/SURGERIES: * No surgery found *    PENDING TEST RESULTS:   None    FOLLOW UP APPOINTMENTS:   Follow-up Information     Follow up With Specialties Details Why Contact Maday Greene DO Neurology In 2 weeks Subclinical seizures [address] [address]  [phone]             ADDITIONAL CARE RECOMMENDATIONS:   Continue taking keppra as prescribed. Follow up with neurology in 2 weeks. DIET: Regular    ACTIVITY: as tolerated       DISCHARGE MEDICATIONS:   See Medication Reconciliation Form    · It is important that you take the medication exactly as they are prescribed. · Keep your medication in the bottles provided by the pharmacist and keep a list of the medication names, dosages, and times to be taken in your wallet. · Do not take other medications without consulting your doctor. NOTIFY YOUR PHYSICIAN FOR ANY OF THE FOLLOWING:   Fever over 101 degrees for 24 hours. Chest pain, shortness of breath, fever, chills, nausea, vomiting, diarrhea, change in mentation, falling, weakness, bleeding. Severe pain or pain not relieved by medications. Or, any other signs or symptoms that you may have questions about.       DISPOSITION:  x  Home With:   OT  PT  [place]  RN       SNF/Inpatient Rehab/LTAC Independent/assisted living    Hospice    Other:     CDMP Checked:   Yes ***     PROBLEM LIST Updated:  Yes ***       Signed:   Princess Vega MD  11/2/2019  1:47 PM

## 2019-11-02 NOTE — PROGRESS NOTES
[address] November 2, 2019       RE: Dane Teague      To Whom It May Concern,    This is to certify that Dane Teague was admitted to [facility] from 10/31/2019 to 11/2/2019. Please feel free to contact my office if you have any questions or concerns. Thank you for your assistance in this matter.       Sincerely,  Nadine Frazier MD

## 2019-11-02 NOTE — PROGRESS NOTES
Patient requested a copy of the [address] [place] [address]  physician list as a means of facilitating selection of a new PCP. CM provided the list as requested.

## 2019-11-02 NOTE — PROCEDURES
[address]   EEG    Name:  Reuben Lou  MR#:  [id number]  :  1965  ACCOUNT #:  [de-identified]  DATE OF SERVICE:  2019      REQUESTING PHYSICIAN:  Dr. Alberto Guillen. HISTORY:  The patient is a 15-year-old male, who is being evaluated for slurred speech. He has traumatic brain injury with subdural hematoma. DESCRIPTION:  This is an 18-channel EEG performed on an awake patient. The dominant posterior background rhythm consists of symmetric, well-modulated medium voltage rhythms in the 8 Hz frequency range seen better formed out of the right posterior head region. There is intermittent slowing ranging from 5-7 Hz frequency range seen out of the left frontal and temporal head regions. Drowsiness and sleep states were not recorded. Photic stimulation elicits a symmetric driving response. Hyperventilation was not performed. EEG SUMMARY:  Abnormal EEG due to mild slowing seen in the left frontal temporal head region. CLINICAL INTERPRETATION:  This EEG shows mild focal slowing out of the left hemisphere, which could be related to underlying history of subdural hematoma or a structural lesion. No clearly epileptiform activity was seen. No seizure was recorded. Please correlate with the imaging studies.       Rafa Minor MD      [email or web address]  D:  2019 15:08  T:  2019 23:44  JOB #:  [id number]

## 2019-11-21 ENCOUNTER — OFFICE VISIT (OUTPATIENT)
Dept: NEUROLOGY | Age: 54
End: 2019-11-21

## 2019-11-21 VITALS
SYSTOLIC BLOOD PRESSURE: 150 MMHG | WEIGHT: 191 LBS | BODY MASS INDEX: 28.29 KG/M2 | HEIGHT: 69 IN | HEART RATE: 77 BPM | OXYGEN SATURATION: 98 % | RESPIRATION RATE: 16 BRPM | DIASTOLIC BLOOD PRESSURE: 98 MMHG

## 2019-11-21 DIAGNOSIS — I62.00 SUBDURAL HEMORRHAGE (HCC): ICD-10-CM

## 2019-11-21 DIAGNOSIS — R41.3 MEMORY LOSS: ICD-10-CM

## 2019-11-21 DIAGNOSIS — R56.9 SEIZURES (HCC): Primary | ICD-10-CM

## 2019-11-21 NOTE — PROGRESS NOTES
Pt is here for follow up from the hospital. Pt would like to come off of the [address]. He thinks he needs something for his memory.

## 2019-11-21 NOTE — PROGRESS NOTES
Daniel Vo is a 62 y.o. male presenting with Numbness        No flowsheet data found. 61 yo male with a history of subdural hemorrhage s/p craniotomy sabine holes presents  He was admitted to [facility] because of difficulty speaking, change in gait, and weakness in right arm and leg. CT scan was unremarkable. MRI was not completed due to a remote history of having a plate in his head from childhood. Echocardiogram was unremarkable, and EEG showed a mild focal slowing in the left hemisphere, poss related to a previous subdural hematoma. He saw  [facility] for subclinical seizures as placed on Keppra. He presents today wanting to get off of his Keppra and complains of memory issues. Denies seizure-like activity. He notes he having a hard time remembering simple things. He notes he is becoming forgetful. Denies getting lost while driving or near misses. He reports that he just feels a foggy thought.         Review of Systems   Neurological: Positive for seizures. Negative for dizziness and headaches. Psychiatric/Behavioral: Positive for memory loss. Past Medical History:   Diagnosis Date    Epilepsy [facility])        Past Surgical History:   Procedure Laterality Date    NEUROLOGICAL PROCEDURE UNLISTED         No Known Allergies    Current Outpatient Medications   Medication Sig Dispense Refill    levETIRAcetam (KEPPRA) 500 mg tablet Take 1 Tab by mouth two (2) times a day. 60 Tab 0    aspirin delayed-release 81 mg tablet Take 81 mg by mouth daily.          Social History     Socioeconomic History    Marital status:      Spouse name: Not on file    Number of children: Not on file    Years of education: Not on file    Highest education level: Not on file   Occupational History    Not on file   Social Needs    Financial resource strain: Not on file    Food insecurity:     Worry: Not on file     Inability: Not on file    Transportation needs:     Medical: Not on file Non-medical: Not on file   Tobacco Use    Smoking status: Never Smoker    Smokeless tobacco: Never Used   Substance and Sexual Activity    Alcohol use: No    Drug use: No    Sexual activity: Yes     Partners: Female   Lifestyle    Physical activity:     Days per week: Not on file     Minutes per session: Not on file    Stress: Not on file   Relationships    Social connections:     Talks on phone: Not on file     Gets together: Not on file     Attends Amish service: Not on file     Active member of club or organization: Not on file     Attends meetings of clubs or organizations: Not on file     Relationship status: Not on file    Intimate partner violence:     Fear of current or ex partner: Not on file     Emotionally abused: Not on file     Physically abused: Not on file     Forced sexual activity: Not on file   Other Topics Concern    Not on file   Social History Narrative    Not on file       History reviewed. No pertinent family history. Vitals:    11/21/19 1400   Weight: 86.6 kg (191 lb)   Height: 5' 9\" (1.753 m)        Neurologic Exam     Mental Status   Oriented to person, place, and time. Oriented to country, city and area. Oriented to time. Oriented to year, month and date. Attention: normal. Concentration: normal.   Speech: speech is normal   Level of consciousness: alert    Cranial Nerves     CN II   Visual fields full to confrontation. CN III, IV, VI   Pupils are equal, round, and reactive to light. Extraocular motions are normal.     CN V   Facial sensation intact. Motor Exam   Muscle bulk: normal  Overall muscle tone: normal  Right arm tone: normal  Left arm tone: normal  Right leg tone: normal  Left leg tone: normal    Strength   Strength 5/5 throughout.      Sensory Exam   Right arm light touch: normal    Gait, Coordination, and Reflexes     Gait  Gait: normal    Coordination   Finger to nose coordination: normal    Tremor   Resting tremor: absent  Intention tremor: absent      Physical Exam  HENT:      Head: Normocephalic. Eyes:      Extraocular Movements: EOM normal.      Pupils: Pupils are equal, round, and reactive to light. Neck:      Musculoskeletal: Normal range of motion. Cardiovascular:      Rate and Rhythm: Normal rate and regular rhythm. Skin:     General: Skin is warm and dry. Neurological:      Mental Status: He is alert and oriented to person, place, and time. Coordination: Finger-Nose-Finger Test normal.      Gait: Gait is intact. Deep Tendon Reflexes: Strength normal.   Psychiatric:         Speech: Speech normal.         Assessment and Plan:    62year old with hx of subdural hemorrhage now having some cognitive issue. It is possible that he could be developing a vascular dementia given his recent head injury. Will  poceed with a formal neuropsychology evaluation . Differential included , stress, anxiety, MCI, dementia. Will discontinue Keppra if EEG in normal. Continue to modify risk factors and we discussed these Discussed warning sx of CVA and what to do if these occur-911. Continue ASA 81 mg.     1. Subdural hemorrhage (HCC    2. Memory loss  - EEG; Future  - REFERRAL TO NEUROPSYCHOLOGY    3. Seizures ([address]  - EEG; Future  - REFERRAL TO NEUROPSYCHOLOGY    Follow up after testing   This note will not be viewable in MyChart.   Laquita Slater NP

## 2020-03-10 ENCOUNTER — TELEPHONE (OUTPATIENT)
Dept: NEUROLOGY | Age: 55
End: 2020-03-10

## 2020-03-10 NOTE — TELEPHONE ENCOUNTER
* Message from Aislinn Smith below:      ----- Message from Radha Conde sent at 3/9/2020 10:21 AM EDT -----  Regarding: [place]/Telephone  General Message/Vendor Calls    Caller's first and last name:Bill Addy      Reason for call:schedule procedure       Callback required yes/no and why:yes      Best contact number(s): [phone]      Details to clarify the request: Pt called requesting a call back to schedule procedure .       Radha Conde

## 2020-03-10 NOTE — TELEPHONE ENCOUNTER
I gave patient contact information for Coordination of Care to get his EEG scheduled from last visit with Angelito Pearson NP.

## 2022-03-18 PROBLEM — R47.1 DYSARTHRIA: Status: ACTIVE | Noted: 2019-10-31

## 2022-05-22 ENCOUNTER — OFFICE VISIT (OUTPATIENT)
Dept: URGENT CARE | Age: 57
End: 2022-05-22
Payer: COMMERCIAL

## 2022-05-22 VITALS
BODY MASS INDEX: 27.55 KG/M2 | HEART RATE: 69 BPM | DIASTOLIC BLOOD PRESSURE: 82 MMHG | RESPIRATION RATE: 16 BRPM | HEIGHT: 69 IN | WEIGHT: 186 LBS | OXYGEN SATURATION: 100 % | SYSTOLIC BLOOD PRESSURE: 161 MMHG

## 2022-05-22 DIAGNOSIS — M67.911 TENDINOPATHY OF ROTATOR CUFF, RIGHT: Primary | ICD-10-CM

## 2022-05-22 PROCEDURE — S9083 URGENT CARE CENTER GLOBAL: HCPCS | Performed by: FAMILY MEDICINE

## 2022-05-22 NOTE — PROGRESS NOTES
Arm Pain  This is a new problem. The current episode started more than 1 week ago (> 1 month before). The problem occurs daily. The problem has not changed since onset. Associated symptoms comments: Pain on Rt mid upper arm - associated with Rt shoulder pain   Pain with certain movement of shoulder most art night- not able to lye on rt side   No injury/ denies over use  No past h/o pain  No weakness/ tingling and numbness of arm . Exacerbated by: as above. The symptoms are relieved by rest. He has tried nothing for the symptoms. Past Medical History:   Diagnosis Date    Epilepsy [facility])         Past Surgical History:   Procedure Laterality Date    NEUROLOGICAL PROCEDURE UNLISTED           History reviewed. No pertinent family history. Social History     Socioeconomic History    Marital status:      Spouse name: Not on file    Number of children: Not on file    Years of education: Not on file    Highest education level: Not on file   Occupational History    Not on file   Tobacco Use    Smoking status: Never Smoker    Smokeless tobacco: Never Used   Substance and Sexual Activity    Alcohol use: No    Drug use: No    Sexual activity: Yes     Partners: Female   Other Topics Concern    Not on file   Social History Narrative    Not on file     Social Determinants of Health     Financial Resource Strain:     Difficulty of Paying Living Expenses: Not on file   Food Insecurity:     Worried About Running Out of Food in the Last Year: Not on file    Tony of Food in the Last Year: Not on file   Transportation Needs:     Lack of Transportation (Medical): Not on file    Lack of Transportation (Non-Medical):  Not on file   Physical Activity:     Days of Exercise per Week: Not on file    Minutes of Exercise per Session: Not on file   Stress:     Feeling of Stress : Not on file   Social Connections:     Frequency of Communication with Friends and Family: Not on file    Frequency of Social Gatherings with Friends and Family: Not on file    Attends Buddhist Services: Not on file    Active Member of Clubs or Organizations: Not on file    Attends Club or Organization Meetings: Not on file    Marital Status: Not on file   Intimate Partner Violence:     Fear of Current or Ex-Partner: Not on file    Emotionally Abused: Not on file    Physically Abused: Not on file    Sexually Abused: Not on file   Housing Stability:     Unable to Pay for Housing in the Last Year: Not on file    Number of [place] in the Last Year: Not on file    Unstable Housing in the Last Year: Not on file                ALLERGIES: Patient has no known allergies. Review of Systems   Musculoskeletal: Positive for arthralgias (rt shoulder ). Negative for joint swelling. All other systems reviewed and are negative. Vitals:    05/22/22 1514 05/22/22 1518   BP: (!) 173/100 (!) 161/82   Pulse: 69    Resp: 16    SpO2: 100%    Weight: 186 lb (84.4 kg)    Height: 5' 9\" (1.753 m)        Physical Exam  Vitals and nursing note reviewed. Musculoskeletal:      Right shoulder: Tenderness present. No swelling, deformity, bony tenderness or crepitus. Decreased range of motion. Decreased strength. Right upper arm: No swelling, edema, tenderness or bony tenderness. Arms:          MDM    Procedures      ICD-10-CM ICD-9-CM    1. Tendinopathy of rotator cuff, right  M67.911 727.9      'start self exercise  Use otc NSAID as needed  Follow with ortho in 2 weeks     No orders of the defined types were placed in this encounter. No results found for any visits on 05/22/22. The patients condition was discussed with the patient and they understand. The patient is to follow up with primary care doctor. If signs and symptoms become worse the pt is to go to the ER. The patient is to take medications as prescribed.

## 2022-05-22 NOTE — PATIENT INSTRUCTIONS
Rotator Cuff: Exercises  Introduction  Here are some examples of exercises for you to try. The exercises may be suggested for a condition or for rehabilitation. Start each exercise slowly. Ease off the exercises if you start to have pain. You will be told when to start these exercises and which ones will work best for you. How to do the exercises  Pendulum swing    If you have pain in your back, do not do this exercise. 1. Hold on to a table or the back of a chair with your good arm. Then bend forward a little and let your sore arm hang straight down. This exercise does not use the arm muscles. Rather, use your legs and your hips to create movement that makes your arm swing freely. 2. Use the movement from your hips and legs to guide the slightly swinging arm back and forth like a pendulum (or elephant trunk). Then guide it in circles that start small (about the size of a dinner plate). Make the circles a bit larger each day, as your pain allows. 3. Do this exercise for 5 minutes, 5 to 7 times each day. 4. As you have less pain, try bending over a little farther to do this exercise. This will increase the amount of movement at your shoulder. Posterior stretching exercise    1. Hold the elbow of your injured arm with your other hand. 2. Use your hand to pull your injured arm gently up and across your body. You will feel a gentle stretch across the back of your injured shoulder. 3. Hold for at least 15 to 30 seconds. Then slowly lower your arm. 4. Repeat 2 to 4 times. Up-the-back stretch    Your doctor or physical therapist may want you to wait to do this stretch until you have regained most of your range of motion and strength. You can do this stretch in different ways. Hold any of these stretches for at least 15 to 30 seconds. Repeat them 2 to 4 times. 1. Light stretch: Put your hand in your back pocket. Let it rest there to stretch your shoulder. 2. Moderate stretch:  With your other hand, hold your injured arm (palm outward) behind your back by the wrist. Pull your arm up gently to stretch your shoulder. 3. Advanced stretch: Put a towel over your other shoulder. Put the hand of your injured arm behind your back. Now hold the back end of the towel. With the other hand, hold the front end of the towel in front of your body. Pull gently on the front end of the towel. This will bring your hand farther up your back to stretch your shoulder. Overhead stretch    1. Standing about an arm's length away, grasp onto a solid surface. You could use a countertop, a doorknob, or the back of a sturdy chair. 2. With your knees slightly bent, bend forward with your arms straight. Lower your upper body, and let your shoulders stretch. 3. As your shoulders are able to stretch farther, you may need to take a step or two backward. 4. Hold for at least 15 to 30 seconds. Then stand up and relax. If you had stepped back during your stretch, step forward so you can keep your hands on the solid surface. 5. Repeat 2 to 4 times. Shoulder flexion (lying down)    To make a wand for this exercise, use a piece of PVC pipe or a broom handle with the broom removed. Make the wand about a foot wider than your shoulders. 1. Lie on your back, holding a wand with both hands. Your palms should face down as you hold the wand. 2. Keeping your elbows straight, slowly raise your arms over your head. Raise them until you feel a stretch in your shoulders, upper back, and chest.  3. Hold for 15 to 30 seconds. 4. Repeat 2 to 4 times. Shoulder rotation (lying down)    To make a wand for this exercise, use a piece of PVC pipe or a broom handle with the broom removed. Make the wand about a foot wider than your shoulders. 1. Lie on your back. Hold a wand with both hands with your elbows bent and palms up. 2. Keep your elbows close to your body, and move the wand across your body toward the sore arm. 3. Hold for 8 to 12 seconds.   4. Repeat 2 to 4 times. Wall climbing (to the side)    Avoid any movement that is straight to your side, and be careful not to arch your back. Your arm should stay about 30 degrees to the front of your side. 1. Stand with your side to a wall so that your fingers can just touch it at an angle about 30 degrees toward the front of your body. 2. Walk the fingers of your injured arm up the wall as high as pain permits. Try not to shrug your shoulder up toward your ear as you move your arm up. 3. Hold that position for a count of at least 15 to 20.  4. Walk your fingers back down to the starting position. 5. Repeat at least 2 to 4 times. Try to reach higher each time. Wall climbing (to the front)    During this stretching exercise, be careful not to arch your back. 1. Face a wall, and stand so your fingers can just touch it. 2. Keeping your shoulder down, walk the fingers of your injured arm up the wall as high as pain permits. (Don't shrug your shoulder up toward your ear.)  3. Hold your arm in that position for at least 15 to 30 seconds. 4. Slowly walk your fingers back down to where you started. 5. Repeat at least 2 to 4 times. Try to reach higher each time. Shoulder blade squeeze    1. Stand with your arms at your sides, and squeeze your shoulder blades together. Do not raise your shoulders up as you squeeze. 2. Hold 6 seconds. 3. Repeat 8 to 12 times. Scapular exercise: Arm reach    1. Lie flat on your back. This exercise is a very slight motion that starts with your arms raised (elbows straight, arms straight). 2. From this position, reach higher toward the halmia or ceiling. Keep your elbows straight. All motion should be from your shoulder blade only. 3. Relax your arms back to where you started. 4. Repeat 8 to 12 times. Arm raise to the side    During this strengthening exercise, your arm should stay about 30 degrees to the front of your side.   1. Slowly raise your injured arm to the side, with your thumb facing up. Raise your arm 60 degrees at the most (shoulder level is 90 degrees). 2. Hold the position for 3 to 5 seconds. Then lower your arm back to your side. If you need to, bring your \"good\" arm across your body and place it under the elbow as you lower your injured arm. Use your good arm to keep your injured arm from dropping down too fast.  3. Repeat 8 to 12 times. 4. When you first start out, don't hold any extra weight in your hand. As you get stronger, you may use a 1-pound to 2-pound dumbbell or a small can of food. Shoulder flexor and extensor exercise    These are isometric exercises. That means you contract your muscles without actually moving. 1. Push forward (flex): Stand facing a wall or doorjamb, about 6 inches or less back. Hold your injured arm against your body. Make a closed fist with your thumb on top. Then gently push your hand forward into the wall with about 25% to 50% of your strength. Don't let your body move backward as you push. Hold for about 6 seconds. Relax for a few seconds. Repeat 8 to 12 times. 2. Push backward (extend): Stand with your back flat against a wall. Your upper arm should be against the wall, with your elbow bent 90 degrees (your hand straight ahead). Push your elbow gently back against the wall with about 25% to 50% of your strength. Don't let your body move forward as you push. Hold for about 6 seconds. Relax for a few seconds. Repeat 8 to 12 times. Scapular exercise: Wall push-ups    This exercise is best done with your fingers somewhat turned out, rather than straight up and down. 1. Stand facing a wall, about 12 inches to 18 inches away. 2. Place your hands on the wall at shoulder height. 3. Slowly bend your elbows and bring your face to the wall. Keep your back and hips straight. 4. Push back to where you started. 5. Repeat 8 to 12 times. 6. When you can do this exercise against a wall comfortably, you can try it against a counter.  You can then slowly progress to the end of a couch, then to a sturdy chair, and finally to the floor. Scapular exercise: Retraction    For this exercise, you will need elastic exercise material, such as surgical tubing or Thera-Band. 1. Put the band around a solid object at about waist level. (A bedpost will work well.) Each hand should hold an end of the band. 2. With your elbows at your sides and bent to 90 degrees, pull the band back. Your shoulder blades should move toward each other. Then move your arms back where you started. 3. Repeat 8 to 12 times. 4. If you have good range of motion in your shoulders, try this exercise with your arms lifted out to the sides. Keep your elbows at a 90-degree angle. Raise the elastic band up to about shoulder level. Pull the band back to move your shoulder blades toward each other. Then move your arms back where you started. Internal rotator strengthening exercise    1. Start by tying a piece of elastic exercise material to a doorknob. You can use surgical tubing or Thera-Band. 2. Stand or sit with your shoulder relaxed and your elbow bent 90 degrees. Your upper arm should rest comfortably against your side. Squeeze a rolled towel between your elbow and your body for comfort. This will help keep your arm at your side. 3. Hold one end of the elastic band in the hand of the painful arm. 4. Slowly rotate your forearm toward your body until it touches your belly. Slowly move it back to where you started. 5. Keep your elbow and upper arm firmly tucked against the towel roll or at your side. 6. Repeat 8 to 12 times. External rotator strengthening exercise    1. Start by tying a piece of elastic exercise material to a doorknob. You can use surgical tubing or Thera-Band. (You may also hold one end of the band in each hand.)  2. Stand or sit with your shoulder relaxed and your elbow bent 90 degrees. Your upper arm should rest comfortably against your side.  Squeeze a rolled towel between your elbow and your body for comfort. This will help keep your arm at your side. 3. Hold one end of the elastic band with the hand of the painful arm. 4. Start with your forearm across your belly. Slowly rotate the forearm out away from your body. Keep your elbow and upper arm tucked against the towel roll or the side of your body until you begin to feel tightness in your shoulder. Slowly move your arm back to where you started. 5. Repeat 8 to 12 times. Follow-up care is a key part of your treatment and safety. Be sure to make and go to all appointments, and call your doctor if you are having problems. It's also a good idea to know your test results and keep a list of the medicines you take. Where can you learn more? Go to http://www.gray.com/  Enter J005 in the search box to learn more about \"Rotator Cuff: Exercises. \"  Current as of: July 1, 2021               Content Version: 13.2  © 2006-2022 [employer]. Care instructions adapted under license by [employer] (which disclaims liability or warranty for this information). If you have questions about a medical condition or this instruction, always ask your healthcare professional. [place] [address] any warranty or liability for your use of this information.

## 2022-07-22 ENCOUNTER — APPOINTMENT (OUTPATIENT)
Dept: CT IMAGING | Age: 57
DRG: 065 | End: 2022-07-22
Attending: EMERGENCY MEDICINE
Payer: COMMERCIAL

## 2022-07-22 ENCOUNTER — APPOINTMENT (OUTPATIENT)
Dept: GENERAL RADIOLOGY | Age: 57
DRG: 065 | End: 2022-07-22
Attending: EMERGENCY MEDICINE
Payer: COMMERCIAL

## 2022-07-22 ENCOUNTER — HOSPITAL ENCOUNTER (INPATIENT)
Age: 57
LOS: 6 days | Discharge: NURSING, REHAB OR LONG-TERM CARE | DRG: 065 | End: 2022-07-28
Attending: EMERGENCY MEDICINE | Admitting: STUDENT IN AN ORGANIZED HEALTH CARE EDUCATION/TRAINING PROGRAM
Payer: COMMERCIAL

## 2022-07-22 DIAGNOSIS — R47.01 EXPRESSIVE APHASIA: ICD-10-CM

## 2022-07-22 DIAGNOSIS — R53.1 ACUTE RIGHT-SIDED WEAKNESS: Primary | ICD-10-CM

## 2022-07-22 DIAGNOSIS — I63.9 CEREBROVASCULAR ACCIDENT (CVA) DUE TO OCCLUSION (HCC): ICD-10-CM

## 2022-07-22 LAB
ALBUMIN SERPL-MCNC: 4 G/DL (ref 3.5–5)
ALBUMIN/GLOB SERPL: 1 {RATIO} (ref 1.1–2.2)
ALP SERPL-CCNC: 68 U/L (ref 45–117)
ALT SERPL-CCNC: 33 U/L (ref 12–78)
AMMONIA PLAS-SCNC: 23 UMOL/L
AMPHET UR QL SCN: NEGATIVE
ANION GAP SERPL CALC-SCNC: 8 MMOL/L (ref 5–15)
APPEARANCE UR: CLEAR
AST SERPL-CCNC: 22 U/L (ref 15–37)
BACTERIA URNS QL MICRO: NEGATIVE /HPF
BARBITURATES UR QL SCN: NEGATIVE
BASOPHILS # BLD: 0.1 K/UL (ref 0–0.1)
BASOPHILS NFR BLD: 1 % (ref 0–1)
BENZODIAZ UR QL: NEGATIVE
BILIRUB SERPL-MCNC: 0.5 MG/DL (ref 0.2–1)
BILIRUB UR QL: NEGATIVE
BUN SERPL-MCNC: 13 MG/DL (ref 6–20)
BUN/CREAT SERPL: 14 (ref 12–20)
CALCIUM SERPL-MCNC: 9.3 MG/DL (ref 8.5–10.1)
CANNABINOIDS UR QL SCN: NEGATIVE
CHLORIDE SERPL-SCNC: 107 MMOL/L (ref 97–108)
CO2 SERPL-SCNC: 24 MMOL/L (ref 21–32)
COCAINE UR QL SCN: NEGATIVE
COLOR UR: NORMAL
COMMENT, HOLDF: NORMAL
CREAT SERPL-MCNC: 0.94 MG/DL (ref 0.7–1.3)
DIFFERENTIAL METHOD BLD: NORMAL
DRUG SCRN COMMENT,DRGCM: NORMAL
EOSINOPHIL # BLD: 0.1 K/UL (ref 0–0.4)
EOSINOPHIL NFR BLD: 1 % (ref 0–7)
EPITH CASTS URNS QL MICRO: NORMAL /LPF
ERYTHROCYTE [DISTWIDTH] IN BLOOD BY AUTOMATED COUNT: 12.3 % (ref 11.5–14.5)
ETHANOL SERPL-MCNC: 90 MG/DL
GLOBULIN SER CALC-MCNC: 4 G/DL (ref 2–4)
GLUCOSE BLD STRIP.AUTO-MCNC: 73 MG/DL (ref 65–117)
GLUCOSE SERPL-MCNC: 77 MG/DL (ref 65–100)
GLUCOSE UR STRIP.AUTO-MCNC: NEGATIVE MG/DL
HCT VFR BLD AUTO: 42.3 % (ref 36.6–50.3)
HGB BLD-MCNC: 14.5 G/DL (ref 12.1–17)
HGB UR QL STRIP: NEGATIVE
IMM GRANULOCYTES # BLD AUTO: 0 K/UL (ref 0–0.04)
IMM GRANULOCYTES NFR BLD AUTO: 0 % (ref 0–0.5)
INR PPP: 1.1 (ref 0.9–1.1)
KETONES UR QL STRIP.AUTO: NEGATIVE MG/DL
LEUKOCYTE ESTERASE UR QL STRIP.AUTO: NEGATIVE
LYMPHOCYTES # BLD: 2.5 K/UL (ref 0.8–3.5)
LYMPHOCYTES NFR BLD: 45 % (ref 12–49)
MAGNESIUM SERPL-MCNC: 2.7 MG/DL (ref 1.6–2.4)
MCH RBC QN AUTO: 33.6 PG (ref 26–34)
MCHC RBC AUTO-ENTMCNC: 34.3 G/DL (ref 30–36.5)
MCV RBC AUTO: 97.9 FL (ref 80–99)
METHADONE UR QL: NEGATIVE
MONOCYTES # BLD: 0.7 K/UL (ref 0–1)
MONOCYTES NFR BLD: 11 % (ref 5–13)
NEUTS SEG # BLD: 2.4 K/UL (ref 1.8–8)
NEUTS SEG NFR BLD: 42 % (ref 32–75)
NITRITE UR QL STRIP.AUTO: NEGATIVE
NRBC # BLD: 0 K/UL (ref 0–0.01)
NRBC BLD-RTO: 0 PER 100 WBC
OPIATES UR QL: NEGATIVE
PCP UR QL: NEGATIVE
PH UR STRIP: 5.5 [PH] (ref 5–8)
PHOSPHATE SERPL-MCNC: 3.8 MG/DL (ref 2.6–4.7)
PLATELET # BLD AUTO: 199 K/UL (ref 150–400)
PMV BLD AUTO: 9.3 FL (ref 8.9–12.9)
POTASSIUM SERPL-SCNC: 3.8 MMOL/L (ref 3.5–5.1)
PROT SERPL-MCNC: 8 G/DL (ref 6.4–8.2)
PROT UR STRIP-MCNC: NEGATIVE MG/DL
PROTHROMBIN TIME: 11.8 SEC (ref 9–11.1)
RBC # BLD AUTO: 4.32 M/UL (ref 4.1–5.7)
RBC #/AREA URNS HPF: NORMAL /HPF (ref 0–5)
SAMPLES BEING HELD,HOLD: NORMAL
SERVICE CMNT-IMP: NORMAL
SODIUM SERPL-SCNC: 139 MMOL/L (ref 136–145)
TROPONIN-HIGH SENSITIVITY: 4 NG/L (ref 0–76)
TSH SERPL DL<=0.05 MIU/L-ACNC: 2.29 UIU/ML (ref 0.36–3.74)
UA: UC IF INDICATED,UAUC: NORMAL
UROBILINOGEN UR QL STRIP.AUTO: 0.2 EU/DL (ref 0.2–1)
VIT B12 SERPL-MCNC: 277 PG/ML (ref 193–986)
WBC # BLD AUTO: 5.7 K/UL (ref 4.1–11.1)
WBC URNS QL MICRO: NORMAL /HPF (ref 0–4)

## 2022-07-22 PROCEDURE — 36415 COLL VENOUS BLD VENIPUNCTURE: CPT

## 2022-07-22 PROCEDURE — 82077 ASSAY SPEC XCP UR&BREATH IA: CPT

## 2022-07-22 PROCEDURE — 70496 CT ANGIOGRAPHY HEAD: CPT

## 2022-07-22 PROCEDURE — 74011250637 HC RX REV CODE- 250/637: Performed by: STUDENT IN AN ORGANIZED HEALTH CARE EDUCATION/TRAINING PROGRAM

## 2022-07-22 PROCEDURE — 65270000046 HC RM TELEMETRY

## 2022-07-22 PROCEDURE — 83735 ASSAY OF MAGNESIUM: CPT

## 2022-07-22 PROCEDURE — 80177 DRUG SCRN QUAN LEVETIRACETAM: CPT

## 2022-07-22 PROCEDURE — 84443 ASSAY THYROID STIM HORMONE: CPT

## 2022-07-22 PROCEDURE — 85025 COMPLETE CBC W/AUTO DIFF WBC: CPT

## 2022-07-22 PROCEDURE — 71045 X-RAY EXAM CHEST 1 VIEW: CPT

## 2022-07-22 PROCEDURE — 74011250636 HC RX REV CODE- 250/636: Performed by: NURSE PRACTITIONER

## 2022-07-22 PROCEDURE — 82962 GLUCOSE BLOOD TEST: CPT

## 2022-07-22 PROCEDURE — 84484 ASSAY OF TROPONIN QUANT: CPT

## 2022-07-22 PROCEDURE — 81001 URINALYSIS AUTO W/SCOPE: CPT

## 2022-07-22 PROCEDURE — 0042T CT CODE NEURO PERF W CBF: CPT

## 2022-07-22 PROCEDURE — 84100 ASSAY OF PHOSPHORUS: CPT

## 2022-07-22 PROCEDURE — 99285 EMERGENCY DEPT VISIT HI MDM: CPT

## 2022-07-22 PROCEDURE — 74011000636 HC RX REV CODE- 636: Performed by: RADIOLOGY

## 2022-07-22 PROCEDURE — 80307 DRUG TEST PRSMV CHEM ANLYZR: CPT

## 2022-07-22 PROCEDURE — 96374 THER/PROPH/DIAG INJ IV PUSH: CPT

## 2022-07-22 PROCEDURE — 4A03X5D MEASUREMENT OF ARTERIAL FLOW, INTRACRANIAL, EXTERNAL APPROACH: ICD-10-PCS | Performed by: RADIOLOGY

## 2022-07-22 PROCEDURE — 93005 ELECTROCARDIOGRAM TRACING: CPT

## 2022-07-22 PROCEDURE — 82140 ASSAY OF AMMONIA: CPT

## 2022-07-22 PROCEDURE — 82607 VITAMIN B-12: CPT

## 2022-07-22 PROCEDURE — 80053 COMPREHEN METABOLIC PANEL: CPT

## 2022-07-22 PROCEDURE — 85610 PROTHROMBIN TIME: CPT

## 2022-07-22 PROCEDURE — 70450 CT HEAD/BRAIN W/O DYE: CPT

## 2022-07-22 RX ORDER — ACETAMINOPHEN 325 MG/1
650 TABLET ORAL
Status: DISCONTINUED | OUTPATIENT
Start: 2022-07-22 | End: 2022-07-28 | Stop reason: HOSPADM

## 2022-07-22 RX ORDER — HYDRALAZINE HYDROCHLORIDE 20 MG/ML
10 INJECTION INTRAMUSCULAR; INTRAVENOUS
Status: DISCONTINUED | OUTPATIENT
Start: 2022-07-22 | End: 2022-07-28 | Stop reason: HOSPADM

## 2022-07-22 RX ORDER — LEVETIRACETAM 500 MG/5ML
1000 INJECTION, SOLUTION, CONCENTRATE INTRAVENOUS ONCE
Status: COMPLETED | OUTPATIENT
Start: 2022-07-22 | End: 2022-07-22

## 2022-07-22 RX ORDER — ATORVASTATIN CALCIUM 40 MG/1
40 TABLET, FILM COATED ORAL DAILY
Status: DISCONTINUED | OUTPATIENT
Start: 2022-07-22 | End: 2022-07-28 | Stop reason: HOSPADM

## 2022-07-22 RX ORDER — ACETAMINOPHEN 650 MG/1
650 SUPPOSITORY RECTAL
Status: DISCONTINUED | OUTPATIENT
Start: 2022-07-22 | End: 2022-07-28 | Stop reason: HOSPADM

## 2022-07-22 RX ORDER — GUAIFENESIN 100 MG/5ML
324 LIQUID (ML) ORAL DAILY
Status: DISCONTINUED | OUTPATIENT
Start: 2022-07-22 | End: 2022-07-23

## 2022-07-22 RX ADMIN — IOPAMIDOL 100 ML: 755 INJECTION, SOLUTION INTRAVENOUS at 19:24

## 2022-07-22 RX ADMIN — ATORVASTATIN CALCIUM 40 MG: 40 TABLET, FILM COATED ORAL at 23:11

## 2022-07-22 RX ADMIN — IOPAMIDOL 20 ML: 755 INJECTION, SOLUTION INTRAVENOUS at 19:24

## 2022-07-22 RX ADMIN — LEVETIRACETAM 1000 MG: 100 INJECTION, SOLUTION, CONCENTRATE INTRAVENOUS at 20:41

## 2022-07-22 RX ADMIN — ASPIRIN 81 MG CHEWABLE TABLET 324 MG: 81 TABLET CHEWABLE at 23:11

## 2022-07-22 NOTE — ED NOTES
Level 2 code stroke called from triage, Dr Elbert Knox to bedside to assess pt and pt brought to CT. Charge RN to assign primary RN.

## 2022-07-22 NOTE — PROGRESS NOTES
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 On keppra but patient stated takes only baby aspirin at home. SUBJECTIVE: The patient presented with c/o dizziness and RUE numbness and tingling which seems to be resolved after CT scans was completed. The patient admits daily EtOH use 3-4 bottles/day. Denied use of illicit drug use. Ethanol level was 90. B12 level was low normal 277. BP is 200/107, HR 87, BS is 73. Patient seen by tele-neuro Dr. Christa Cheung with suspicion for possible seizure. Recommended starting keppra home dose, obtain levels, and rEEG with MRI brain. Patient was given 1x dose of Keppra 1gm. Per chart review home dose from 3yr ago was 500mg BID. Will start tomorrow and await levels. NIHSS  1a  Level of consciousness: 0=alert; keenly responsive   1b. LOC questions:  2=Answers neither question correctly   1c. LOC commands: 1=Performs one task correctly   2. Best Gaze: 1=partial gaze palsy; gaze is abnormal in one or both eyes, but forced deviation or total gaze paresis is not present. 3. Visual: 0=No visual loss   4. Facial Palsy: 2=Partial paralysis (total or near total paralysis of the lower face)   5a. Motor left arm: 0=No drift, arm holds 90 (or 45) degrees for full 10 seconds   5b. Motor right arm: 0=No drift, arm holds 90 (or 45) degrees for full 10 seconds   6a. Motor left le=No drift; leg holds 30-degree position for full 5 seconds. 6b  Motor right le=No drift; leg holds 30-degree position for full 5 seconds. 7. Limb Ataxia: 0=Absent   8.   Sensory: 1=Mild to moderate sensory loss; patient feels pinprick is less sharp or is dull on the affected side; or there is a loss of superficial pain with pinprick but patient is aware of being touched. 9. Best Language:  1=Mild to moderate aphasia; some obvious loss of fluency or facility of comprehension without significant limitation on ideas expressed or form of expression. Reduction of speech and/or comprehension, however, makes conversation about provided materials difficult or impossible. For example, in conversation about provided materials, examiner can identify picture or naming card content from patients response. 10. Dysarthria: 0=Normal   11. Extinction and Inattention: 0=No abnormality    Total:    8     Premorbid mRS=2 at times gets help from spouse per patient. VAN: NEGATIVE    tPA Candidate: NO    Mechanical Thrombectomy Candidate: NO    ANTIPLT/AC/ANTITHROMB: YES (Aspirin 81 mg daily)     CT Results (most recent):  Results from Hospital Encounter encounter on 07/22/22    CT CODE NEURO HEAD WO CONTRAST    Narrative  EXAM: CT CODE NEURO HEAD WO CONTRAST    INDICATION: Code Stroke    COMPARISON: 10/31/2019. CONTRAST: None. TECHNIQUE: Unenhanced CT of the head was performed using 5 mm images. Brain and  bone windows were generated. Coronal and sagittal reformats. CT dose reduction  was achieved through use of a standardized protocol tailored for this  examination and automatic exposure control for dose modulation. FINDINGS:  The patient is status post left frontal craniotomy. Postsurgical changes are  also seen in the left temporal region. There is an unchanged area of  encephalomalacia in the left temporal lobe. There are unchanged calcifications  in left basal ganglia, mild left cerebellum, and left cerebral convexity. Ventricles are normal size. No acute intraparenchymal hemorrhage. No extra-axial  fluid collection. The visualized paranasal sinuses are clear. The orbits appear unremarkable. Impression  No significant interval change. No evidence of acute process. Discussed with:     Time spent: 35 minutes.      Yamil Leahy NP  Neurocritical Care Nurse Practitioner  [phone]    Please note that this dictation was completed with [employer], the computer voice recognition software. Quite often unanticipated grammatical, syntax, homophones, and other interpretive errors are inadvertently transcribed by the computer software. Please excuse any errors that have escaped final proofreading.

## 2022-07-22 NOTE — ED PROVIDER NOTES
57-year-old male presents from home [facility] by his wife with a complaint of right-sided facial cheek droop and speech difficulty. Cording to the wife, she last saw him at 3 AM when he was normal.  He had been out at work most of the day and when he returned this evening is when his wife noticed the symptoms. In addition to the droopy face and speech difficulty, he is also experiencing a limp and weakness in his right arm. He is 1 and unable to get his pants down to urinate earlier. He is not sure exactly when they started. Denies any history of previous stroke. Past medical history is significant for epilepsy. Patient does not take any blood thinning medications. Past Medical History:   Diagnosis Date    Epilepsy [facility])        Past Surgical History:   Procedure Laterality Date    NEUROLOGICAL PROCEDURE UNLISTED           No family history on file. Social History     Socioeconomic History    Marital status:      Spouse name: Not on file    Number of children: Not on file    Years of education: Not on file    Highest education level: Not on file   Occupational History    Not on file   Tobacco Use    Smoking status: Never    Smokeless tobacco: Never   Substance and Sexual Activity    Alcohol use: No    Drug use: No    Sexual activity: Yes     Partners: Female   Other Topics Concern    Not on file   Social History Narrative    Not on file     Social Determinants of Health     Financial Resource Strain: Not on file   Food Insecurity: Not on file   Transportation Needs: Not on file   Physical Activity: Not on file   Stress: Not on file   Social Connections: Not on file   Intimate Partner Violence: Not on file   Housing Stability: Not on file         ALLERGIES: Patient has no known allergies. Review of Systems   Constitutional:  Negative for diaphoresis and fever. HENT:  Negative for facial swelling. Eyes:  Negative for visual disturbance. Respiratory:  Negative for cough.     Cardiovascular: Negative for chest pain. Gastrointestinal:  Negative for abdominal pain. Genitourinary:  Negative for dysuria. Musculoskeletal:  Negative for joint swelling. Skin:  Negative for rash. Neurological:  Positive for weakness. Negative for headaches. Hematological:  Negative for adenopathy. Psychiatric/Behavioral:  Negative for suicidal ideas. Vitals:    07/22/22 1903   BP: (!) 142/94   Pulse: 75   Resp: 18   Temp: 98.3 °F (36.8 °C)   SpO2: 98%            Physical Exam  Vitals and nursing note reviewed. Constitutional:       General: He is not in acute distress. Appearance: He is well-developed. He is not ill-appearing. HENT:      Head: Normocephalic and atraumatic. Eyes:      Pupils: Pupils are equal, round, and reactive to light. Cardiovascular:      Rate and Rhythm: Normal rate. Pulmonary:      Effort: Pulmonary effort is normal. No respiratory distress. Abdominal:      General: There is no distension. Musculoskeletal:         General: Normal range of motion. Cervical back: Normal range of motion and neck supple. Skin:     General: Skin is warm and dry. Neurological:      Mental Status: He is alert and oriented to person, place, and time. Comments: Patient awake and alert. He exhibiting right-sided facial weakness as well as some possible expressive aphasia. He is also exhibiting weakness in his right arm and right leg. MDM     Amount and/or Complexity of Data Reviewed  Clinical lab tests: reviewed  Tests in the radiology section of CPT®: reviewed         Perfect Serve Consult for Admission  8:11 PM    ED Room Number: ER07/07  Patient Name and age:  Mart Price 60 y.o.  male  Working Diagnosis:   1. Acute right-sided weakness    2.  Expressive aphasia        COVID-19 Suspicion:  no  Sepsis present:  no  Reassessment needed: no  Code Status:  Full Code  Readmission: no  Isolation Requirements:  no  Recommended Level of Care:  telemetry  Department: [facility] Adult ED - [phone]  Admitting Provider:     Other:  Code stroke for R sided weakness and expressive aphasia. Resolved at this time. Suspect subclinical seizures. No TPA. Given Keppra in ED. Total critical care time spent exclusive of procedures:  35 minutes.     Procedures

## 2022-07-22 NOTE — ED TRIAGE NOTES
Pt arrives for aphasia, right sided facial droop and right leg weakness since earlier this afternoon while at work but pt is unclear what time they started. Pt came home and wife noticed his symptoms. Last time he was seen well was by his wife at [place] [id number] today.

## 2022-07-22 NOTE — ED NOTES
Bedside and Verbal shift change report given by Leanora Cockayne, Neuro NP. Report included the following information SBAR and ED Summary.

## 2022-07-23 ENCOUNTER — APPOINTMENT (OUTPATIENT)
Dept: CT IMAGING | Age: 57
DRG: 065 | End: 2022-07-23
Attending: NURSE PRACTITIONER
Payer: COMMERCIAL

## 2022-07-23 ENCOUNTER — APPOINTMENT (OUTPATIENT)
Dept: MRI IMAGING | Age: 57
DRG: 065 | End: 2022-07-23
Attending: STUDENT IN AN ORGANIZED HEALTH CARE EDUCATION/TRAINING PROGRAM
Payer: COMMERCIAL

## 2022-07-23 LAB
ALBUMIN SERPL-MCNC: 3.4 G/DL (ref 3.5–5)
ALBUMIN/GLOB SERPL: 0.9 {RATIO} (ref 1.1–2.2)
ALP SERPL-CCNC: 63 U/L (ref 45–117)
ALT SERPL-CCNC: 31 U/L (ref 12–78)
ANION GAP SERPL CALC-SCNC: 2 MMOL/L (ref 5–15)
AST SERPL-CCNC: 22 U/L (ref 15–37)
ATRIAL RATE: 52 BPM
BILIRUB SERPL-MCNC: 0.6 MG/DL (ref 0.2–1)
BUN SERPL-MCNC: 14 MG/DL (ref 6–20)
BUN/CREAT SERPL: 14 (ref 12–20)
CALCIUM SERPL-MCNC: 8.8 MG/DL (ref 8.5–10.1)
CALCULATED P AXIS, ECG09: 48 DEGREES
CALCULATED R AXIS, ECG10: 50 DEGREES
CALCULATED T AXIS, ECG11: 2 DEGREES
CHLORIDE SERPL-SCNC: 109 MMOL/L (ref 97–108)
CHOLEST SERPL-MCNC: 142 MG/DL
CO2 SERPL-SCNC: 28 MMOL/L (ref 21–32)
COMMENT, HOLDF: NORMAL
CREAT SERPL-MCNC: 0.97 MG/DL (ref 0.7–1.3)
DIAGNOSIS, 93000: NORMAL
ERYTHROCYTE [DISTWIDTH] IN BLOOD BY AUTOMATED COUNT: 12.4 % (ref 11.5–14.5)
EST. AVERAGE GLUCOSE BLD GHB EST-MCNC: 111 MG/DL
GLOBULIN SER CALC-MCNC: 4 G/DL (ref 2–4)
GLUCOSE BLD STRIP.AUTO-MCNC: 109 MG/DL (ref 65–117)
GLUCOSE SERPL-MCNC: 76 MG/DL (ref 65–100)
HBA1C MFR BLD: 5.5 % (ref 4–5.6)
HCT VFR BLD AUTO: 39.3 % (ref 36.6–50.3)
HDLC SERPL-MCNC: 55 MG/DL
HDLC SERPL: 2.6 {RATIO} (ref 0–5)
HGB BLD-MCNC: 13.3 G/DL (ref 12.1–17)
LDLC SERPL CALC-MCNC: 77.2 MG/DL (ref 0–100)
MAGNESIUM SERPL-MCNC: 2.6 MG/DL (ref 1.6–2.4)
MCH RBC QN AUTO: 33.7 PG (ref 26–34)
MCHC RBC AUTO-ENTMCNC: 33.8 G/DL (ref 30–36.5)
MCV RBC AUTO: 99.5 FL (ref 80–99)
NRBC # BLD: 0 K/UL (ref 0–0.01)
NRBC BLD-RTO: 0 PER 100 WBC
P-R INTERVAL, ECG05: 168 MS
PLATELET # BLD AUTO: 197 K/UL (ref 150–400)
PMV BLD AUTO: 9.5 FL (ref 8.9–12.9)
POTASSIUM SERPL-SCNC: 4.4 MMOL/L (ref 3.5–5.1)
PROT SERPL-MCNC: 7.4 G/DL (ref 6.4–8.2)
Q-T INTERVAL, ECG07: 440 MS
QRS DURATION, ECG06: 100 MS
QTC CALCULATION (BEZET), ECG08: 409 MS
RBC # BLD AUTO: 3.95 M/UL (ref 4.1–5.7)
SAMPLES BEING HELD,HOLD: NORMAL
SERVICE CMNT-IMP: NORMAL
SODIUM SERPL-SCNC: 139 MMOL/L (ref 136–145)
TRIGL SERPL-MCNC: 49 MG/DL (ref ?–150)
VENTRICULAR RATE, ECG03: 52 BPM
VLDLC SERPL CALC-MCNC: 9.8 MG/DL
WBC # BLD AUTO: 6.1 K/UL (ref 4.1–11.1)

## 2022-07-23 PROCEDURE — 83735 ASSAY OF MAGNESIUM: CPT

## 2022-07-23 PROCEDURE — 74011250637 HC RX REV CODE- 250/637: Performed by: NURSE PRACTITIONER

## 2022-07-23 PROCEDURE — 82962 GLUCOSE BLOOD TEST: CPT

## 2022-07-23 PROCEDURE — 80053 COMPREHEN METABOLIC PANEL: CPT

## 2022-07-23 PROCEDURE — 97116 GAIT TRAINING THERAPY: CPT

## 2022-07-23 PROCEDURE — 70553 MRI BRAIN STEM W/O & W/DYE: CPT

## 2022-07-23 PROCEDURE — 36415 COLL VENOUS BLD VENIPUNCTURE: CPT

## 2022-07-23 PROCEDURE — 74011000636 HC RX REV CODE- 636: Performed by: RADIOLOGY

## 2022-07-23 PROCEDURE — 74011250636 HC RX REV CODE- 250/636: Performed by: STUDENT IN AN ORGANIZED HEALTH CARE EDUCATION/TRAINING PROGRAM

## 2022-07-23 PROCEDURE — 74011250637 HC RX REV CODE- 250/637: Performed by: STUDENT IN AN ORGANIZED HEALTH CARE EDUCATION/TRAINING PROGRAM

## 2022-07-23 PROCEDURE — 97530 THERAPEUTIC ACTIVITIES: CPT

## 2022-07-23 PROCEDURE — 65270000046 HC RM TELEMETRY

## 2022-07-23 PROCEDURE — 83036 HEMOGLOBIN GLYCOSYLATED A1C: CPT

## 2022-07-23 PROCEDURE — 95816 EEG AWAKE AND DROWSY: CPT | Performed by: NURSE PRACTITIONER

## 2022-07-23 PROCEDURE — 74011250637 HC RX REV CODE- 250/637: Performed by: PSYCHIATRY & NEUROLOGY

## 2022-07-23 PROCEDURE — 70450 CT HEAD/BRAIN W/O DYE: CPT

## 2022-07-23 PROCEDURE — A9576 INJ PROHANCE MULTIPACK: HCPCS

## 2022-07-23 PROCEDURE — 99255 IP/OBS CONSLTJ NEW/EST HI 80: CPT | Performed by: PSYCHIATRY & NEUROLOGY

## 2022-07-23 PROCEDURE — 85027 COMPLETE CBC AUTOMATED: CPT

## 2022-07-23 PROCEDURE — 74011250636 HC RX REV CODE- 250/636

## 2022-07-23 PROCEDURE — 97161 PT EVAL LOW COMPLEX 20 MIN: CPT

## 2022-07-23 PROCEDURE — 74011000250 HC RX REV CODE- 250: Performed by: STUDENT IN AN ORGANIZED HEALTH CARE EDUCATION/TRAINING PROGRAM

## 2022-07-23 PROCEDURE — 80061 LIPID PANEL: CPT

## 2022-07-23 PROCEDURE — 0042T CT CODE NEURO PERF W CBF: CPT

## 2022-07-23 PROCEDURE — 70496 CT ANGIOGRAPHY HEAD: CPT

## 2022-07-23 RX ORDER — CHLORDIAZEPOXIDE HYDROCHLORIDE 25 MG/1
25 CAPSULE, GELATIN COATED ORAL 4 TIMES DAILY
Status: DISCONTINUED | OUTPATIENT
Start: 2022-07-23 | End: 2022-07-25

## 2022-07-23 RX ORDER — LANOLIN ALCOHOL/MO/W.PET/CERES
500 CREAM (GRAM) TOPICAL DAILY
Status: DISCONTINUED | OUTPATIENT
Start: 2022-07-23 | End: 2022-07-28 | Stop reason: HOSPADM

## 2022-07-23 RX ORDER — SODIUM CHLORIDE 0.9 % (FLUSH) 0.9 %
10 SYRINGE (ML) INJECTION
Status: COMPLETED | OUTPATIENT
Start: 2022-07-23 | End: 2022-07-23

## 2022-07-23 RX ORDER — GUAIFENESIN 100 MG/5ML
81 LIQUID (ML) ORAL DAILY
Status: DISCONTINUED | OUTPATIENT
Start: 2022-07-24 | End: 2022-07-28 | Stop reason: HOSPADM

## 2022-07-23 RX ORDER — LEVETIRACETAM 500 MG/1
500 TABLET ORAL 2 TIMES DAILY
Status: DISCONTINUED | OUTPATIENT
Start: 2022-07-23 | End: 2022-07-25

## 2022-07-23 RX ORDER — DIAZEPAM 10 MG/2ML
10 INJECTION INTRAMUSCULAR
Status: DISCONTINUED | OUTPATIENT
Start: 2022-07-23 | End: 2022-07-28 | Stop reason: HOSPADM

## 2022-07-23 RX ORDER — CLOPIDOGREL BISULFATE 75 MG/1
75 TABLET ORAL DAILY
Status: DISCONTINUED | OUTPATIENT
Start: 2022-07-23 | End: 2022-07-28 | Stop reason: HOSPADM

## 2022-07-23 RX ADMIN — GADOTERIDOL 15 ML: 279.3 INJECTION, SOLUTION INTRAVENOUS at 03:04

## 2022-07-23 RX ADMIN — FOLIC ACID: 5 INJECTION, SOLUTION INTRAMUSCULAR; INTRAVENOUS; SUBCUTANEOUS at 04:05

## 2022-07-23 RX ADMIN — CHLORDIAZEPOXIDE HYDROCHLORIDE 25 MG: 25 CAPSULE ORAL at 19:17

## 2022-07-23 RX ADMIN — CLOPIDOGREL BISULFATE 75 MG: 75 TABLET ORAL at 13:34

## 2022-07-23 RX ADMIN — IOPAMIDOL 50 ML: 755 INJECTION, SOLUTION INTRAVENOUS at 16:28

## 2022-07-23 RX ADMIN — LEVETIRACETAM 500 MG: 500 TABLET, FILM COATED ORAL at 19:17

## 2022-07-23 RX ADMIN — CHLORDIAZEPOXIDE HYDROCHLORIDE 25 MG: 25 CAPSULE ORAL at 11:32

## 2022-07-23 RX ADMIN — CHLORDIAZEPOXIDE HYDROCHLORIDE 25 MG: 25 CAPSULE ORAL at 13:34

## 2022-07-23 RX ADMIN — SODIUM CHLORIDE, PRESERVATIVE FREE 10 ML: 5 INJECTION INTRAVENOUS at 03:05

## 2022-07-23 RX ADMIN — CHLORDIAZEPOXIDE HYDROCHLORIDE 25 MG: 25 CAPSULE ORAL at 23:50

## 2022-07-23 RX ADMIN — IOPAMIDOL 100 ML: 755 INJECTION, SOLUTION INTRAVENOUS at 16:28

## 2022-07-23 RX ADMIN — CYANOCOBALAMIN TAB 500 MCG 500 MCG: 500 TAB at 11:32

## 2022-07-23 NOTE — CONSULTS
INPATIENT NEUROLOGY CONSULTATION  7/23/2022     Consulted by: Koffi Schaeffer DO        Patient ID:  Felipe Janice  [id number]  60 y.o.  1965    CC: Ana Khan is a 47-year-old gentleman with a history of traumatic brain injury (subdural hemorrhage requiring craniotomy) who has a remote history of seizure disorder as a result. He is admitted here because he developed strokelike symptoms manifesting as difficulty speaking and right-sided weakness still persisting now. MRI brain was completed showing a left MCA infarct but also evidence of old left hemispheric disease and evidence of craniotomy. CTA showing right vertebral artery stenosis. At the moment he has no headache. Still feels a little bit weak on the right but better. Takes a daily baby aspirin he tells me. No longer on any seizure medication. Review of Systems   Neurological:  Positive for speech change and focal weakness. Negative for headaches. All other systems reviewed and are negative. Past Medical History:   Diagnosis Date    [facility]      No family history on file.   Social History     Socioeconomic History    Marital status:      Spouse name: Not on file    Number of children: Not on file    Years of education: Not on file    Highest education level: Not on file   Occupational History    Not on file   Tobacco Use    Smoking status: Never    Smokeless tobacco: Never   Substance and Sexual Activity    Alcohol use: No    Drug use: No    Sexual activity: Yes     Partners: Female   Other Topics Concern    Not on file   Social History Narrative    Not on file     Social Determinants of Health     Financial Resource Strain: Not on file   Food Insecurity: Not on file   Transportation Needs: Not on file   Physical Activity: Not on file   Stress: Not on file   Social Connections: Not on file   Intimate Partner Violence: Not on file   Housing Stability: Not on file     Current Facility-Administered Medications Medication Dose Route Frequency    chlordiazePOXIDE (LIBRIUM) capsule 25 mg  25 mg Oral QID    diazePAM (VALIUM) injection 10 mg  10 mg IntraVENous Q3H PRN    cyanocobalamin (VITAMIN B12) tablet 500 mcg  500 mcg Oral DAILY    [START ON 7/24/2022] aspirin chewable tablet 81 mg  81 mg Oral DAILY    clopidogreL (PLAVIX) tablet 75 mg  75 mg Oral DAILY    atorvastatin (LIPITOR) tablet 40 mg  40 mg Oral DAILY    hydrALAZINE (APRESOLINE) 20 mg/mL injection 10 mg  10 mg IntraVENous Q6H PRN    acetaminophen (TYLENOL) tablet 650 mg  650 mg Oral Q4H PRN    Or    acetaminophen (TYLENOL) solution 650 mg  650 mg Per NG tube Q4H PRN    Or    acetaminophen (TYLENOL) suppository 650 mg  650 mg Rectal Q4H PRN     No Known Allergies    Visit Vitals  /82   Pulse (!) 54   Temp 97.9 °F (36.6 °C) Comment: admitted to the floor   Resp 13   Ht 5' 8\" (1.727 m)   Wt 190 lb (86.2 kg)   SpO2 97%   BMI 28.89 kg/m²     Physical Exam  Constitutional:       Appearance: Normal appearance. He is normal weight. Cardiovascular:      Rate and Rhythm: Normal rate. Pulmonary:      Effort: Pulmonary effort is normal.   Neurological:      Mental Status: He is alert. Neurologic Exam     Mental Status   Age-appropriate gentleman awake and alert. Follows commands. Supine in bed. Pupils equal reactive symmetric with a conjugate gaze. Face is asymmetric to the right. Tongue is midline. Speech is slightly slurred with a mild motor aphasia to complex phrases.   Right arm and leg trace weakness 5 -, left side intact 5/5  Sensation reduced on the right compared to the left  No abnormal movements  Gait deferred          Lab Results   Component Value Date/Time    WBC 6.1 07/23/2022 04:34 AM    HGB 13.3 07/23/2022 04:34 AM    HCT 39.3 07/23/2022 04:34 AM    PLATELET 774 20/88/2224 04:34 AM    MCV 99.5 (H) 07/23/2022 04:34 AM     Lab Results   Component Value Date/Time    Hemoglobin A1c 5.5 07/23/2022 04:34 AM    Hemoglobin A1c 5.7 10/31/2019 06:34 PM    Glucose 76 07/23/2022 07:19 AM    Glucose (POC) 73 07/22/2022 07:28 PM    LDL, calculated 77.2 07/23/2022 07:19 AM    Creatinine 0.97 07/23/2022 07:19 AM      Lab Results   Component Value Date/Time    Cholesterol, total 142 07/23/2022 07:19 AM    HDL Cholesterol 55 07/23/2022 07:19 AM    LDL, calculated 77.2 07/23/2022 07:19 AM    Triglyceride 49 07/23/2022 07:19 AM    CHOL/HDL Ratio 2.6 07/23/2022 07:19 AM     Lab Results   Component Value Date/Time    ALT (SGPT) 31 07/23/2022 07:19 AM    Alk. phosphatase 63 07/23/2022 07:19 AM    Bilirubin, total 0.6 07/23/2022 07:19 AM    Albumin 3.4 (L) 07/23/2022 07:19 AM    Protein, total 7.4 07/23/2022 07:19 AM    Ammonia, plasma 23 07/22/2022 07:59 PM    INR 1.1 07/22/2022 07:20 PM    Prothrombin time 11.8 (H) 07/22/2022 07:20 PM    PLATELET 636 52/93/2350 04:34 AM        CT Results (maximum last 3): Results from [place] encounter on 07/22/22    CT CODE NEURO PERF W CBF    Narrative  *PRELIMINARY REPORT*    No evidence of large vessel occlusion. Preliminary report was provided by Dr. Michael Mitchell, the on-call radiologist, at 7:40 PM  on 7/22/2022    Final report to follow. *END PRELIMINARY REPORT*    CLINICAL HISTORY: Code stroke. Altered mental status. EXAMINATION:  CT ANGIOGRAPHY HEAD AND NECK, CT PERFUSION    COMPARISON: CTA head and neck October 2019    TECHNIQUE:  Following the uneventful administration of iodinated contrast  material, axial CT angiography of the head and neck was performed. Delayed axial  images through the head were also obtained. Coronal and sagittal reconstructions  were obtained. Manual postprocessing of images was performed. 3-D  Sagittal  maximal intensity projection images were obtained. 3-D Coronal maximal  intensity projections were obtained. CT brain perfusion was performed with  generation of hemodynamic maps of multiple parameters, including cerebral blood  flow, cerebral blood volume, mean transit time, and TMAX.  CT dose reduction was  achieved through use of a standardized protocol tailored for this examination  and automatic exposure control for dose modulation. This study was analyzed by  the [address] ai algorithm. FINDINGS:    Delayed contrast-enhanced head CT:    Postsurgical changes from left frontal craniotomy. Parenchymal calcifications in  the left periventricular frontal lobe. Encephalomalacia in the left temporal  lobe. CTA NECK:    Great vessels: Normal arch anatomy with the origins patent. Right subclavian artery: Patent    Left subclavian artery: Patent    Right common carotid artery: Patent    Left common carotid artery: Patent    Cervical right internal carotid artery: Patent with no significant stenosis by  NASCET criteria. Cervical left internal carotid artery: Patent with no significant stenosis by  NASCET criteria. Right vertebral artery: Moderate stenosis of the proximal right vertebral  artery. Remainder of the right vertebral artery is patent. Left vertebral artery: Patent    The lung apices are clear. The thyroid is homogeneous. No cervical  lymphadenopathy. CTA HEAD:    Right cavernous internal carotid artery: Patent    Left cavernous internal carotid artery: Patent    Anterior cerebral arteries: Patent    Anterior communicating artery: Patent    Right middle cerebral artery: Patent    Left middle cerebral artery: Patent    Posterior communicating arteries: Diminutive    Posterior cerebral arteries: Patent    Basilar artery: Patent    Distal vertebral arteries: Patent    No evidence for intracranial aneurysm or hemodynamically significant stenosis. CT Perfusion:  Normal CT perfusion. Impression  1. No large vessel occlusion or hemodynamically significant carotid stenosis. 2.  Moderate stenosis of the proximal right vertebral artery, similar to the  prior study from 2019. 3.  No perfusion abnormality.       CTA CODE NEURO HEAD AND NECK W CONT    Narrative  *PRELIMINARY REPORT*    No evidence of large vessel occlusion. Preliminary report was provided by Dr. Shawn Damon, the on-call radiologist, at 7:40 PM  on 7/22/2022    Final report to follow. *END PRELIMINARY REPORT*    CLINICAL HISTORY: Code stroke. Altered mental status. EXAMINATION:  CT ANGIOGRAPHY HEAD AND NECK, CT PERFUSION    COMPARISON: CTA head and neck October 2019    TECHNIQUE:  Following the uneventful administration of iodinated contrast  material, axial CT angiography of the head and neck was performed. Delayed axial  images through the head were also obtained. Coronal and sagittal reconstructions  were obtained. Manual postprocessing of images was performed. 3-D  Sagittal  maximal intensity projection images were obtained. 3-D Coronal maximal  intensity projections were obtained. CT brain perfusion was performed with  generation of hemodynamic maps of multiple parameters, including cerebral blood  flow, cerebral blood volume, mean transit time, and TMAX. CT dose reduction was  achieved through use of a standardized protocol tailored for this examination  and automatic exposure control for dose modulation. This study was analyzed by  the [address] ai algorithm. FINDINGS:    Delayed contrast-enhanced head CT:    Postsurgical changes from left frontal craniotomy. Parenchymal calcifications in  the left periventricular frontal lobe. Encephalomalacia in the left temporal  lobe. CTA NECK:    Great vessels: Normal arch anatomy with the origins patent. Right subclavian artery: Patent    Left subclavian artery: Patent    Right common carotid artery: Patent    Left common carotid artery: Patent    Cervical right internal carotid artery: Patent with no significant stenosis by  NASCET criteria. Cervical left internal carotid artery: Patent with no significant stenosis by  NASCET criteria. Right vertebral artery: Moderate stenosis of the proximal right vertebral  artery.  Remainder of the right vertebral artery is patent. Left vertebral artery: Patent    The lung apices are clear. The thyroid is homogeneous. No cervical  lymphadenopathy. CTA HEAD:    Right cavernous internal carotid artery: Patent    Left cavernous internal carotid artery: Patent    Anterior cerebral arteries: Patent    Anterior communicating artery: Patent    Right middle cerebral artery: Patent    Left middle cerebral artery: Patent    Posterior communicating arteries: Diminutive    Posterior cerebral arteries: Patent    Basilar artery: Patent    Distal vertebral arteries: Patent    No evidence for intracranial aneurysm or hemodynamically significant stenosis. CT Perfusion:  Normal CT perfusion. Impression  1. No large vessel occlusion or hemodynamically significant carotid stenosis. 2.  Moderate stenosis of the proximal right vertebral artery, similar to the  prior study from 2019. 3.  No perfusion abnormality. MRI Results (maximum last 3): Results from [place] encounter on 07/22/22    MRI BRAIN W WO CONT    Narrative  *PRELIMINARY REPORT*    Small focal acute infarct at the left corona radiata. Left-sided craniotomy changes and underlying encephalomalacia/postsurgical  change. Preliminary report was provided by Dr. Vale Rollins, the on-call radiologist, at  03:11. The findings were called to Dr. Cole Kyle on 7/23/2022 at 03:13 by myself. [id number]    Final report to follow. *END PRELIMINARY REPORT*      VAS/US/Carotid Doppler Results (maximum last 3): No results found for this or any previous visit. PET Results (maximum last 3): No results found for this or any previous visit. Assessment and Plan        70-year-old gentleman who has new ischemia/brain injury in the left MCA territory. He is already taking low-dose aspirin daily and given the right vertebral artery stenosis I am going to add Plavix to optimize prevention of further stroke. PT OT speech needs. No clinical seizure activity.   Defer any anticonvulsants. Watch for alcohol withdrawal.  He has a reported alcohol consumption history and a CIWA protocol has been recommended at admission. Stay on the current statin for his LDL obtained today. Stroke work-up with echo. Stay on telemetry tonight. Any acute changes activate code stroke. This clinical note was dictated with an electronic dictation software that can make unintentional errors. If there are any questions, please contact me directly for clarification.       [address] [facility],   NEUROLOGIST  Diplomate ANJUM  7/23/2022

## 2022-07-23 NOTE — PROGRESS NOTES
CM attempted to speak with the patient via room phone, no answer, Call placed to patient's wife and voice message was left.       1:36 PM  PRAFUL Snyder

## 2022-07-23 NOTE — PROGRESS NOTES
Neurocritical Care Code Stroke Documentation      Symptoms:   Worsening of his baseline right sided weakness, symptoms began to improve some during Code S. Baseline mRS:   2   Last Known Well:  Unclear   Medical hx: Past Medical History:   Diagnosis Date    Epilepsy ([facility]       Anticoagulation:  None   VAN:   Negative   NIHSS:   1a-LOC:0    1b-Month/Age:0    1c-Open/Close Hand:0    2-Best Gaze:0    3-Visual Fields:0    4-Facial Palsy:1 (initially 2 then improved)    5a-Left Arm:0    5b-Right Arm:0    6a-Left Le    6b-Right Le    7-Limb Ataxia:0    8-Sensory:0    9-Best Language:0    10-Dysarthria:0    11-Extinction/Inattention:0  TOTAL SCORE:2   Imaging:   CT 1. No intracranial hemorrhage. 2. Subtle CT evidence of an evolving acute left corona radiata infarct, in  correlation with recent prior MRI. 3. No other significant change    CTA negative for LVO   Plan:   TNK Candidate: NO    Mechanical thrombectomy Candidate: NO    Keppra 500 mg BID added per teleneuro recommendations      *Perform dysphagia screening prior to any PO intake*    Discussed with: Dr. Jemal Caicedo ([employer]), states to add Keppra 500 mg BID as this is likely related to seizure. She states she does not need to evaluate on screen. Arrival time: 0060  Time spent: 35 minutes.      Santhosh Marcos NP  Neurocritical Care Nurse Practitioner  [phone]

## 2022-07-23 NOTE — ROUTINE PROCESS
TRANSFER - OUT REPORT:    Verbal report given to Shama(name) on Bill Daly  being transferred to NSTU(unit) for routine progression of care       Report consisted of patients Situation, Background, Assessment and   Recommendations(SBAR). Information from the following report(s) SBAR, Kardex, ED Summary, and Recent Results was reviewed with the receiving nurse. Lines:   Peripheral IV 07/22/22 Left Antecubital (Active)   Site Assessment Clean, dry, & intact 07/22/22 1925   Phlebitis Assessment 0 07/22/22 1925   Infiltration Assessment 0 07/22/22 1925   Dressing Status Clean, dry, & intact 07/22/22 1925   Dressing Type Transparent 07/22/22 1925   Hub Color/Line Status Green 07/22/22 1925       Peripheral IV 07/22/22 Right Antecubital (Active)   Site Assessment Clean, dry, & intact 07/22/22 1925   Phlebitis Assessment 0 07/22/22 1925   Infiltration Assessment 0 07/22/22 1925   Dressing Status Clean, dry, & intact 07/22/22 1925   Dressing Type Transparent 07/22/22 1925   Hub Color/Line Status Blue;Pink 07/22/22 1925        Opportunity for questions and clarification was provided.       Patient transported with:   [email or web address]

## 2022-07-23 NOTE — ED NOTES
Verbal shift change report given to Cindi Wolf RN (oncoming nurse) by Theodis Mcburney, RN (offgoing nurse). Report included the following information   .  SBAR/ ED Summary/ MAR/ IO

## 2022-07-23 NOTE — H&P
PLEASE NOTE: I HAVE GENERATED THIS NOTE WITH THE ASSISTANCE OF VOICE-RECOGNITION TECHNOLOGY. PLEASE EXCUSE ANY SPELLING, GRAMMATICAL, AND SYNTAX ERRORS YOU MAY FIND. IF YOU NEED CLARIFICATION ON ANYTHING, PLEASE FEEL FREE TO REACH OUT TO ME.  [address] [facility] Adult  Hospitalist Group  History and Physical - Dr. Manpreet Huddleston    Assessment:     Active Problems:    Tobacco abuse (8/4/2012)      Alcohol abuse (8/4/2012)      Dysarthria (10/31/2019)      CVA (cerebral vascular accident) ([address] (7/22/2022)        Plan:     TIA: Stroke work-up, MRI brain, echo, stroke order set; Lipitor and aspirin  ? Alcohol abuse: CIWA, aspiration, fall, seizure, as needed Valium  Tobacco abuse: Advised cessation    If code status was discussed with the patient, then code status entered as per the orders      Subjective:     Primary Care Provider: None  Date of Service:  See Date Note Was Originated  Chief Complaint: Right-sided weakness    60 y.o. male presents with a few hours duration of gradual onset, initially gradually worsening, severe, intermittent, now resolved, right-sided weakness that was associated with mild facial droop, everything is now resolved, remitted by nothing, exacerbated by nothing. Apparently, the patient's last known well was around noon, he is a , patient's wife last saw him at 3 AM    Review of Systems:  12 point ROS obtained and otherwise negative, except as per HPI and above. Past Medical History:   Diagnosis Date    Epilepsy [facility])       Past Surgical History:   Procedure Laterality Date    NEUROLOGICAL PROCEDURE UNLISTED       Prior to Admission medications    Not on File     No Known Allergies   No family history on file. .  Social History     Socioeconomic History    Marital status:    Tobacco Use    Smoking status: Never    Smokeless tobacco: Never   Substance and Sexual Activity    Alcohol use: No    Drug use: No    Sexual activity: Yes     Partners: Female   Normal    Objective:     Physical Exam:     VS as below    Const'l:          Normal body habitus, a&o, no acute distress  Head/Neck:       neck supple, no cervical/head mass  Eyes:     nonicteric sclera, eom intact  ENT:      auditory acuity grossly intact either with or without device, no nasal deformity  Cardio:           Regular rate regular rhythm  Pulm:     no accessory muscle use, clear tab  Abd:       S NT ND  Derm:     no rashes, no ulcers, no lesions  Extr:      No edema, no cyanosis, no calf tenderness, no varicosities  Deformity and right hand, patient and his wife say that this is been there for a long time, he had broken his thumb and never got it fixed  Neuro:    cn II-XII grossly intact, muscle strength intact  Psych:   mood very pleasant, judgement intact    Data Review: All diagnostic labs and studies have been reviewed.     .    Signed By: Everette España DO

## 2022-07-23 NOTE — PROGRESS NOTES
Orders received, chart reviewed and patient evaluated by physical therapy. Pending progression with skilled acute physical therapy, recommend: To be determined: Outpatient physical therapy follow up recommended for balance vs none pending progress. Recommend with nursing OOB to chair 3x/day and walking daily with stand by assist and  gait belt . Thank you for completing as able in order to maintain patient strength, endurance and independence. Full evaluation to follow.

## 2022-07-23 NOTE — PROGRESS NOTES
Occupational Therapy:    Chart reviewed and attempted to see for OT session, however patient completing bedside EEG at this time. Will continue to follow up and attempt as able.      Yi Iverson, OT

## 2022-07-23 NOTE — PROGRESS NOTES
Problem: Mobility Impaired (Adult and Pediatric)  Goal: *Acute Goals and Plan of Care (Insert Text)  Description:   FUNCTIONAL STATUS PRIOR TO ADMISSION: Patient was independent and active without use of DME, worked as a  delivering drinks. HOME SUPPORT PRIOR TO ADMISSION: The patient lived with his wife but did not require assist.    Physical Therapy Goals  Initiated 7/23/2022  1. Patient will move from supine to sit and sit to supine  in bed with independence within 7 day(s). 2.  Patient will transfer from bed to chair and chair to bed with independence using the least restrictive device within 7 day(s). 3.  Patient will perform sit to stand with independence within 7 day(s). 4.  Patient will ambulate with independence for 500 feet with the least restrictive device within 7 day(s). 5.  Patient will ascend/descend 12 stairs with handrail(s) with modified independence within 7 day(s). 6.  Patient will improve Hill Balance score by 7 points within 7 days. Outcome: Not Met     PHYSICAL THERAPY EVALUATION- NEURO POPULATION  Patient: Sherry Elliott (57 y.o. male)  Date: 7/23/2022  Primary Diagnosis: CVA (cerebral vascular accident) [facility]) [I63.9]       Precautions:  Fall      ASSESSMENT  Based on the objective data described below, the patient presents with impaired balance, increased fall risk, RUE dysmetria (mild), right facial droop, and mild RUE weakness s/p admission for CVA. MRI shows a small focal acute infarct at the left corona radiata. Received pt resting in bed reporting improvement in his RUE strength and sensation. He had difficulty getting out of bed though able to complete on his own with extra time and ambulated around the ED with slow mildly unsteady gait. He ambulates close to walls and objects on the right and at times makes contact. He had one episode of unsteadiness requiring assistance to prevent a fall when asked to take his lunch bag from this PT and carry it. Made RN aware of pt's fall risk when ambulating. At baseline, pt is independent and works as a  making local deliveries. Acute therapy will follow. Current Level of Function Impacting Discharge (mobility/balance): SBA supine<->sit; Supervision sit<->stand; CGA ambulating w/ no device    Functional Outcome Measure: The patient scored Total: 37/56 on the Hill Balance Assessment which is indicative of moderate fall risk. Other factors to consider for discharge: fall risk; PLOF indep and working     Patient will benefit from skilled therapy intervention to address the above noted impairments. PLAN :  Recommendations and Planned Interventions: bed mobility training, transfer training, gait training, neuromuscular re-education, patient and family training/education, and therapeutic activities      Frequency/Duration: Patient will be followed by physical therapy:  3 times a week to address goals.     Recommendation for discharge: (in order for the patient to meet his/her long term goals)  To be determined: OPPT for balance and gait vs none pending progress    This discharge recommendation:  Has not yet been discussed the attending provider and/or case management    IF patient discharges home will need the following DME: none anticipated         SUBJECTIVE:       OBJECTIVE DATA SUMMARY:   HISTORY:    Past Medical History:   Diagnosis Date    Epilepsy ([facility] [address]      Past Surgical History:   Procedure Laterality Date    NEUROLOGICAL PROCEDURE UNLISTED         Personal factors and/or comorbidities impacting plan of care: PMH, [facility]    Home Situation  Home Environment: Private residence  # Steps to Enter: 4  Rails to Enter: Yes  One/Two Story Residence: Two story  Living Alone: No  Support Systems: Spouse/Significant Other  Patient Expects to be Discharged to[de-identified] Home  Current DME Used/Available at Home: None  Tub or Shower Type: Shower    EXAMINATION/PRESENTATION/DECISION MAKING:   Critical Behavior:  Neurologic State: Alert  Orientation Level: Oriented X4  Cognition: Appropriate decision making, Appropriate for age attention/concentration, Appropriate safety awareness, Follows commands  Safety/Judgement: Awareness of environment  Hearing:  Intact  Skin:  Exposed areas grossly intact  Edema: none noted  Range Of Motion:  AROM: Within functional limits BLEs & LUE; Decreased functional RUE (h/o shoulder impairment))                       Strength:    Strength: Within functional limits (BLEs) rt = lt                    Tone & Sensation:   Tone: Normal              Sensation: Intact               Coordination:  Intact  Finger to nose: RUE dysmetria; LUE normal  Heel to shin: normal   No upper extremity drift  Vision:   Tracking: Able to track stimulus in all quadrants w/o difficulty  Diplopia: No  Corrective Lenses: Reading glasses  Functional Mobility:  Bed Mobility:  Supine to Sit: Stand-by assistance; Additional time; HOB partially raised  Sit to Supine: Stand-by assistance  Intervention: cues to improve hand placement, LE positioning and technique. Noted improvement in speed and ability with second attempt. Transfers:  Sit to Stand: Supervision  Stand to Sit: Supervision                    Balance:   Sitting: Intact; Without support  Standing: Impaired; Without support  Standing - Static: Fair;Good;Occasional  Standing - Dynamic : Fair;Occasional  Ambulation/Gait Training:  Distance (ft): 300 Feet (ft)  Assistive Device: Gait belt  Ambulation - Level of Assistance: Contact guard assistance; Other (comment) (Min A on two occasions w/ LOB)     Gait Description (WDL): Exceptions to WDL  Gait Abnormalities: Path deviations; Other (LOB w/ distractions, head movement); brushes up against objects on the right. Speed/Jennifer: Slow     Intervention: VC, challenges (head turns, carrying objects). Educated pt in his fall risk and to call for assistance before getting up.            Functional Measure  Hill Balance Test:    Sitting to Standin  Standing Unsupported: 4  Sitting with Back Unsupported: 4  Standing to Sittin  Transfers: 4  Standing Unsupported with Eyes Closed: 3  Standing Unsupported with Feet Together: 4  Reach Forward with Outstretched Arm: 2   Object: 3  Turn to Look Over Shoulders: 2  Turn 360 Degrees: 1  Alternate Foot on Step/Stool: 2  Standing Unsupported One Foot in Front: 0  Stand on One Le  Total: 37/56         56=Maximum possible score;   0-20=High fall risk  21-40=Moderate fall risk   41-56=Low fall risk        Physical Therapy Evaluation Charge Determination   History Examination Presentation Decision-Making   MEDIUM  Complexity : 1-2 comorbidities / personal factors will impact the outcome/ POC  MEDIUM Complexity : 3 Standardized tests and measures addressing body structure, function, activity limitation and / or participation in recreation  LOW Complexity : Stable, uncomplicated  Other outcome measures Silver 37/56  HIGH       Based on the above components, the patient evaluation is determined to be of the following complexity level: LOW     Pain Rating:  None indicated    Activity Tolerance:   Good and Fair      After treatment patient left in no apparent distress:   Call bell within reach and Sitting upright in the bed with bilateral bed rails (ED plinth) and meal tray    COMMUNICATION/EDUCATION:   The patients plan of care was discussed with: Registered nurse. Patient was educated regarding his deficit(s) of RUE dysmetria, facial droop and impaired balance as this relates to his diagnosis of CVA. He demonstrated Fair understanding. Patient and/or family was verbally educated on the BE FAST acronym for signs/symptoms of CVA and TIA. BE FAST handout was given to patient  for visual education and reinforcement. All questions answered with patient indicating fair understanding.        Fall prevention education was provided and the patient/caregiver indicated understanding., Patient/family have participated as able in goal setting and plan of care. , and Patient/family agree to work toward stated goals and plan of care.     Thank you for this referral.  Diego Howard, PT   Time Calculation: 42 mins

## 2022-07-24 ENCOUNTER — APPOINTMENT (OUTPATIENT)
Dept: GENERAL RADIOLOGY | Age: 57
DRG: 065 | End: 2022-07-24
Attending: NURSE PRACTITIONER
Payer: COMMERCIAL

## 2022-07-24 ENCOUNTER — APPOINTMENT (OUTPATIENT)
Dept: NON INVASIVE DIAGNOSTICS | Age: 57
DRG: 065 | End: 2022-07-24
Attending: STUDENT IN AN ORGANIZED HEALTH CARE EDUCATION/TRAINING PROGRAM
Payer: COMMERCIAL

## 2022-07-24 ENCOUNTER — APPOINTMENT (OUTPATIENT)
Dept: CT IMAGING | Age: 57
DRG: 065 | End: 2022-07-24
Attending: NURSE PRACTITIONER
Payer: COMMERCIAL

## 2022-07-24 LAB
ASPIRIN TEST, ASPIRN: 387 ARU
GLUCOSE BLD STRIP.AUTO-MCNC: 126 MG/DL (ref 65–117)
P2Y12 PLT RESPONSE,PPPR: 191 PRU (ref 194–418)
SERVICE CMNT-IMP: ABNORMAL

## 2022-07-24 PROCEDURE — 92522 EVALUATE SPEECH PRODUCTION: CPT

## 2022-07-24 PROCEDURE — 73030 X-RAY EXAM OF SHOULDER: CPT

## 2022-07-24 PROCEDURE — 74011000250 HC RX REV CODE- 250: Performed by: NURSE PRACTITIONER

## 2022-07-24 PROCEDURE — 74011250637 HC RX REV CODE- 250/637: Performed by: PSYCHIATRY & NEUROLOGY

## 2022-07-24 PROCEDURE — 70450 CT HEAD/BRAIN W/O DYE: CPT

## 2022-07-24 PROCEDURE — 85576 BLOOD PLATELET AGGREGATION: CPT

## 2022-07-24 PROCEDURE — 74011000636 HC RX REV CODE- 636: Performed by: RADIOLOGY

## 2022-07-24 PROCEDURE — 94760 N-INVAS EAR/PLS OXIMETRY 1: CPT

## 2022-07-24 PROCEDURE — 36415 COLL VENOUS BLD VENIPUNCTURE: CPT

## 2022-07-24 PROCEDURE — 74011250636 HC RX REV CODE- 250/636: Performed by: NURSE PRACTITIONER

## 2022-07-24 PROCEDURE — 74011250637 HC RX REV CODE- 250/637: Performed by: STUDENT IN AN ORGANIZED HEALTH CARE EDUCATION/TRAINING PROGRAM

## 2022-07-24 PROCEDURE — 65270000046 HC RM TELEMETRY

## 2022-07-24 PROCEDURE — 74011250637 HC RX REV CODE- 250/637: Performed by: NURSE PRACTITIONER

## 2022-07-24 PROCEDURE — 0042T CT PERF W CBF: CPT

## 2022-07-24 PROCEDURE — 92610 EVALUATE SWALLOWING FUNCTION: CPT

## 2022-07-24 PROCEDURE — 82962 GLUCOSE BLOOD TEST: CPT

## 2022-07-24 RX ORDER — LIDOCAINE 4 G/100G
1 PATCH TOPICAL EVERY 24 HOURS
Status: DISCONTINUED | OUTPATIENT
Start: 2022-07-24 | End: 2022-07-28 | Stop reason: HOSPADM

## 2022-07-24 RX ORDER — LANOLIN ALCOHOL/MO/W.PET/CERES
3 CREAM (GRAM) TOPICAL
Status: DISCONTINUED | OUTPATIENT
Start: 2022-07-24 | End: 2022-07-28 | Stop reason: HOSPADM

## 2022-07-24 RX ORDER — SODIUM CHLORIDE 9 MG/ML
1000 INJECTION, SOLUTION INTRAVENOUS ONCE
Status: COMPLETED | OUTPATIENT
Start: 2022-07-24 | End: 2022-07-24

## 2022-07-24 RX ADMIN — Medication 3 MG: at 21:19

## 2022-07-24 RX ADMIN — SODIUM CHLORIDE 1000 ML: 9 INJECTION, SOLUTION INTRAVENOUS at 03:02

## 2022-07-24 RX ADMIN — CYANOCOBALAMIN TAB 500 MCG 500 MCG: 500 TAB at 09:14

## 2022-07-24 RX ADMIN — IOPAMIDOL 100 ML: 755 INJECTION, SOLUTION INTRAVENOUS at 03:00

## 2022-07-24 RX ADMIN — ACETAMINOPHEN 650 MG: 325 TABLET ORAL at 17:48

## 2022-07-24 RX ADMIN — CHLORDIAZEPOXIDE HYDROCHLORIDE 25 MG: 25 CAPSULE ORAL at 17:47

## 2022-07-24 RX ADMIN — ACETAMINOPHEN 650 MG: 325 TABLET ORAL at 23:02

## 2022-07-24 RX ADMIN — ASPIRIN 81 MG CHEWABLE TABLET 81 MG: 81 TABLET CHEWABLE at 09:14

## 2022-07-24 RX ADMIN — CHLORDIAZEPOXIDE HYDROCHLORIDE 25 MG: 25 CAPSULE ORAL at 09:14

## 2022-07-24 RX ADMIN — ATORVASTATIN CALCIUM 40 MG: 40 TABLET, FILM COATED ORAL at 09:14

## 2022-07-24 RX ADMIN — ACETAMINOPHEN 650 MG: 325 TABLET ORAL at 14:00

## 2022-07-24 RX ADMIN — CHLORDIAZEPOXIDE HYDROCHLORIDE 25 MG: 25 CAPSULE ORAL at 21:12

## 2022-07-24 RX ADMIN — LEVETIRACETAM 500 MG: 500 TABLET, FILM COATED ORAL at 17:47

## 2022-07-24 RX ADMIN — ACETAMINOPHEN 650 MG: 325 TABLET ORAL at 03:02

## 2022-07-24 RX ADMIN — Medication 3 MG: at 03:02

## 2022-07-24 RX ADMIN — LEVETIRACETAM 500 MG: 500 TABLET, FILM COATED ORAL at 09:14

## 2022-07-24 RX ADMIN — CHLORDIAZEPOXIDE HYDROCHLORIDE 25 MG: 25 CAPSULE ORAL at 14:00

## 2022-07-24 RX ADMIN — CLOPIDOGREL BISULFATE 75 MG: 75 TABLET ORAL at 09:14

## 2022-07-24 NOTE — PROGRESS NOTES
Problem: Motor Speech Impaired (Adult)  Goal: *Acute Goals and Plan of Care (Insert Text)  Description: Speech Therapy Goals  Initiated 7/24/22    1. Patient will utilize compensatory strategies to improve articulatory precision within 7 days. Outcome: Progressing Towards Goal    SPEECH LANGUAGE PATHOLOGY BEDSIDE SWALLOW AND SPEECH EVALUATIONS  Patient: Tobin Patel (57 y.o. male)  Date: 7/24/2022  Primary Diagnosis: CVA (cerebral vascular accident) [facility]) [I63.9]       Precautions:   Fall    ASSESSMENT :  Based on the objective data described below, the patient presents with functional oropharyngeal phases of swallow with no overt difficulty nor overt /ss of aspiration. Recommend pt continue baseline diet. No further swallowing needs. Additionally, pt presents with a mild dysarthria characterized by labial phoneme imprecision 2/2 R sided labial weakness likely as a result of L corona radiata stroke. Will follow for motor speech intervention. Patient will benefit from skilled intervention to address the above impairments. Patients rehabilitation potential is considered to be Good     PLAN :  Recommendations and Planned Interventions:  --regular diet/thin liquids  --good oral care  --upright during meals  --motor speech treatment    Frequency/Duration: Patient will be followed by speech-language pathology 2 times a week to address goals. Discharge Recommendations: To Be Determined     SUBJECTIVE:   Patient stated, \"Yeah it's gotten better but still not back to normal.     OBJECTIVE:     Past Medical History:   Diagnosis Date    Epilepsy [facility])      Past Surgical History:   Procedure Laterality Date    NEUROLOGICAL PROCEDURE UNLISTED       Prior Level of Function/Home Situation:   Home Situation  Home Environment: Private residence  # Steps to Enter: 4  Rails to Enter: Yes  One/Two Story Residence: Two story  Living Alone: No  Support Systems: Spouse/Significant Other  Patient Expects to be Discharged to[de-identified] Home (TBD, pending therapy evals)  Current DME Used/Available at Home: None  Tub or Shower Type: Shower  Diet prior to admission: regular diet/thin liquids  Current Diet:  regular diet/thin liquids   Cognitive and Communication Status:  Neurologic State: Alert  Orientation Level: Oriented X4  Cognition: Follows commands  Perception: Appears intact  Perseveration: No perseveration noted  Safety/Judgement: Awareness of environment, Insight into deficits  Swallowing Evaluation:   Oral Assessment:  Oral Assessment  Labial: Decreased seal;Right droop  Dentition: Natural  Oral Hygiene: oral mucosa moist and clear of secretions  Lingual: No impairment  Velum: No impairment  Mandible: No impairment  P.O. Trials:  Patient Position: upright  Vocal quality prior to P.O.: No impairment  Consistency Presented: Thin liquid; Solid  How Presented: Self-fed/presented;Spoon;Straw;Successive swallows     Bolus Acceptance: No impairment  Bolus Formation/Control: No impairment     Propulsion: No impairment  Oral Residue: None  Initiation of Swallow: No impairment  Laryngeal Elevation: Functional  Aspiration Signs/Symptoms: None  Pharyngeal Phase Characteristics: No impairment, issues, or problems              Oral Phase Severity: No impairment  Pharyngeal Phase Severity : No impairment    NOMS:   The NOMS functional outcome measure was used to quantify this patient's level of swallowing impairment. Based on the NOMS, the patient was determined to be at level 7 for swallow function       NOMS Swallowing Levels:  Level 1 (CN): NPO  Level 2 (CM): NPO but takes consistency in therapy  Level 3 (CL): Takes less than 50% of nutrition p.o. and continues with nonoral feedings; and/or safe with mod cues; and/or max diet restriction  Level 4 (CK):  Safe swallow but needs mod cues; and/or mod diet restriction; and/or still requires some nonoral feeding/supplements  Level 5 (CJ): Safe swallow with min diet restriction; and/or needs min cues  Level 6 (CI): Independent with p.o.; rare cues; usually self cues; may need to avoid some foods or needs extra time  Level 7 ([address] [place] [address]): Independent for all p.o.  ALBERTO (2003). National Outcomes Measurement System (NOMS): Adult Speech-Language Pathology User's Guide. Speech/Language Evaluation  Motor Speech:  Oral-Motor Structure/Motor Speech  Labial: Decreased seal;Right droop  Dentition: Natural  Oral Hygiene: oral mucosa moist and clear of secretions  Lingual: No impairment  Velum: No impairment  Mandible: No impairment  Dysarthric Characteristics: Blended word boundaries; Imprecise  Intelligibility: No impairment  Overall Impairment Severity: Minimal        NOMS:   The NOMS functional outcome measure was used to quantify this patient's level of motor speech impairment. Based on the NOMS, the patient was determined to be at level 6 for motor speech function. NOMS Motor Speech:  Level 1 (CN):  100% unintelligible  Level 2 (CM):  Communication partner responsible for message; can do CV or automatic words w/ max cues but rarely intelligible in context  Level 3 (CL): communication partner primarily responsible for message but says CV/automatic words intelligibly; mod cues to say simple words/phrases  Level 4 (CK): In structured conversation with familiar listener can say simple words and phrases. Mod cues for simple sentences  Level 5 (CJ):  Uses simple sentences for ADLs with familiar and unfamiliar listener; min cues for complex sentences  Level 6 (CI):  Intelligible in ADLs; difficulty in voc/social activites; rare cues for complex message; uses comp strategies  Level 7 ([address] [place] [address]):  Intelligible in all activities. May occasionally use compensatory strategies. ALBERTO (2003). National Outcomes Measurement System (NOMS): Adult Speech-Language Pathology User's Guide.          Pain:   0/10          After treatment:   Call bell within reach and Nursing notified    COMMUNICATION/EDUCATION:       The patient's plan of care including recommendations, planned interventions, and recommended diet changes were discussed with: Registered nurse. Patient/family have participated as able in goal setting and plan of care.     Thank you for this referral.  ANTONIA Arreola  Time Calculation: 15 mins

## 2022-07-24 NOTE — PROGRESS NOTES
[facility] Hospitalist Service Progress Note    INTERVAL HISTORY  / Subjective: Worsening neurological assessment noted by his bedside nurse doing neurological checks today, code stroke was then activated, telemetry neurology had evaluated the patient, eventually MRI did confirm a small   Acute stroke    Assessment / Plan:      ACute CVA with right-sided hemiparesis  --07/23 Small focal acute infarct at the left corona radiata. Left-sided craniotomy changes and underlying encephalomalacia/postsurgical  change. -- Consulted, he has been started on aspirin, Plavix, high intensity statin, and Keppra for concern for seizure-like activity -- ETA did not show any large vessel occlusion, EEG pending, 2D echo pending      Traumatic brain injury  - Subdural hemorrhage requiring left-sided craniotomy, with underlying encephalomalacia          Chief Complaint : Cerebrovascular Accident        Objective:  Visit Vitals  BP (!) (P) 145/86   Pulse 66   Temp (P) 98 °F (36.7 °C)   Resp (P) 16   Ht 5' 8\" (1.727 m)   Wt 86.2 kg (190 lb)   SpO2 97%   BMI 28.89 kg/m²                 Physical Exam:  General: No acute distress, speaking in full sentences, no use of accessory muscles   HEENT: Pupils equal and reactive to light and accommodation, oropharynx is clear   Neck: Supple, no lymphadenopathy, no JVD   Lungs: Clear to auscultation bilaterally   Cardiovascular: Regular rate and rhythm with normal S1 and S2   Abdomen: Soft, nontender, nondistended, normoactive bowel sounds   Extremities: No cyanosis clubbing or edema   Neuro: Facial droop, right upper and lower extremity weakness , A&O x3   Psych: Normal affect     Intake and Output:  Date 07/22/22 1900 - 07/23/22 0659 07/23/22 0700 - 07/24/22 0659   Shift 2112-0816 24 Hour Total 6763-5079 2632-6023 24 Hour Total   INTAKE   Shift Total(mL/kg)        OUTPUT   Urine(mL/kg/hr) 450 450        Urine Voided 450 450      Shift Total(mL/kg) 450(5.2) 450(5.2)      NET -450 -450      Weight (kg) 86.2 86.2 86.2 86.2 86.2       LAB:  Admission on 07/22/2022   Component Date Value    Ventricular Rate 07/22/2022 52     Atrial Rate 07/22/2022 52     P-R Interval 07/22/2022 168     QRS Duration 07/22/2022 100     Q-T Interval 07/22/2022 440     QTC Calculation (Bezet) 07/22/2022 409     Calculated P Axis 07/22/2022 48     Calculated R Axis 07/22/2022 50     Calculated T Axis 07/22/2022 2     Diagnosis 07/22/2022                      Value:Sinus bradycardia  Minimal voltage criteria for LVH, may be normal variant ( Sokolow-Downing )  Nonspecific T wave abnormality  Abnormal ECG  When compared with ECG of 31-OCT-2019 19:06,  Nonspecific T wave abnormality now evident in Lateral leads  Confirmed by Collie Adjutant ([id number]) on 7/23/2022 3:01:03 PM      WBC 07/22/2022 5.7     RBC 07/22/2022 4.32     HGB 07/22/2022 14.5     HCT 07/22/2022 42.3     MCV 07/22/2022 97.9     MCH 07/22/2022 33.6     MCHC 07/22/2022 34.3     RDW 07/22/2022 12.3     PLATELET 57/05/6267 427     MPV 07/22/2022 9.3     NRBC 07/22/2022 0.0     ABSOLUTE NRBC 07/22/2022 0.00     NEUTROPHILS 07/22/2022 42     LYMPHOCYTES 07/22/2022 45     MONOCYTES 07/22/2022 11     EOSINOPHILS 07/22/2022 1     BASOPHILS 07/22/2022 1     IMMATURE GRANULOCYTES 07/22/2022 0     ABS. NEUTROPHILS 07/22/2022 2.4     ABS. LYMPHOCYTES 07/22/2022 2.5     ABS. MONOCYTES 07/22/2022 0.7     ABS. EOSINOPHILS 07/22/2022 0.1     ABS. BASOPHILS 07/22/2022 0.1     ABS. IMM. GRANS. 07/22/2022 0.0     DF 07/22/2022 AUTOMATED     Sodium 07/22/2022 139     Potassium 07/22/2022 3.8     Chloride 07/22/2022 107     CO2 07/22/2022 24     Anion gap 07/22/2022 8     Glucose 07/22/2022 77     BUN 07/22/2022 13     Creatinine 07/22/2022 0.94     BUN/Creatinine ratio 07/22/2022 14     GFR est AA 07/22/2022 >60     GFR est non-AA 07/22/2022 >60     Calcium 07/22/2022 9.3     Bilirubin, total 07/22/2022 0.5     ALT (SGPT) 07/22/2022 33     AST (SGOT) 07/22/2022 22     Alk.  phosphatase 07/22/2022 68 Protein, total 07/22/2022 8.0     Albumin 07/22/2022 4.0     Globulin 07/22/2022 4.0     A-G Ratio 07/22/2022 1.0 (A)    INR 07/22/2022 1.1     Prothrombin time 07/22/2022 11.8 (A)    Troponin-High Sensitivity 07/22/2022 4     Glucose (POC) 07/22/2022 73     Performed by 07/22/2022 Audery Lacie     ALCOHOL(ETHYL),SERUM 07/22/2022 90 (A)    Magnesium 07/22/2022 2.7 (A)    Phosphorus 07/22/2022 3.8     AMPHETAMINES 07/22/2022 Negative     BARBITURATES 07/22/2022 Negative     BENZODIAZEPINES 07/22/2022 Negative     COCAINE 07/22/2022 Negative     METHADONE 07/22/2022 Negative     OPIATES 07/22/2022 Negative     PCP(PHENCYCLIDINE) 07/22/2022 Negative     THC (TH-CANNABINOL) 07/22/2022 Negative     Drug screen comment 07/22/2022 (NOTE)     Ammonia, plasma 07/22/2022 23     Color 07/22/2022 YELLOW/STRAW     Appearance 07/22/2022 CLEAR     pH (UA) 07/22/2022 5.5     Protein 07/22/2022 Negative     Glucose 07/22/2022 Negative     Ketone 07/22/2022 Negative     Bilirubin 07/22/2022 Negative     Blood 07/22/2022 Negative     Urobilinogen 07/22/2022 0.2     Nitrites 07/22/2022 Negative     Leukocyte Esterase 07/22/2022 Negative     WBC 07/22/2022 0-4     RBC 07/22/2022 0-5     Epithelial cells 07/22/2022 FEW     Bacteria 07/22/2022 Negative     UA:UC IF INDICATED 07/22/2022 CULTURE NOT INDICATED BY UA RESULT     Vitamin B12 07/22/2022 277     TSH 07/22/2022 2.29     SAMPLES BEING HELD 07/22/2022 1PST     COMMENT 07/22/2022 Add-on orders for these samples will be processed based on acceptable specimen integrity and analyte stability, which may vary by analyte.      Cholesterol, total 07/23/2022 142     Triglyceride 07/23/2022 49     HDL Cholesterol 07/23/2022 55     LDL, calculated 07/23/2022 77.2     VLDL, calculated 07/23/2022 9.8     CHOL/HDL Ratio 07/23/2022 2.6     Hemoglobin A1c 07/23/2022 5.5     Est. average glucose 07/23/2022 111     WBC 07/23/2022 6.1     RBC 07/23/2022 3.95 (A)    HGB 07/23/2022 13.3     HCT 07/23/2022 39.3     MCV 07/23/2022 99.5 (A)    MCH 07/23/2022 33.7     MCHC 07/23/2022 33.8     RDW 07/23/2022 12.4     PLATELET 73/92/8570 638     MPV 07/23/2022 9.5     NRBC 07/23/2022 0.0     ABSOLUTE NRBC 07/23/2022 0.00     Magnesium 07/23/2022 2.6 (A)    Sodium 07/23/2022 139     Potassium 07/23/2022 4.4     Chloride 07/23/2022 109 (A)    CO2 07/23/2022 28     Anion gap 07/23/2022 2 (A)    Glucose 07/23/2022 76     BUN 07/23/2022 14     Creatinine 07/23/2022 0.97     BUN/Creatinine ratio 07/23/2022 14     GFR est AA 07/23/2022 >60     GFR est non-AA 07/23/2022 >60     Calcium 07/23/2022 8.8     Bilirubin, total 07/23/2022 0.6     ALT (SGPT) 07/23/2022 31     AST (SGOT) 07/23/2022 22     Alk. phosphatase 07/23/2022 63     Protein, total 07/23/2022 7.4     Albumin 07/23/2022 3.4 (A)    Globulin 07/23/2022 4.0     A-G Ratio 07/23/2022 0.9 (A)    SAMPLES BEING HELD 07/23/2022 1RED     COMMENT 07/23/2022 Add-on orders for these samples will be processed based on acceptable specimen integrity and analyte stability, which may vary by analyte. Glucose (POC) 07/23/2022 109     Performed by 07/23/2022 Rukhsana Quest        IMAGING:  XR CHEST PORT    Result Date: 7/22/2022  No acute cardiopulmonary process. CTA CODE NEURO HEAD AND NECK W CONT    Result Date: 7/23/2022  Stable exam without evidence for acute large vessel arterial occlusion. No significant cerebral perfusion abnormality or mismatch. CTA CODE NEURO HEAD AND NECK W CONT    Result Date: 7/22/2022  1. No large vessel occlusion or hemodynamically significant carotid stenosis. 2.  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 3.  No perfusion abnormality. CT CODE NEURO HEAD WO CONTRAST    Result Date: 7/23/2022  1. No intracranial hemorrhage. 2. Subtle CT evidence of an evolving acute left corona radiata infarct, in correlation with recent prior MRI. 3. No other significant change.     CT CODE NEURO HEAD WO CONTRAST    Result Date: 7/22/2022  No significant interval change. No evidence of acute process. CT CODE NEURO PERF W CBF    Result Date: 7/23/2022  Stable exam without evidence for acute large vessel arterial occlusion. No significant cerebral perfusion abnormality or mismatch. CT CODE NEURO PERF W CBF    Result Date: 7/22/2022  1. No large vessel occlusion or hemodynamically significant carotid stenosis. 2.  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 3.  No perfusion abnormality. EKG:  No results found for this or any previous visit.           Ledy Echavarria MD  7/23/2022 8:41 PM

## 2022-07-24 NOTE — PROGRESS NOTES
Transition of Care: TBD, likely home with wife, pending progress with therapy. Patient was independent prior to admission, and is employed at a . Care Management Interventions  PCP Verified by CM: Yes (Dr. Suraj Oliveros)  Mode of Transport at Discharge: Other (see comment) (TBD pending therapy evals)  Transition of Care Consult (CM Consult): Discharge Planning  MyChart Signup: No  Discharge Durable Medical Equipment: No  Physical Therapy Consult: Yes  Occupational Therapy Consult: Yes  Speech Therapy Consult: Yes  Support Systems: Spouse/Significant Other  Confirm Follow Up Transport: Other (see comment) (TBD, pending therapy evals)  The Patient and/or Patient Representative was Provided with a Choice of Provider and Agrees with the Discharge Plan?: Yes  Freedom of Choice List was Provided with Basic Dialogue that Supports the Patient's Individualized Plan of Care/Goals, Treatment Preferences and Shares the Quality Data Associated with the Providers?: Yes  Discharge Location  Patient Expects to be Discharged to[de-identified] Home (TBD, pending therapy evals)     Reason for Admission:  Acute CVA with right-sided hemiparesis                     RUR Score:     6%                Plan for utilizing home health:     TBD. No home care orders at this time. PCP: First and Last name:  None Patient's wife stated that PCP is Suraj Oliveros, however unsure when patient last saw PCP. Name of Practice:    Are you a current patient: Yes/No: yes   Approximate date of last visit: unsure of last visit.     Can you participate in a virtual visit with your PCP:                     Current Advanced Directive/Advance Care Plan: Full Code      Healthcare Decision Maker:   Click here to complete [id number] including selection of the Healthcare Decision Maker Relationship (ie \"Primary\")             Primary Decision Maker: AddyAstrid - Spouse - [phone]                  Transition of Care Plan:    TBD, pending PT/OT evals. Chart reviewed. CM met with patient and wife at bedside. Patient appeared alert and oriented, however wife answered assessment questions. Confirmed demographics on face sheet. Patient lives with his wife in a two story home with 4 stairs to enter. Patient is usually independent and does not use DME. Patient is employed as a . Patient gets prescriptions filled at [facility] on [facility]. Wife stated that patient's PCP is Dr. Mary Owen, however unsure when he last saw PCP, but stated it has been awhile. Care management will continue to follow for therapy evaluations and discharge planning.     FEDERICO Tavarez/PRAFUL

## 2022-07-24 NOTE — PROGRESS NOTES
Problem: Falls - Risk of  Goal: *Absence of Falls  Description: Document Danne Lobe Fall Risk and appropriate interventions in the flowsheet.   Outcome: Progressing Towards Goal  Note: Fall Risk Interventions:  Mobility Interventions: Bed/chair exit alarm, Patient to call before getting OOB              Elimination Interventions: Bed/chair exit alarm, Toileting schedule/hourly rounds              Problem: Patient Education: Go to Patient Education Activity  Goal: Patient/Family Education  Outcome: Progressing Towards Goal     Problem: TIA/CVA Stroke: Day 2 Until Discharge  Goal: Activity/Safety  Outcome: Progressing Towards Goal  Goal: Diagnostic Test/Procedures  Outcome: Progressing Towards Goal  Goal: Nutrition/Diet  Outcome: Progressing Towards Goal  Goal: Discharge Planning  Outcome: Progressing Towards Goal  Goal: Medications  Outcome: Progressing Towards Goal  Goal: Respiratory  Outcome: Progressing Towards Goal  Goal: Treatments/Interventions/Procedures  Outcome: Progressing Towards Goal  Goal: Psychosocial  Outcome: Progressing Towards Goal  Goal: *Verbalizes anxiety and depression are reduced or absent  Outcome: Progressing Towards Goal  Goal: *Absence of aspiration  Outcome: Progressing Towards Goal  Goal: *Absence of deep venous thrombosis signs and symptoms(Stroke Metric)  Outcome: Progressing Towards Goal  Goal: *Optimal pain control at patient's stated goal  Outcome: Progressing Towards Goal  Goal: *Tolerating diet  Outcome: Progressing Towards Goal  Goal: *Ability to perform ADLs and demonstrates progressive mobility and function  Outcome: Progressing Towards Goal  Goal: *Stroke education continued(Stroke Metric)  Outcome: Progressing Towards Goal

## 2022-07-24 NOTE — PROGRESS NOTES
Around 0200, pt complained of R arm pain that was new. Pt alert to person and place, but disoriented to time (month/yr) and situation. Pt found to have increased right extremity weakness. Notified charge RN. Charge RN came to bedside to assess pt and performed NIH stroke score. Pt NIH stroke score increased to 8, from a previous score of 4. Providers Yamil Leahy and Nitesh Marcus notified of pt compliant of pain and change of neuro status. Both providers came to bedside to assess pt. Provider Jin ordered STAT CT of head which showed no significant change,1L bolus of fluid, melatonin for sleep, and lidocaine patch. Provider Sherry Dennison ordered xray of pts right arm which showed no acute abnormality. Pt received ordered meds along with po tylenol for pain. Upon reassessment at [phone], pt was able to verbalize that he came into the hospital due to having a stroke. Pt currently alert and oriented to person, place, and situation and disoriented to time.

## 2022-07-24 NOTE — PROGRESS NOTES
Charge nurse notified neuro critical care NP patient was exhibiting worsening neuro deficits associated with pain and was also c/o not being able to sleep. Upon examiner evaluation, patient c/o RUE pain, appeared more alert compared to yesterday during initial code S alert however, was noted to be having worsening facial droop, slurred speech, and RUE weakness. Still disoriented to mos/yr. SBP was 150's and BS was 130. Patient was able to protect airway on RA 95%. STAt CTH was repeated which showed no significant change. CTP showed no significant perfusion defect except for hypoperfusion. No seizure episodes noted during examiner evaluation. Patient was given 1L NS bolus and was given tylenol for RUE pain. Also ordered lidocaine patch for RUE and melatonin for sleep as patient also c/o insomnia. MRI Brain obtained 07/23/22 preliminary showed small focal acute infarct at the left corona radiata. Left-sided craniotomy changes and underlying encephalomalacia/postsurgical change. Patient has being started on Aspirin 81 mg and Plavix 75 mg daily as well as statin. Pending aru and pru assays. Off-note, code S was alerted yesterday afternoon for similar symptoms with worsening right sided weakness and NIHSS was 2. CTH showed subtle CT evidence of an evolving acute left corona radiata infarct, in correlation with recent prior MRI. CTA negative for LVO. Patient was started on his keppra 500 mg BID. Case dw hospitalist NP who will be obtaining x-ray of RUE.

## 2022-07-24 NOTE — PROGRESS NOTES
[facility]    INTERVAL HISTORY  / Subjective: Worsening neurological assessment noted by his bedside nurse doing neurological checks today, code stroke was then activated, telemetry neurology had evaluated the patient, eventually MRI did confirm a small   Acute stroke    Assessment / Plan:      ACute CVA with right-sided hemiparesis  --07/23 Small focal acute infarct at the left corona radiata. Left-sided craniotomy changes and underlying encephalomalacia/postsurgical  change. -- Consulted, he has been started on aspirin, Plavix, high intensity statin, and Keppra for concern for seizure-like activity -- ETA did not show any large vessel occlusion, EEG pending, 2D echo pending  --07/24 ARU, PRU with expected antiplatlet effect, resumed on ASA  Plavix, statin , and keppra, ECHO pending     Traumatic brain injury  - Subdural hemorrhage requiring left-sided craniotomy, with underlying encephalomalacia          Chief Complaint : Cerebrovascular Accident        Objective:  Visit Vitals  /79 (BP 1 Location: Left upper arm, BP Patient Position: At rest)   Pulse (!) 54   Temp 97.9 °F (36.6 °C)   Resp 19   Ht 5' 8\" (1.727 m)   Wt 86.2 kg (190 lb)   SpO2 95%   BMI 28.89 kg/m²                 Physical Exam:  General: No acute distress, speaking in full sentences, no use of accessory muscles   HEENT: Pupils equal and reactive to light and accommodation, oropharynx is clear   Neck: Supple, no lymphadenopathy, no JVD   Lungs: Clear to auscultation bilaterally   Cardiovascular: Regular rate and rhythm with normal S1 and S2   Abdomen: Soft, nontender, nondistended, normoactive bowel sounds   Extremities: No cyanosis clubbing or edema   Neuro: Facial droop, right upper and lower extremity weakness , A&O x3   Psych: Normal affect     Intake and Output:  Date 07/23/22 1900 - 07/24/22 0659 07/24/22 0700 - 07/25/22 0659   Shift 7234-3613 24 Hour Total 8126-2414 2947-1954 24 Hour Total   INTAKE   P.O. 840  840     P. O.   840  840   Shift Total(mL/kg)   840(9.7)  840(9.7)   OUTPUT   Urine(mL/kg/hr) 1750 1750        Urine Voided 1750 1750        Urine Occurrence(s)   4 x  4 x   Shift Total(mL/kg) 1750(20.3) 1750(20.3)      NET -1750 -1750 840  840   Weight (kg) 86.2 86.2 86.2 86.2 86.2         LAB:  Admission on 07/22/2022   Component Date Value    Ventricular Rate 07/22/2022 52     Atrial Rate 07/22/2022 52     P-R Interval 07/22/2022 168     QRS Duration 07/22/2022 100     Q-T Interval 07/22/2022 440     QTC Calculation (Bezet) 07/22/2022 409     Calculated P Axis 07/22/2022 48     Calculated R Axis 07/22/2022 50     Calculated T Axis 07/22/2022 2     Diagnosis 07/22/2022                      Value:Sinus bradycardia  Minimal voltage criteria for LVH, may be normal variant ( Sokolow-Downing )  Nonspecific T wave abnormality  Abnormal ECG  When compared with ECG of 31-OCT-2019 19:06,  Nonspecific T wave abnormality now evident in Lateral leads  Confirmed by Stephon Mariano ([id number]) on 7/23/2022 3:01:03 PM      WBC 07/22/2022 5.7     RBC 07/22/2022 4.32     HGB 07/22/2022 14.5     HCT 07/22/2022 42.3     MCV 07/22/2022 97.9     MCH 07/22/2022 33.6     MCHC 07/22/2022 34.3     RDW 07/22/2022 12.3     PLATELET 63/89/4537 679     MPV 07/22/2022 9.3     NRBC 07/22/2022 0.0     ABSOLUTE NRBC 07/22/2022 0.00     NEUTROPHILS 07/22/2022 42     LYMPHOCYTES 07/22/2022 45     MONOCYTES 07/22/2022 11     EOSINOPHILS 07/22/2022 1     BASOPHILS 07/22/2022 1     IMMATURE GRANULOCYTES 07/22/2022 0     ABS. NEUTROPHILS 07/22/2022 2.4     ABS. LYMPHOCYTES 07/22/2022 2.5     ABS. MONOCYTES 07/22/2022 0.7     ABS. EOSINOPHILS 07/22/2022 0.1     ABS. BASOPHILS 07/22/2022 0.1     ABS. IMM.  GRANS. 07/22/2022 0.0     DF 07/22/2022 AUTOMATED     Sodium 07/22/2022 139     Potassium 07/22/2022 3.8     Chloride 07/22/2022 107     CO2 07/22/2022 24     Anion gap 07/22/2022 8     Glucose 07/22/2022 77     BUN 07/22/2022 13     Creatinine 07/22/2022 0. 94     BUN/Creatinine ratio 07/22/2022 14     GFR est AA 07/22/2022 >60     GFR est non-AA 07/22/2022 >60     Calcium 07/22/2022 9.3     Bilirubin, total 07/22/2022 0.5     ALT (SGPT) 07/22/2022 33     AST (SGOT) 07/22/2022 22     Alk. phosphatase 07/22/2022 68     Protein, total 07/22/2022 8.0     Albumin 07/22/2022 4.0     Globulin 07/22/2022 4.0     A-G Ratio 07/22/2022 1.0 (A)    INR 07/22/2022 1.1     Prothrombin time 07/22/2022 11.8 (A)    Troponin-High Sensitivity 07/22/2022 4     Glucose (POC) 07/22/2022 73     Performed by 07/22/2022 Ami Eri     ALCOHOL(ETHYL),SERUM 07/22/2022 90 (A)    Magnesium 07/22/2022 2.7 (A)    Phosphorus 07/22/2022 3.8     AMPHETAMINES 07/22/2022 Negative     BARBITURATES 07/22/2022 Negative     BENZODIAZEPINES 07/22/2022 Negative     COCAINE 07/22/2022 Negative     METHADONE 07/22/2022 Negative     OPIATES 07/22/2022 Negative     PCP(PHENCYCLIDINE) 07/22/2022 Negative     THC (TH-CANNABINOL) 07/22/2022 Negative     Drug screen comment 07/22/2022 (NOTE)     Ammonia, plasma 07/22/2022 23     Color 07/22/2022 YELLOW/STRAW     Appearance 07/22/2022 CLEAR     pH (UA) 07/22/2022 5.5     Protein 07/22/2022 Negative     Glucose 07/22/2022 Negative     Ketone 07/22/2022 Negative     Bilirubin 07/22/2022 Negative     Blood 07/22/2022 Negative     Urobilinogen 07/22/2022 0.2     Nitrites 07/22/2022 Negative     Leukocyte Esterase 07/22/2022 Negative     WBC 07/22/2022 0-4     RBC 07/22/2022 0-5     Epithelial cells 07/22/2022 FEW     Bacteria 07/22/2022 Negative     UA:UC IF INDICATED 07/22/2022 CULTURE NOT INDICATED BY UA RESULT     Vitamin B12 07/22/2022 277     TSH 07/22/2022 2.29     SAMPLES BEING HELD 07/22/2022 1PST     COMMENT 07/22/2022 Add-on orders for these samples will be processed based on acceptable specimen integrity and analyte stability, which may vary by analyte.      Cholesterol, total 07/23/2022 142     Triglyceride 07/23/2022 49     HDL Cholesterol 07/23/2022 55 LDL, calculated 07/23/2022 77.2     VLDL, calculated 07/23/2022 9.8     CHOL/HDL Ratio 07/23/2022 2.6     Hemoglobin A1c 07/23/2022 5.5     Est. average glucose 07/23/2022 111     WBC 07/23/2022 6.1     RBC 07/23/2022 3.95 (A)    HGB 07/23/2022 13.3     HCT 07/23/2022 39.3     MCV 07/23/2022 99.5 (A)    MCH 07/23/2022 33.7     MCHC 07/23/2022 33.8     RDW 07/23/2022 12.4     PLATELET 79/63/0760 902     MPV 07/23/2022 9.5     NRBC 07/23/2022 0.0     ABSOLUTE NRBC 07/23/2022 0.00     Magnesium 07/23/2022 2.6 (A)    Sodium 07/23/2022 139     Potassium 07/23/2022 4.4     Chloride 07/23/2022 109 (A)    CO2 07/23/2022 28     Anion gap 07/23/2022 2 (A)    Glucose 07/23/2022 76     BUN 07/23/2022 14     Creatinine 07/23/2022 0.97     BUN/Creatinine ratio 07/23/2022 14     GFR est AA 07/23/2022 >60     GFR est non-AA 07/23/2022 >60     Calcium 07/23/2022 8.8     Bilirubin, total 07/23/2022 0.6     ALT (SGPT) 07/23/2022 31     AST (SGOT) 07/23/2022 22     Alk. phosphatase 07/23/2022 63     Protein, total 07/23/2022 7.4     Albumin 07/23/2022 3.4 (A)    Globulin 07/23/2022 4.0     A-G Ratio 07/23/2022 0.9 (A)    SAMPLES BEING HELD 07/23/2022 1RED     COMMENT 07/23/2022 Add-on orders for these samples will be processed based on acceptable specimen integrity and analyte stability, which may vary by analyte. Glucose (POC) 07/23/2022 109     Performed by 07/23/2022 Lonza Stallion     Glucose (POC) 07/24/2022 126 (A)    Performed by 07/24/2022 PETER Rodgers     Aspirin test 07/24/2022 387     P2Y12 Plt response 07/24/2022 191 (A)       IMAGING:  XR SHOULDER RT AP/LAT MIN 2 V    Result Date: 7/24/2022  No acute abnormality. CT HEAD WO CONT    Result Date: 7/24/2022  No significant interval change. XR CHEST PORT    Result Date: 7/22/2022  No acute cardiopulmonary process. CTA CODE NEURO HEAD AND NECK W CONT    Result Date: 7/23/2022  Stable exam without evidence for acute large vessel arterial occlusion.  No significant cerebral perfusion abnormality or mismatch. CTA CODE NEURO HEAD AND NECK W CONT    Result Date: 7/22/2022  1. No large vessel occlusion or hemodynamically significant carotid stenosis. 2.  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 3.  No perfusion abnormality. CT CODE NEURO HEAD WO CONTRAST    Result Date: 7/23/2022  1. No intracranial hemorrhage. 2. Subtle CT evidence of an evolving acute left corona radiata infarct, in correlation with recent prior MRI. 3. No other significant change. CT CODE NEURO HEAD WO CONTRAST    Result Date: 7/22/2022  No significant interval change. No evidence of acute process. CT CODE NEURO PERF W CBF    Result Date: 7/23/2022  Stable exam without evidence for acute large vessel arterial occlusion. No significant cerebral perfusion abnormality or mismatch. CT CODE NEURO PERF W CBF    Result Date: 7/22/2022  1. No large vessel occlusion or hemodynamically significant carotid stenosis. 2.  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 3.  No perfusion abnormality. EKG:  No results found for this or any previous visit.           Anita Abdi MD  7/24/2022 8:41 PM

## 2022-07-24 NOTE — PROGRESS NOTES
Problem: Falls - Risk of  Goal: *Absence of Falls  Description: Document Shukri Sol Fall Risk and appropriate interventions in the flowsheet.   Outcome: Progressing Towards Goal  Note: Fall Risk Interventions:  Mobility Interventions: Bed/chair exit alarm, OT consult for ADLs, Patient to call before getting OOB, PT Consult for mobility concerns, PT Consult for assist device competence         Medication Interventions: Bed/chair exit alarm, Patient to call before getting OOB, Teach patient to arise slowly    Elimination Interventions: Bed/chair exit alarm, Call light in reach, Patient to call for help with toileting needs, Stay With Me (per policy), Toilet paper/wipes in reach, Toileting schedule/hourly rounds, Urinal in reach

## 2022-07-25 ENCOUNTER — APPOINTMENT (OUTPATIENT)
Dept: NON INVASIVE DIAGNOSTICS | Age: 57
DRG: 065 | End: 2022-07-25
Attending: STUDENT IN AN ORGANIZED HEALTH CARE EDUCATION/TRAINING PROGRAM
Payer: COMMERCIAL

## 2022-07-25 LAB
ECHO AO ROOT DIAM: 3.5 CM
ECHO AO ROOT INDEX: 1.75 CM/M2
ECHO AV PEAK GRADIENT: 3 MMHG
ECHO AV PEAK VELOCITY: 0.9 M/S
ECHO AV VELOCITY RATIO: 0.89
ECHO EST RA PRESSURE: 3 MMHG
ECHO LA DIAMETER INDEX: 1.7 CM/M2
ECHO LA DIAMETER: 3.4 CM
ECHO LA TO AORTIC ROOT RATIO: 0.97
ECHO LA VOL 2C: 33 ML (ref 18–58)
ECHO LA VOL 4C: 29 ML (ref 18–58)
ECHO LA VOLUME AREA LENGTH: 31 ML
ECHO LA VOLUME INDEX A2C: 17 ML/M2 (ref 16–34)
ECHO LA VOLUME INDEX A4C: 15 ML/M2 (ref 16–34)
ECHO LA VOLUME INDEX AREA LENGTH: 16 ML/M2 (ref 16–34)
ECHO LV E' LATERAL VELOCITY: 11 CM/S
ECHO LV E' SEPTAL VELOCITY: 9 CM/S
ECHO LV FRACTIONAL SHORTENING: 30 % (ref 28–44)
ECHO LV INTERNAL DIMENSION DIASTOLE INDEX: 2 CM/M2
ECHO LV INTERNAL DIMENSION DIASTOLIC: 4 CM (ref 4.2–5.9)
ECHO LV INTERNAL DIMENSION SYSTOLIC INDEX: 1.4 CM/M2
ECHO LV INTERNAL DIMENSION SYSTOLIC: 2.8 CM
ECHO LV IVSD: 1.1 CM (ref 0.6–1)
ECHO LV MASS 2D: 145.6 G (ref 88–224)
ECHO LV MASS INDEX 2D: 72.8 G/M2 (ref 49–115)
ECHO LV POSTERIOR WALL DIASTOLIC: 1.1 CM (ref 0.6–1)
ECHO LV RELATIVE WALL THICKNESS RATIO: 0.55
ECHO LVOT PEAK GRADIENT: 3 MMHG
ECHO LVOT PEAK VELOCITY: 0.8 M/S
ECHO MV E VELOCITY: 0.58 M/S
ECHO MV E/E' LATERAL: 5.27
ECHO MV E/E' RATIO (AVERAGED): 5.86
ECHO MV E/E' SEPTAL: 6.44
ECHO MV REGURGITANT PEAK GRADIENT: 18 MMHG
ECHO MV REGURGITANT PEAK VELOCITY: 2.1 M/S
ECHO PULMONARY ARTERY END DIASTOLIC PRESSURE: 18 MMHG
ECHO PV MAX VELOCITY: 1 M/S
ECHO PV PEAK GRADIENT: 4 MMHG
ECHO RIGHT VENTRICULAR SYSTOLIC PRESSURE (RVSP): 22 MMHG
ECHO RV FREE WALL PEAK S': 12 CM/S
ECHO RV TAPSE: 1.9 CM (ref 1.7–?)
ECHO TV REGURGITANT MAX VELOCITY: 2.19 M/S
ECHO TV REGURGITANT PEAK GRADIENT: 19 MMHG

## 2022-07-25 PROCEDURE — 74011250637 HC RX REV CODE- 250/637: Performed by: NURSE PRACTITIONER

## 2022-07-25 PROCEDURE — 93306 TTE W/DOPPLER COMPLETE: CPT | Performed by: SPECIALIST

## 2022-07-25 PROCEDURE — 74011000250 HC RX REV CODE- 250: Performed by: NURSE PRACTITIONER

## 2022-07-25 PROCEDURE — 93306 TTE W/DOPPLER COMPLETE: CPT

## 2022-07-25 PROCEDURE — 65270000046 HC RM TELEMETRY

## 2022-07-25 PROCEDURE — 74011250637 HC RX REV CODE- 250/637: Performed by: PSYCHIATRY & NEUROLOGY

## 2022-07-25 PROCEDURE — 92507 TX SP LANG VOICE COMM INDIV: CPT | Performed by: SPEECH-LANGUAGE PATHOLOGIST

## 2022-07-25 PROCEDURE — 74011250637 HC RX REV CODE- 250/637: Performed by: STUDENT IN AN ORGANIZED HEALTH CARE EDUCATION/TRAINING PROGRAM

## 2022-07-25 PROCEDURE — 97112 NEUROMUSCULAR REEDUCATION: CPT

## 2022-07-25 PROCEDURE — 97535 SELF CARE MNGMENT TRAINING: CPT

## 2022-07-25 PROCEDURE — 97164 PT RE-EVAL EST PLAN CARE: CPT

## 2022-07-25 PROCEDURE — 97530 THERAPEUTIC ACTIVITIES: CPT

## 2022-07-25 PROCEDURE — 99232 SBSQ HOSP IP/OBS MODERATE 35: CPT | Performed by: NURSE PRACTITIONER

## 2022-07-25 PROCEDURE — 97165 OT EVAL LOW COMPLEX 30 MIN: CPT

## 2022-07-25 PROCEDURE — 97537 COMMUNITY/WORK REINTEGRATION: CPT

## 2022-07-25 RX ADMIN — CYANOCOBALAMIN TAB 500 MCG 500 MCG: 500 TAB at 08:08

## 2022-07-25 RX ADMIN — ASPIRIN 81 MG CHEWABLE TABLET 81 MG: 81 TABLET CHEWABLE at 08:08

## 2022-07-25 RX ADMIN — ACETAMINOPHEN 650 MG: 325 TABLET ORAL at 08:08

## 2022-07-25 RX ADMIN — LEVETIRACETAM 500 MG: 500 TABLET, FILM COATED ORAL at 08:08

## 2022-07-25 RX ADMIN — CLOPIDOGREL BISULFATE 75 MG: 75 TABLET ORAL at 08:08

## 2022-07-25 RX ADMIN — ATORVASTATIN CALCIUM 40 MG: 40 TABLET, FILM COATED ORAL at 08:08

## 2022-07-25 RX ADMIN — CHLORDIAZEPOXIDE HYDROCHLORIDE 25 MG: 25 CAPSULE ORAL at 08:08

## 2022-07-25 NOTE — PROGRESS NOTES
Neurology Progress Note     NAME: Katie Richardson   :  1965   MRN:  [id number]   DATE:  2022    Assessment:     Active Problems:    Tobacco abuse (2012)      Alcohol abuse (2012)      Dysarthria (10/31/2019)      CVA (cerebral vascular accident) [facility]) (2022)    Patient is a 60year-old male with history of TBI and SDH s/p left craniotomy and seizures on Keppra who was admitted with dysarthria, aphasia and right sided weakness on 22. CTA showed right vertebral artery stenosis and MRI demonstrated small acute stroke in the left corona radiata. Patient was not a candidate for TNK or IA thrombectomy. He is s/p second Code Stroke yesterday due to worsening facial droop, slurred speech, and RUE weakness. CT head showed no acute change and CTP showed no significant perfusion deficit. Plan:   - Continue ASA 81 mg and Plavix 75 mg (assays therapeutic on both). Plavix started since patient already on ASA and had stroke with known vertebral artery stenosis. - continue atorvastatin 40 mg   - goal SBP <160  - TTE (): EF 55-60%, No PFO  - PT/OT consults, dispo pending  - discontinue Keppra as no clear seizures on presentation  - Please call Neurology with any further questions. Subjective:   Eager to work with therapy and get OOB. Wife states his R-side was stronger prior to yesterday.      Objective:   Chart reviewed since last seen    Current Facility-Administered Medications   Medication Dose Route Frequency    melatonin tablet 3 mg  3 mg Oral QHS PRN    lidocaine 4 % patch 1 Patch  1 Patch TransDERmal Q24H    diazePAM (VALIUM) injection 10 mg  10 mg IntraVENous Q3H PRN    cyanocobalamin (VITAMIN B12) tablet 500 mcg  500 mcg Oral DAILY    aspirin chewable tablet 81 mg  81 mg Oral DAILY    clopidogreL (PLAVIX) tablet 75 mg  75 mg Oral DAILY atorvastatin (LIPITOR) tablet 40 mg  40 mg Oral DAILY    hydrALAZINE (APRESOLINE) 20 mg/mL injection 10 mg  10 mg IntraVENous Q6H PRN    acetaminophen (TYLENOL) tablet 650 mg  650 mg Oral Q4H PRN    Or    acetaminophen (TYLENOL) solution 650 mg  650 mg Per NG tube Q4H PRN    Or    acetaminophen (TYLENOL) suppository 650 mg  650 mg Rectal Q4H PRN       Visit Vitals  /88   Pulse 68   Temp 97.3 °F (36.3 °C)   Resp 18   Ht 5' 8\" (1.727 m)   Wt 86.2 kg (190 lb 0.6 oz)   SpO2 96%   BMI 28.89 kg/m²     Temp (24hrs), Av.9 °F (36.6 °C), Min:97.3 °F (36.3 °C), Max:98.4 °F (36.9 °C)      701 - 1900  In: 360 [P.O.:360]  Out: -   1901 -  0700  In: 960 [P.O.:960]  Out: 8015 [Urine:2875]      Physical Exam:  General: Well developed well nourished patient in no apparent distress. Cardiac: Regular rate and rhythm with no murmurs. Extremities: 2+ Radial pulses, no cyanosis or edema    Neurological Exam:  Mental Status: Oriented to time, place and person. Mild dysarthria. Cranial Nerves:   Does not participate with full EOMs (patient does not fully bury to either side) but states full visual fields. Facial sensation is normal. Right facial droop. Right tongue deviation. Motor:  5/5 strength LUE/LLE. 4-/5 RUE/RLE. Normal bulk and tone. No PD.  No tremors   Reflexes: Not assessed   Sensory:   Intact to LT and PP   Gait:  Not assessed   Cerebellar:  Not assessed         Lab Review   Recent Results (from the past 24 hour(s))   ECHO ADULT COMPLETE    Collection Time: 22 12:00 PM   Result Value Ref Range    IVSd 1.1 (A) 0.6 - 1.0 cm    LVIDd 4.0 (A) 4.2 - 5.9 cm    LVIDs 2.8 cm    LVPWd 1.1 (A) 0.6 - 1.0 cm    LVOT Peak Gradient 3 mmHg    LVOT Peak Velocity 0.8 m/s    RVSP 22 mmHg    RV Free Wall Peak S' 12 cm/s    LA Diameter 3.4 cm    LA Volume A/L 31 mL    LA Volume 2C 33 18 - 58 mL    LA Volume 4C 29 18 - 58 mL    Est. RA Pressure 3 mmHg    AV Peak Gradient 3 mmHg    AV Peak Velocity 0.9 m/s    MV E Velocity 0.58 m/s    LV E' Lateral Velocity 11 cm/s    LV E' Septal Velocity 9 cm/s    MR Peak Gradient 18 mmHg    MR Peak Velocity 2.1 m/s    Pulmonary Artery EDP 18 mmHg    PV Peak Gradient 4 mmHg    PV Max Velocity 1.0 m/s    TAPSE 1.9 1.7 cm    TR Peak Gradient 19 mmHg    TR Max Velocity 2.19 m/s    Aortic Root 3.5 cm    Fractional Shortening 2D 30 28 - 44 %    LVIDd Index 2.00 cm/m2    LVIDs Index 1.40 cm/m2    LV RWT Ratio 0.55     LV Mass 2D 145.6 88 - 224 g    LV Mass 2D Index 72.8 49 - 115 g/m2    E/E' Ratio (Averaged) 5.86     E/E' Lateral 5.27     E/E' Septal 6.44     LA Volume Index A/L 16 16 - 34 mL/m2    LA Volume Index 2C 17 16 - 34 mL/m2    LA Volume Index 4C 15 (A) 16 - 34 mL/m2    LA Size Index 1.70 cm/m2    LA/AO Root Ratio 0.97     Ao Root Index 1.75 cm/m2    AV Velocity Ratio 0.89        Additional comments:  I have reviewed the patient's new clinical lab test results. I have personally reviewed the patient's radiographs. CT head (7/24/22): There is no  intracranial hemorrhage, extra-axial collection, or mass effect. The basilar  cisterns are open. No CT evidence of acute infarct. The bone windows demonstrate an unchanged left-sided craniotomy defect. CTA head and neck (7/23/22): Stable exam without evidence for acute large vessel arterial occlusion. No  significant cerebral perfusion abnormality or mismatch    MRI (7/23/22): Small focal acute infarct at the left corona radiata. Left-sided craniotomy changes and underlying encephalomalacia s/p remote craniotomy.         Care Plan discussed with:  Patient x   Family x   RN    Care Manager    Consultant/Specialist:  ORQUIDEA Flores

## 2022-07-25 NOTE — PROGRESS NOTES
Problem: Motor Speech Impaired (Adult)  Goal: *Acute Goals and Plan of Care (Insert Text)  Description: Speech Therapy Goals  Initiated 7/24/22    1. Patient will utilize compensatory strategies to improve articulatory precision within 7 days. Outcome: Progressing Towards Goal    SPEECH LANGUAGE PATHOLOGY TREATMENT  Patient: Apple Tomlinson (57 y.o. male)  Date: 7/25/2022  Diagnosis: [facility] [I63.9] <principal problem not specified>        ASSESSMENT:  Patient sitting up in chair. Speech at sentence level is slow, effortful, with low volume and imprecise articulation. Note patient frequently with whole word repetition throughout sentence but especially at beginning of sentences. Patient is able to communicate basic needs however demonstrates difficulty expressing complex thoughts and ideas. Patient and wife report patient is not as talkative as baseline. PLAN:  Patient continues to benefit from skilled intervention to address the above impairments. Continue treatment per established plan of care. Discharge Recommendations:  Inpatient Rehab     SUBJECTIVE:   Patient stated Alright. Patient's wife and friend at bedside. OBJECTIVE:   Mental Status:  Neurologic State: Alert  Orientation Level: Oriented to person, Oriented to place, Disoriented to time, Oriented to situation  Cognition: Follows commands  Perception: Cues to attend to right side of body, Cues to attend right visual field  Perseveration: No perseveration noted  Safety/Judgement: Awareness of environment, Insight into deficits  Treatment & Interventions: Motor Speech:     Intelligibility: Impaired  Word Intelligibility (%): 95 %     Sentence Intelligibility (%): 70 %              Dysarthric Characteristics: Blended word boundaries; Decreased breath support; Imprecise;Phonation/respiration incoordination     Language Comprehension and Expression:     Verbal Expression           After treatment:   Patient left in no apparent distress sitting up in chair, Call bell within reach, Nursing notified, and Caregiver / family present    COMMUNICATION/EDUCATION:   Patient was educated regarding role of SLP, speech intelligibility strategies and POC. The patient's plan of care including recommendations, planned interventions, and recommended diet changes were discussed with: Registered nurse.      Elijah Azul, SLP  Time Calculation: 13 mins

## 2022-07-25 NOTE — PROGRESS NOTES
[facility] Hospitalist Service Progress Note    INTERVAL HISTORY  / Subjective:    Assessment / Plan:  Has continued LUE Weakness > LLE Weakness, otherwise no complaints     ACute CVA with right-sided hemiparesis  --07/23 Small focal acute infarct at the left corona radiata. Left-sided craniotomy changes and underlying encephalomalacia/postsurgical  change. -- Consulted, he has been started on aspirin, Plavix, high intensity statin, and Keppra for concern for seizure-like activity -- ETA did not show any large vessel occlusion, EEG pending, 2D echo pending  --07/24 ARU, PRU with expected antiplatlet effect, resumed on ASA  Plavix, statin , and keppra, ECHO pending   --07/25 ECHO perfromed no LV Throbus or PFO, Normal EF, resume on ASA + Plavix + Atorvastatin,  will need to plan for IPR     Traumatic brain injury  - Subdural hemorrhage requiring left-sided craniotomy, with underlying encephalomalacia          Chief Complaint : Cerebrovascular Accident        Objective:  Visit Vitals  /88   Pulse 68   Temp 97.3 °F (36.3 °C)   Resp 18   Ht 5' 8\" (1.727 m)   Wt 86.2 kg (190 lb 0.6 oz)   SpO2 96%   BMI 28.89 kg/m²                 Physical Exam:  General: No acute distress, speaking in full sentences, no use of accessory muscles   HEENT: Pupils equal and reactive to light and accommodation, oropharynx is clear   Neck: Supple, no lymphadenopathy, no JVD   Lungs: Clear to auscultation bilaterally   Cardiovascular: Regular rate and rhythm with normal S1 and S2   Abdomen: Soft, nontender, nondistended, normoactive bowel sounds   Extremities: No cyanosis clubbing or edema   Neuro: Facial droop, right upper and lower extremity weakness , A&O x3   Psych: Normal affect     Intake and Output:  Date 07/24/22 0700 - 07/25/22 0659 07/25/22 0700 - 07/26/22 0659   Shift [id number] 1900-0659 24 Hour Total 4510-3133 4874-6640 24 Hour Total   INTAKE   P.O. 840 120 960 360  360     P. O. 840 120 960 360  360   Shift Total(mL/kg) 840(9.7) 120(1.4) 960(11.1) 360(4.2)  360(4.2)   OUTPUT   Urine(mL/kg/hr)  1125(1.1) 1125(0.5)        Urine Voided  1125 1125        Urine Occurrence(s) 4 x  4 x      Shift Total(mL/kg)  1125(13.1) 1125(13.1)       -1005 -165 360  360   Weight (kg) 86.2 86.2 86.2 86.2 86.2 86.2         LAB:  Admission on 07/22/2022   Component Date Value    Ventricular Rate 07/22/2022 52     Atrial Rate 07/22/2022 52     P-R Interval 07/22/2022 168     QRS Duration 07/22/2022 100     Q-T Interval 07/22/2022 440     QTC Calculation (Bezet) 07/22/2022 409     Calculated P Axis 07/22/2022 48     Calculated R Axis 07/22/2022 50     Calculated T Axis 07/22/2022 2     Diagnosis 07/22/2022                      Value:Sinus bradycardia  Minimal voltage criteria for LVH, may be normal variant ( Sokolow-Downing )  Nonspecific T wave abnormality  Abnormal ECG  When compared with ECG of 31-OCT-2019 19:06,  Nonspecific T wave abnormality now evident in Lateral leads  Confirmed by Stephanie Momin ([id number]) on 7/23/2022 3:01:03 PM      WBC 07/22/2022 5.7     RBC 07/22/2022 4.32     HGB 07/22/2022 14.5     HCT 07/22/2022 42.3     MCV 07/22/2022 97.9     MCH 07/22/2022 33.6     MCHC 07/22/2022 34.3     RDW 07/22/2022 12.3     PLATELET 23/74/9880 091     MPV 07/22/2022 9.3     NRBC 07/22/2022 0.0     ABSOLUTE NRBC 07/22/2022 0.00     NEUTROPHILS 07/22/2022 42     LYMPHOCYTES 07/22/2022 45     MONOCYTES 07/22/2022 11     EOSINOPHILS 07/22/2022 1     BASOPHILS 07/22/2022 1     IMMATURE GRANULOCYTES 07/22/2022 0     ABS. NEUTROPHILS 07/22/2022 2.4     ABS. LYMPHOCYTES 07/22/2022 2.5     ABS. MONOCYTES 07/22/2022 0.7     ABS. EOSINOPHILS 07/22/2022 0.1     ABS. BASOPHILS 07/22/2022 0.1     ABS. IMM.  GRANS. 07/22/2022 0.0     DF 07/22/2022 AUTOMATED     Sodium 07/22/2022 139     Potassium 07/22/2022 3.8     Chloride 07/22/2022 107     CO2 07/22/2022 24     Anion gap 07/22/2022 8     Glucose 07/22/2022 77     BUN 07/22/2022 13     Creatinine 07/22/2022 0.94 BUN/Creatinine ratio 07/22/2022 14     GFR est AA 07/22/2022 >60     GFR est non-AA 07/22/2022 >60     Calcium 07/22/2022 9.3     Bilirubin, total 07/22/2022 0.5     ALT (SGPT) 07/22/2022 33     AST (SGOT) 07/22/2022 22     Alk. phosphatase 07/22/2022 68     Protein, total 07/22/2022 8.0     Albumin 07/22/2022 4.0     Globulin 07/22/2022 4.0     A-G Ratio 07/22/2022 1.0 (A)    INR 07/22/2022 1.1     Prothrombin time 07/22/2022 11.8 (A)    Troponin-High Sensitivity 07/22/2022 4     Glucose (POC) 07/22/2022 73     Performed by 07/22/2022 Ami Hwang     ALCOHOL(ETHYL),SERUM 07/22/2022 90 (A)    Magnesium 07/22/2022 2.7 (A)    Phosphorus 07/22/2022 3.8     AMPHETAMINES 07/22/2022 Negative     BARBITURATES 07/22/2022 Negative     BENZODIAZEPINES 07/22/2022 Negative     COCAINE 07/22/2022 Negative     METHADONE 07/22/2022 Negative     OPIATES 07/22/2022 Negative     PCP(PHENCYCLIDINE) 07/22/2022 Negative     THC (TH-CANNABINOL) 07/22/2022 Negative     Drug screen comment 07/22/2022 (NOTE)     Ammonia, plasma 07/22/2022 23     Color 07/22/2022 YELLOW/STRAW     Appearance 07/22/2022 CLEAR     pH (UA) 07/22/2022 5.5     Protein 07/22/2022 Negative     Glucose 07/22/2022 Negative     Ketone 07/22/2022 Negative     Bilirubin 07/22/2022 Negative     Blood 07/22/2022 Negative     Urobilinogen 07/22/2022 0.2     Nitrites 07/22/2022 Negative     Leukocyte Esterase 07/22/2022 Negative     WBC 07/22/2022 0-4     RBC 07/22/2022 0-5     Epithelial cells 07/22/2022 FEW     Bacteria 07/22/2022 Negative     UA:UC IF INDICATED 07/22/2022 CULTURE NOT INDICATED BY UA RESULT     Vitamin B12 07/22/2022 277     TSH 07/22/2022 2.29     SAMPLES BEING HELD 07/22/2022 1PST     COMMENT 07/22/2022 Add-on orders for these samples will be processed based on acceptable specimen integrity and analyte stability, which may vary by analyte.      IVSd 07/25/2022 1.1 (A)    LVIDd 07/25/2022 4.0 (A)    LVIDs 07/25/2022 2.8     LVPWd 07/25/2022 1.1 (A) LVOT Peak Gradient 07/25/2022 3     LVOT Peak Velocity 07/25/2022 0.8     RVSP 07/25/2022 22     RV Free Wall Peak S' 07/25/2022 12     LA Diameter 07/25/2022 3.4     LA Volume A/L 07/25/2022 31     LA Volume 2C 07/25/2022 33     LA Volume 4C 07/25/2022 29     Est. RA Pressure 07/25/2022 3     AV Peak Gradient 07/25/2022 3     AV Peak Velocity 07/25/2022 0.9     MV E Velocity 07/25/2022 0.58     LV E' Lateral Velocity 07/25/2022 11     LV E' Septal Velocity 07/25/2022 9     MR Peak Gradient 07/25/2022 18     MR Peak Velocity 07/25/2022 2.1     Pulmonary Artery EDP 07/25/2022 18     PV Peak Gradient 07/25/2022 4     PV Max Velocity 07/25/2022 1.0     TAPSE 07/25/2022 1.9     TR Peak Gradient 07/25/2022 19     TR Max Velocity 07/25/2022 2.19     Aortic Root 07/25/2022 3.5     Fractional Shortening 2D 07/25/2022 30     LVIDd Index 07/25/2022 2.00     LVIDs Index 07/25/2022 1.40     LV RWT Ratio 07/25/2022 0.55     LV Mass 2D 07/25/2022 145.6     LV Mass 2D Index 07/25/2022 72.8     E/E' Ratio (Averaged) 07/25/2022 5.86     E/E' Lateral 07/25/2022 5.27     E/E' Septal 07/25/2022 6.44     LA Volume Index A/L 07/25/2022 16     LA Volume Index 2C 07/25/2022 17     LA Volume Index 4C 07/25/2022 15 (A)    LA Size Index 07/25/2022 1.70     LA/AO Root Ratio 07/25/2022 0.97     Ao Root Index 07/25/2022 1.75     AV Velocity Ratio 07/25/2022 0.89     Cholesterol, total 07/23/2022 142     Triglyceride 07/23/2022 49     HDL Cholesterol 07/23/2022 55     LDL, calculated 07/23/2022 77.2     VLDL, calculated 07/23/2022 9.8     CHOL/HDL Ratio 07/23/2022 2.6     Hemoglobin A1c 07/23/2022 5.5     Est. average glucose 07/23/2022 111     WBC 07/23/2022 6.1     RBC 07/23/2022 3.95 (A)    HGB 07/23/2022 13.3     HCT 07/23/2022 39.3     MCV 07/23/2022 99.5 (A)    MCH 07/23/2022 33.7     MCHC 07/23/2022 33.8     RDW 07/23/2022 12.4     PLATELET 44/23/5685 149     MPV 07/23/2022 9.5     NRBC 07/23/2022 0.0     ABSOLUTE NRBC 07/23/2022 0.00 Magnesium 07/23/2022 2.6 (A)    Sodium 07/23/2022 139     Potassium 07/23/2022 4.4     Chloride 07/23/2022 109 (A)    CO2 07/23/2022 28     Anion gap 07/23/2022 2 (A)    Glucose 07/23/2022 76     BUN 07/23/2022 14     Creatinine 07/23/2022 0.97     BUN/Creatinine ratio 07/23/2022 14     GFR est AA 07/23/2022 >60     GFR est non-AA 07/23/2022 >60     Calcium 07/23/2022 8.8     Bilirubin, total 07/23/2022 0.6     ALT (SGPT) 07/23/2022 31     AST (SGOT) 07/23/2022 22     Alk. phosphatase 07/23/2022 63     Protein, total 07/23/2022 7.4     Albumin 07/23/2022 3.4 (A)    Globulin 07/23/2022 4.0     A-G Ratio 07/23/2022 0.9 (A)    SAMPLES BEING HELD 07/23/2022 1RED     COMMENT 07/23/2022 Add-on orders for these samples will be processed based on acceptable specimen integrity and analyte stability, which may vary by analyte. Glucose (POC) 07/23/2022 109     Performed by 07/23/2022 Jarek Amaury     Glucose (POC) 07/24/2022 126 (A)    Performed by 07/24/2022 PETER Rodgers     Aspirin test 07/24/2022 387     P2Y12 Plt response 07/24/2022 191 (A)       IMAGING:  XR SHOULDER RT AP/LAT MIN 2 V    Result Date: 7/24/2022  No acute abnormality. CT HEAD WO CONT    Result Date: 7/24/2022  No significant interval change. CT PERF W CBF    Result Date: 7/25/2022  No significant cerebral perfusion abnormality     XR CHEST PORT    Result Date: 7/22/2022  No acute cardiopulmonary process. CTA CODE NEURO HEAD AND NECK W CONT    Result Date: 7/23/2022  Stable exam without evidence for acute large vessel arterial occlusion. No significant cerebral perfusion abnormality or mismatch. CTA CODE NEURO HEAD AND NECK W CONT    Result Date: 7/22/2022  1. No large vessel occlusion or hemodynamically significant carotid stenosis. 2.  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 3.  No perfusion abnormality. CT CODE NEURO HEAD WO CONTRAST    Result Date: 7/23/2022  1.  No intracranial hemorrhage. 2. Subtle CT evidence of an evolving acute left corona radiata infarct, in correlation with recent prior MRI. 3. No other significant change. CT CODE NEURO HEAD WO CONTRAST    Result Date: 7/22/2022  No significant interval change. No evidence of acute process. CT CODE NEURO PERF W CBF    Result Date: 7/23/2022  Stable exam without evidence for acute large vessel arterial occlusion. No significant cerebral perfusion abnormality or mismatch. CT CODE NEURO PERF W CBF    Result Date: 7/22/2022  1. No large vessel occlusion or hemodynamically significant carotid stenosis. 2.  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 3.  No perfusion abnormality. EKG:  No results found for this or any previous visit.           Ekta Rico MD  7/25/2022 8:41 PM

## 2022-07-25 NOTE — PROGRESS NOTES
Transition of Care Plan: likely IPR (AMANDA-referral pending)    RUR: 7% low    PCP F/U: Jessica Bronson    Disposition: likely IPR (choices pending)    Transportation: family    Main Contact: [facility] [phone]: Spoke with OT. Now recommending IPR. Attending approves for this CM to set up. Will discuss with patient/family    [phone]: Spoke with family. Would like referral sent to [facility]. Referral sent.      Stanislav Adams RN, CRM

## 2022-07-25 NOTE — PROGRESS NOTES
Problem: Self Care Deficits Care Plan (Adult)  Goal: *Acute Goals and Plan of Care (Insert Text)  Description: FUNCTIONAL STATUS PRIOR TO ADMISSION: Patient was independent with ADLs, IADLs, and functional mobility/ ambulatory without AD. Note history of left-sided craniotomy with underlying encephalomalacia per imaging, but patient and wife confirmed he was still independent PTA with no R hemiparesis. Working as a , delivering soda. HOME SUPPORT: Patient resided with his wife and did not require assistance    Occupational Therapy Goals  Initiated 7/25/2022  1. Patient will perform grooming standing at sink with contact guard assist within 7 day(s). 2.  Patient will perform bathing with minimal assistance within 7 day(s). 3.  Patient will perform upper body dressing with minimal assistance within 7 day(s). 4.  Patient will perform lower body dressing with moderate assistance within 7 days. 5.  Patient will perform toilet transfers with minimal assistance within 7 day(s). 6.  Patient will perform all aspects of toileting with moderate assistance  within 7 day(s). 7.  Patient will participate in upper extremity therapeutic exercise/activities with minimal assistance for 10 minutes within 7 day(s). 8.  Patient will utilize fall prevention activities with verbal cues within 7 day(s). 9.  Patient will improve their Fugl Mathews score by 5 points in prep for ADLs within 7 days.     Outcome: Not Met     OCCUPATIONAL THERAPY EVALUATION  Patient: Mary Tejeda (57 y.o. male)  Date: 7/25/2022  Primary Diagnosis: CVA (cerebral vascular accident) [facility]) [I63.9]       Precautions:  Fall    ASSESSMENT  Patient presents with moderate-severe R hemiparesis affecting UE>LE (RUE overall 3- to 3+/5, very limited function), impaired balance, dysarthria, confusion, disorientation, decreased insight into deficits, impaired safety awareness, and moderate neglect to R body and environment s/p admission for CVA with MRI positive for L corona radiata infarct. Note patient was evaluated by PT on 7/23 earlier this admission and ambulated ~300 ft at [facility] level but has since had 2 code strokes with increase in symptoms. Today in OT evaluation, patient required totalA to don/ doff socks, totalA to manage urinal in standing, maxA to doff shirt/ don gown, [place] for sit-stand, and modA to pivot to chair. Patient had R and posterior lean in standing requiring constant steadying. At baseline prior to acute CVA patient was independent, RUE dominant, and working as a . He is significantly below independent functional baseline and would benefit from inpatient rehab at d/c. Current Level of Function Impacting Discharge (ADLs/self-care): minimum to maximum assistance UB ADLs, total assistance LB ADLs, minimum assistance sit-stand, moderate assistance to pivot    Functional Outcome Measure: The patient scored Total A-D  Total A-D (Motor Function): 20/66 on the Fugl-Mathews Assessment with RUE which is indicative of moderate severe impairment in upper extremity functional status. Patient will benefit from skilled therapy intervention to address the above noted impairments. PLAN :  Recommendations and Planned Interventions: self care training, functional mobility training, therapeutic exercise, balance training, visual/perceptual training, therapeutic activities, cognitive retraining, endurance activities, neuromuscular re-education, patient education, home safety training, and family training/education    Frequency/Duration: Patient will be followed by occupational therapy 5 times a week to address goals.     Recommendation for discharge: (in order for the patient to meet his/her long term goals)  Therapy 3 hours per day 5-7 days per week    This discharge recommendation:  Has been made in collaboration with the attending provider and/or case management         SUBJECTIVE:   Patient stated It's hard to move this [right] side.     OBJECTIVE DATA SUMMARY:   HISTORY:   Past Medical History:   Diagnosis Date    Epilepsy ([facility] [address]      Past Surgical History:   Procedure Laterality Date    NEUROLOGICAL PROCEDURE UNLISTED       Expanded or extensive additional review of patient history:     Home Situation  Home Environment: Private residence  # Steps to Enter: 4  Rails to Enter: Yes  One/Two Story Residence: Two story  Living Alone: No  Support Systems: Spouse/Significant Other  Patient Expects to be Discharged to[de-identified] Rehab hospital/unit acute  Current DME Used/Available at Home: None  Tub or Shower Type: Shower    Hand dominance: Right    EXAMINATION OF PERFORMANCE DEFICITS:  Cognitive/Behavioral Status:  Neurologic State: Alert  Orientation Level: Oriented to person;Oriented to place; Disoriented to time;Oriented to situation  Cognition: Follows commands  Perception: Cues to attend to right side of body;Cues to attend right visual field          Skin: visible skin appears intact    Edema: none noted    Hearing: WDL    Vision/Perceptual:    Tracking: Able to track left of midline; Able to track right of midline              Visual Fields:  (intact)                 Range of Motion:    AROM:  (LUE intact, RUE non-functional)                         Strength:    Strength:  (R hemiparesis affecting UE>LE)                Coordination:     Fine Motor Skills-Upper: Left Intact; Right Impaired    Gross Motor Skills-Upper: Left Intact; Right Impaired    Tone & Sensation:    Tone: Abnormal (RUE hypotonic)  Sensation: Intact (reports intact, although exam limited)                      Balance:  Sitting: Impaired  Sitting - Static: Fair (occasional)  Sitting - Dynamic: Poor (constant support); Fair (occasional)  Standing: Impaired  Standing - Static: Poor;Constant support  Standing - Dynamic : Poor;Constant support    Functional Mobility and Transfers for ADLs:  Bed Mobility:  Supine to Sit: Minimum assistance    Transfers:  Sit to Stand: Minimum assistance  Stand to Sit: Minimum assistance  Bed to Chair: Moderate assistance    ADL Assessment:  Feeding: Minimum assistance (inferred for RUE weakness, bimanual tasks)    Oral Facial Hygiene/Grooming: Minimum assistance (inferred for RUE weakness, for bimanual tasks)    Bathing: Moderate assistance (inferred for balance, R HP)         Upper Body Dressing: Maximum assistance (for doffing shirt and donning gown)    Lower Body Dressing: Total assistance (for doffing/ donning socks and inferred overall)    Toileting:  Total assistance (for managing urinal while standing and inferred overall)      Functional Measure:  Fugl-Mathews Assessment of Motor Recovery after Stroke:   Upper Extremity Assessment: Yes (RUE)    Reflex Activity  Flexors/Biceps/Fingers: Can be elicited  Extensors/Triceps: Can be elicited  Reflex Subtotal: 4    Volitional Movement Within Synergies  Shoulder Retraction: Partial  Shoulder Elevation: Partial  Shoulder Abduction (90 degrees): Partial  Shoulder External Rotation: Partial  Elbow Flexion: Partial  Forearm Supination: Partial  Shoulder Adduction/Internal Rotation: Partial  Elbow Extension: Partial  Forearm Pronation: Partial  Subtotal: 9    Volitional Movement Mixing Synergies  Hand to Lumbar Spine: None  Shoulder Flexion (0-90 degrees): None  Pronation-Supination: None  Subtotal: 0    Volitional Movement With Little or No Synergy  Shoulder Abduction (0-90 degrees): None  Shoulder Flexion ( degrees): None  Pronation/Supination: None  Subtotal : 0    Normal Reflex Activity  Biceps, Triceps, Finger Flexors: Partial  Subtotal : 1    Upper Extremity Total   Upper Extremity Total: 14    Wrist  Stability at 15 Degree Dorsiflexion: Partial  Repeated Dorsiflexion/ Volar Flexion: Partial  Stability at 15 Degree Dorsiflexion: Partial  Repeated Dorsiflexion/ Volar Flexion: Partial  Circumduction: None  Wrist Total: 4    Hand  Mass Flexion: Partial  Mass Extension: Partial  Grasp A: None  Grasp B: None  Grasp C: None  Grasp D: None  Grasp E: None  Hand Total: 2    Coordination/Speed  Tremor: Marked  Dysmetria: Marked  Time: >5s  Coordination/Speed Total : 0    Total A-D  Total A-D (Motor Function): 20/66     This is a reliable/valid measure of arm function after a neurological event. It has established value to characterize functional status and for measuring spontaneous and therapy-induced recovery; tests proximal and distal motor functions. Fugl-Mathews Assessment - UE scores recorded between five and 30 days post neurologic event can be used to predict UE recovery at six months post neurologic event. Severe = 0-21 points   Moderately Severe = 22-33 points   Moderate = 34-47 points   Mild = 48-66 points  MIGUEL Reza, MANN White, & OPAL De Jesus (1992). Measurement of motor recovery after stroke: Outcome assessment and sample size requirements.  Stroke, 23, pp. 1273-1359.   ------------------------------------------------------------------------------------------------------------------------------------------------------------------  MCID:  Stroke:   Natalie Welch et al, 2001; n = 171; mean age 79 (6) years; assessed within 16 (12) days of stroke, Acute Stroke)  FMA Motor Scores from Admission to Discharge   10 point increase in FMA Upper Extremity = 1.5 change in discharge FIM   10 point increase in FMA Lower Extremity = 1.9 change in discharge FIM  MDC:   Stroke:   Donella Fothergill et al, 2008, n = 14, mean age = 59.9 (14.6) years, assessed on average 14 (6.5) months post stroke, Chronic Stroke)   FMA = 5.2 points for the Upper Extremity portion of the assessment     Occupational Therapy Evaluation Charge Determination   History Examination Decision-Making   LOW Complexity : Brief history review  MEDIUM Complexity : 3-5 performance deficits relating to physical, cognitive , or psychosocial skils that result in activity limitations and / or participation restrictions MEDIUM Complexity : Patient may present with comorbidities that affect occupational performnce. Miniml to moderate modification of tasks or assistance (eg, physical or verbal ) with assesment(s) is necessary to enable patient to complete evaluation       Based on the above components, the patient evaluation is determined to be of the following complexity level: LOW   Pain Rating:  Patient did not report pain    Activity Tolerance:   Good    After treatment patient left in no apparent distress:    Sitting in chair, Call bell within reach, Bed / chair alarm activated, and Caregiver / family present    COMMUNICATION/EDUCATION:   The patients plan of care was discussed with: Physical therapist and Registered nurse. Patient was educated regarding his deficit(s) of R hemiparesis and impaired balance as this relates to his diagnosis of CVA. He demonstrated Fair understanding as evidenced by verbal response. Patient and/or family was verbally educated on the BE FAST acronym for signs/symptoms of CVA and TIA. BE FAST was written on patient's communication board  for visual education and reinforcement. All questions answered with patient indicating fair understanding. Home safety education was provided and the patient/caregiver indicated understanding., Patient/family have participated as able in goal setting and plan of care. , and Patient/family agree to work toward stated goals and plan of care. This patients plan of care is appropriate for delegation to URBANO.     Thank you for this referral.  Zara Gasca, OT  Time Calculation: 36 mins

## 2022-07-25 NOTE — PROGRESS NOTES
Problem: Mobility Impaired (Adult and Pediatric)  Goal: *Acute Goals and Plan of Care (Insert Text)  Description:   FUNCTIONAL STATUS PRIOR TO ADMISSION: Patient was independent and active without use of DME, worked as a  delivering drinks. HOME SUPPORT PRIOR TO ADMISSION: The patient lived with his wife but did not require assist.    Physical Therapy Goals  Revised 7/25/2022  1. Patient will move from supine to sit and sit to supine  in bed with supervision/set-up within 7 day(s). 2.  Patient will transfer from bed to chair and chair to bed with supervision/set-up using the least restrictive device within 7 day(s). 3.  Patient will perform sit to stand with supervision/set-up within 7 day(s). 4.  Patient will ambulate with minimal assistance/contact guard assist for 50 feet with the least restrictive device within 7 day(s). 5.  Patient will ascend/descend 12 stairs with one handrail(s) with minimal assistance/contact guard assist within 7 day(s). 6.  Patient will improve Hill Balance score by 7 points within 7 days. Initiated 7/23/2022  1. Patient will move from supine to sit and sit to supine  in bed with independence within 7 day(s). 2.  Patient will transfer from bed to chair and chair to bed with independence using the least restrictive device within 7 day(s). 3.  Patient will perform sit to stand with independence within 7 day(s). 4.  Patient will ambulate with independence for 500 feet with the least restrictive device within 7 day(s). 5.  Patient will ascend/descend 12 stairs with handrail(s) with modified independence within 7 day(s). 6.  Patient will improve Hill Balance score by 7 points within 7 days.       Outcome: Progressing Towards Goal   PHYSICAL THERAPY REEVALUATION  Patient: Elvia Baker (57 y.o. male)  Date: 7/25/2022  Primary Diagnosis: CVA (cerebral vascular accident) [facility]) [I63.9]       Precautions:   Fall      ASSESSMENT  Based on the objective data described below, the patient presents with decreased activity tolerance, R sided weakness, and balance deficits. Pt with 2 code strokes since initial evaluation. CT head s/p cods stroke \"subtle CT evidence of an evolving acute left corona radiata infarct\" and no MRI head completed. Pt received in chair with wife present. Pt performed multiple sit<>stand demonstrating difficulty weight shifting forward and bearing weight through RLE. Pt with buckling of R knee requiring to return to sitting. Provided rolling walker demonstrating slight improvements. Pt progressed to stepping forward continuing to present with buckling of R knee requiring mod A. Pt presents significantly below baseline for mobility and would be a good candidate for IPR upon discharge. Current Level of Function Impacting Discharge (mobility/balance): mod A sit<>stand     Functional Outcome Measure: The patient scored 3/56 on the Hill Balance outcome measure which is indicative of patient is a high fall risk. Other factors to consider for discharge: fall risk, code stroke, R sided weakness     Patient will benefit from skilled therapy intervention to address the above noted impairments. PLAN :  Recommendations and Planned Interventions: bed mobility training, transfer training, gait training, therapeutic exercises, neuromuscular re-education, patient and family training/education, and therapeutic activities      Frequency/Duration: Patient will be followed by physical therapy:  5 times a week to address goals. Recommendation for discharge: (in order for the patient to meet his/her long term goals)  Therapy 3 hours per day 5-7 days per week    This discharge recommendation:  Has been made in collaboration with the attending provider and/or case management    Equipment recommendations for successful discharge (if) home: walker: rolling and wheelchair         SUBJECTIVE:   Patient stated Soniya Zimmerman is my. .my wife.     OBJECTIVE DATA SUMMARY:   HISTORY: Past Medical History:   Diagnosis Date    Epilepsy [facility])      Past Surgical History:   Procedure Laterality Date    [place] 88 course since last seen and reason for reevaluation: 2 code strokes    Personal factors and/or comorbidities impacting plan of care: PMH    Home Situation  Home Environment: Private residence  # Steps to Enter: 4  Rails to Enter: Yes  One/Two Story Residence: Two story  Living Alone: No  Support Systems: Spouse/Significant Other  Patient Expects to be Discharged to[de-identified] Rehab hospital/unit acute  Current DME Used/Available at Home: None  Tub or Shower Type: Shower    EXAMINATION/PRESENTATION/DECISION MAKING:   Critical Behavior:  Neurologic State: Alert  Orientation Level: Oriented to person, Oriented to place, Disoriented to time, Oriented to situation  Cognition: Follows commands  Safety/Judgement: Awareness of environment, Insight into deficits  Hearing:     Skin:  intact  Edema: none  Range Of Motion:  AROM:  (LUE intact, RUE non-functional)                       Strength:    Strength:  (R hemiparesis affecting UE>LE)      3-/5 R knee extension   3+/5 R knee flexion              Tone & Sensation:   Tone: Abnormal (RUE hypotonic)              Sensation: Intact (reports intact, although exam limited)               Coordination:     Vision:   Tracking: Able to track left of midline; Able to track right of midline  Visual Fields:  (intact)  Functional Mobility:  Bed Mobility:     Supine to Sit: Minimum assistance        Transfers:  Sit to Stand: Moderate assistance; Adaptive equipment; Additional time  Stand to Sit: Moderate assistance; Adaptive equipment; Additional time        Bed to Chair: Moderate assistance              Balance:   Sitting: Impaired; Without support  Sitting - Static: Fair (occasional)  Sitting - Dynamic: Fair (occasional)  Standing: Impaired; With support  Standing - Static: Fair;Constant support  Standing - Dynamic : Poor;Constant support  Ambulation/Gait Training:  Distance (ft): 2 Feet (ft)  Assistive Device: Gait belt;Walker, rolling  Ambulation - Level of Assistance: Moderate assistance; Adaptive equipment; Additional time        Gait Abnormalities: Decreased step clearance; Ataxic;Trunk sway increased; Path deviations; Step to gait        Base of Support: Shift to left;Center of gravity altered  Stance: Weight shift  Speed/Jennifer: Pace decreased (<100 feet/min); Slow  Step Length: Left shortened;Right shortened  Swing Pattern: Left asymmetrical;Right asymmetrical      Functional Measure:  Hill Balance Test:    Sitting to Standin  Standing Unsupported: 0  Sitting with Back Unsupported: 2  Standing to Sittin  Transfers: 1  Standing Unsupported with Eyes Closed: 0  Standing Unsupported with Feet Together: 0  Reach Forward with Outstretched Arm: 0   Object: 0  Turn to Look Over Shoulders: 0  Turn 360 Degrees: 0  Alternate Foot on Step/Stool: 0  Standing Unsupported One Foot in Front: 0  Stand on One Le  Total: 3         56=Maximum possible score;   0-20=High fall risk  21-40=Moderate fall risk   41-56=Low fall risk       Activity Tolerance:   Fair and requires rest breaks    After treatment patient left in no apparent distress:   Sitting in chair, Call bell within reach, Bed / chair alarm activated, and Caregiver / family present    COMMUNICATION/EDUCATION:   The patients plan of care was discussed with: Occupational therapist, Registered nurse, Physician, and Case management. Patient was educated regarding His deficit(s) of R sided weakness and balance as this relates to His diagnosis of CVA. He demonstrated Fair understanding as evidenced by head nodding. Patient and/or family was verbally educated on the BE FAST acronym for signs/symptoms of CVA and TIA. BE FAST was written on patient's communication board  for visual education and reinforcement. All questions answered with patient indicating fair understanding. Fall prevention education was provided and the patient/caregiver indicated understanding., Patient/family have participated as able in goal setting and plan of care. , and Patient/family agree to work toward stated goals and plan of care.     Thank you for this referral.  Alex Ybarra, PT   Time Calculation: 17 mins

## 2022-07-25 NOTE — PROGRESS NOTES
Problem: Patient Education: Go to Patient Education Activity  Goal: Patient/Family Education  Outcome: Progressing Towards Goal     Problem: Falls - Risk of  Goal: *Absence of Falls  Description: Document Harper Flow Fall Risk and appropriate interventions in the flowsheet.   Outcome: Progressing Towards Goal  Note: Fall Risk Interventions:  Mobility Interventions: Bed/chair exit alarm, Communicate number of staff needed for ambulation/transfer, Patient to call before getting OOB    Mentation Interventions: Bed/chair exit alarm, Increase mobility, More frequent rounding, Reorient patient    Medication Interventions: Bed/chair exit alarm, Evaluate medications/consider consulting pharmacy, Teach patient to arise slowly, Patient to call before getting OOB    Elimination Interventions: Call light in reach, Patient to call for help with toileting needs, Stay With Me (per policy)              Problem: Patient Education: Go to Patient Education Activity  Goal: Patient/Family Education  Outcome: Progressing Towards Goal     Problem: Patient Education: Go to Patient Education Activity  Goal: Patient/Family Education  Outcome: Progressing Towards Goal     Problem: TIA/CVA Stroke: 0-24 hours  Goal: Off Pathway (Use only if patient is Off Pathway)  Outcome: Progressing Towards Goal  Goal: Activity/Safety  Outcome: Progressing Towards Goal  Goal: Consults, if ordered  Outcome: Progressing Towards Goal  Goal: Diagnostic Test/Procedures  Outcome: Progressing Towards Goal  Goal: Nutrition/Diet  Outcome: Progressing Towards Goal  Goal: Discharge Planning  Outcome: Progressing Towards Goal  Goal: Medications  Outcome: Progressing Towards Goal  Goal: Respiratory  Outcome: Progressing Towards Goal  Goal: Treatments/Interventions/Procedures  Outcome: Progressing Towards Goal  Goal: Minimize risk of bleeding post-thrombolytic infusion  Outcome: Progressing Towards Goal  Goal: Monitor for complications post-thrombolytic infusion  Outcome: Progressing Towards Goal  Goal: Psychosocial  Outcome: Progressing Towards Goal  Goal: *Hemodynamically stable  Outcome: Progressing Towards Goal  Goal: *Neurologically stable  Description: Absence of additional neurological deficits    Outcome: Progressing Towards Goal  Goal: *Verbalizes anxiety and depression are reduced or absent  Outcome: Progressing Towards Goal  Goal: *Absence of Signs of Aspiration on Current Diet  Outcome: Progressing Towards Goal  Goal: *Absence of deep venous thrombosis signs and symptoms(Stroke Metric)  Outcome: Progressing Towards Goal  Goal: *Ability to perform ADLs and demonstrates progressive mobility and function  Outcome: Progressing Towards Goal  Goal: *Stroke education started(Stroke Metric)  Outcome: Progressing Towards Goal  Goal: *Dysphagia screen performed(Stroke Metric)  Outcome: Progressing Towards Goal  Goal: *Rehab consulted(Stroke Metric)  Outcome: Progressing Towards Goal     Problem: TIA/CVA Stroke: Day 2 Until Discharge  Goal: Off Pathway (Use only if patient is Off Pathway)  Outcome: Progressing Towards Goal  Goal: Activity/Safety  Outcome: Progressing Towards Goal  Goal: Diagnostic Test/Procedures  Outcome: Progressing Towards Goal  Goal: Nutrition/Diet  Outcome: Progressing Towards Goal  Goal: Discharge Planning  Outcome: Progressing Towards Goal  Goal: Medications  Outcome: Progressing Towards Goal  Goal: Respiratory  Outcome: Progressing Towards Goal  Goal: Treatments/Interventions/Procedures  Outcome: Progressing Towards Goal  Goal: Psychosocial  Outcome: Progressing Towards Goal  Goal: *Verbalizes anxiety and depression are reduced or absent  Outcome: Progressing Towards Goal  Goal: *Absence of aspiration  Outcome: Progressing Towards Goal  Goal: *Absence of deep venous thrombosis signs and symptoms(Stroke Metric)  Outcome: Progressing Towards Goal  Goal: *Optimal pain control at patient's stated goal  Outcome: Progressing Towards Goal  Goal: *Tolerating diet  Outcome: Progressing Towards Goal  Goal: *Ability to perform ADLs and demonstrates progressive mobility and function  Outcome: Progressing Towards Goal  Goal: *Stroke education continued(Stroke Metric)  Outcome: Progressing Towards Goal

## 2022-07-25 NOTE — PROGRESS NOTES
Problem: Falls - Risk of  Goal: *Absence of Falls  Description: Document [place] Fall Risk and appropriate interventions in the flowsheet.   Outcome: Progressing Towards Goal  Note: Fall Risk Interventions:  Mobility Interventions: Bed/chair exit alarm, Communicate number of staff needed for ambulation/transfer, Patient to call before getting OOB, OT consult for ADLs, PT Consult for mobility concerns, PT Consult for assist device competence    Mentation Interventions: Adequate sleep, hydration, pain control, Bed/chair exit alarm, Door open when patient unattended, Toileting rounds, Update white board, Increase mobility    Medication Interventions: Bed/chair exit alarm, Evaluate medications/consider consulting pharmacy, Patient to call before getting OOB, Teach patient to arise slowly    Elimination Interventions: Bed/chair exit alarm, Call light in reach, Patient to call for help with toileting needs, Stay With Me (per policy), Toilet paper/wipes in reach, Toileting schedule/hourly rounds, Urinal in reach

## 2022-07-26 LAB — LEVETIRACETAM SERPL-MCNC: <1 UG/ML (ref 10–40)

## 2022-07-26 PROCEDURE — 97530 THERAPEUTIC ACTIVITIES: CPT

## 2022-07-26 PROCEDURE — 65270000046 HC RM TELEMETRY

## 2022-07-26 PROCEDURE — 74011250637 HC RX REV CODE- 250/637: Performed by: PSYCHIATRY & NEUROLOGY

## 2022-07-26 PROCEDURE — 74011250637 HC RX REV CODE- 250/637: Performed by: NURSE PRACTITIONER

## 2022-07-26 PROCEDURE — 74011250637 HC RX REV CODE- 250/637: Performed by: STUDENT IN AN ORGANIZED HEALTH CARE EDUCATION/TRAINING PROGRAM

## 2022-07-26 PROCEDURE — 97535 SELF CARE MNGMENT TRAINING: CPT

## 2022-07-26 PROCEDURE — 97116 GAIT TRAINING THERAPY: CPT

## 2022-07-26 PROCEDURE — 97112 NEUROMUSCULAR REEDUCATION: CPT

## 2022-07-26 PROCEDURE — 74011000250 HC RX REV CODE- 250: Performed by: NURSE PRACTITIONER

## 2022-07-26 RX ADMIN — CLOPIDOGREL BISULFATE 75 MG: 75 TABLET ORAL at 10:04

## 2022-07-26 RX ADMIN — CYANOCOBALAMIN TAB 500 MCG 500 MCG: 500 TAB at 10:03

## 2022-07-26 RX ADMIN — Medication 3 MG: at 21:58

## 2022-07-26 RX ADMIN — ACETAMINOPHEN 650 MG: 325 TABLET ORAL at 00:49

## 2022-07-26 RX ADMIN — ACETAMINOPHEN 650 MG: 325 TABLET ORAL at 21:58

## 2022-07-26 RX ADMIN — ATORVASTATIN CALCIUM 40 MG: 40 TABLET, FILM COATED ORAL at 10:04

## 2022-07-26 RX ADMIN — ASPIRIN 81 MG CHEWABLE TABLET 81 MG: 81 TABLET CHEWABLE at 10:03

## 2022-07-26 RX ADMIN — Medication 3 MG: at 00:49

## 2022-07-26 NOTE — PROGRESS NOTES
[facility] Hospitalist Service Progress Note    INTERVAL HISTORY  / Subjective:    Assessment / Plan:  Has continued LUE Weakness > LLE Weakness, again otherwise no complaints     ACute CVA with right-sided hemiparesis  --07/23 Small focal acute infarct at the left corona radiata. Left-sided craniotomy changes and underlying encephalomalacia/postsurgical  change. -- Consulted, he has been started on aspirin, Plavix, high intensity statin, and Keppra for concern for seizure-like activity -- ETA did not show any large vessel occlusion, EEG pending, 2D echo pending  --07/24 ARU, PRU with expected antiplatlet effect, resumed on ASA  Plavix, statin , and keppra, ECHO pending   --07/25 ECHO perfromed no LV Throbus or PFO, Normal EF, resume on ASA + Plavix + Atorvastatin,  will need to plan for IPR   --07/26 Pending In patient rehab placement     Traumatic brain injury  - Subdural hemorrhage requiring left-sided craniotomy, with underlying encephalomalacia          Chief Complaint : Cerebrovascular Accident        Objective:  Visit Vitals  BP (!) 132/95 (BP 1 Location: Left upper arm, BP Patient Position: At rest)   Pulse 61   Temp 97.7 °F (36.5 °C)   Resp 18   Ht 5' 8\" (1.727 m)   Wt 86.2 kg (190 lb 0.6 oz)   SpO2 93%   BMI 28.89 kg/m²                 Physical Exam:  General: No acute distress, speaking in full sentences, no use of accessory muscles   HEENT: Pupils equal and reactive to light and accommodation, oropharynx is clear   Neck: Supple, no lymphadenopathy, no JVD   Lungs: Clear to auscultation bilaterally   Cardiovascular: Regular rate and rhythm with normal S1 and S2   Abdomen: Soft, nontender, nondistended, normoactive bowel sounds   Extremities: No cyanosis clubbing or edema   Neuro: Facial droop, right upper and lower extremity weakness , A&O x3   Psych: Normal affect     Intake and Output:  Date 07/25/22 1900 - 07/26/22 0659 07/26/22 0700 - 07/27/22 0659   Shift 0533-5529 24 Hour Total [id number] 1900-0659 24 Hour Total   INTAKE   P.O. 320 920        P. O. 320 920      Shift Total(mL/kg) 320(3.7) 920(10.7)      OUTPUT   Urine(mL/kg/hr) 800 800 350(0.3)  350     Urine Voided 800 800 350  350   Shift Total(mL/kg) 800(9.3) 800(9.3) 350(4.1)  350(4.1)   NET -480 120 -350  -350   Weight (kg) 86.2 86.2 86.2 86.2 86.2         LAB:  Admission on 07/22/2022   Component Date Value    Ventricular Rate 07/22/2022 52     Atrial Rate 07/22/2022 52     P-R Interval 07/22/2022 168     QRS Duration 07/22/2022 100     Q-T Interval 07/22/2022 440     QTC Calculation (Bezet) 07/22/2022 409     Calculated P Axis 07/22/2022 48     Calculated R Axis 07/22/2022 50     Calculated T Axis 07/22/2022 2     Diagnosis 07/22/2022                      Value:Sinus bradycardia  Minimal voltage criteria for LVH, may be normal variant ( Sokolow-Downing )  Nonspecific T wave abnormality  Abnormal ECG  When compared with ECG of 31-OCT-2019 19:06,  Nonspecific T wave abnormality now evident in Lateral leads  Confirmed by Annette Gant ([id number]) on 7/23/2022 3:01:03 PM      WBC 07/22/2022 5.7     RBC 07/22/2022 4.32     HGB 07/22/2022 14.5     HCT 07/22/2022 42.3     MCV 07/22/2022 97.9     MCH 07/22/2022 33.6     MCHC 07/22/2022 34.3     RDW 07/22/2022 12.3     PLATELET 57/05/7284 648     MPV 07/22/2022 9.3     NRBC 07/22/2022 0.0     ABSOLUTE NRBC 07/22/2022 0.00     NEUTROPHILS 07/22/2022 42     LYMPHOCYTES 07/22/2022 45     MONOCYTES 07/22/2022 11     EOSINOPHILS 07/22/2022 1     BASOPHILS 07/22/2022 1     IMMATURE GRANULOCYTES 07/22/2022 0     ABS. NEUTROPHILS 07/22/2022 2.4     ABS. LYMPHOCYTES 07/22/2022 2.5     ABS. MONOCYTES 07/22/2022 0.7     ABS. EOSINOPHILS 07/22/2022 0.1     ABS. BASOPHILS 07/22/2022 0.1     ABS. IMM.  GRANS. 07/22/2022 0.0     DF 07/22/2022 AUTOMATED     Sodium 07/22/2022 139     Potassium 07/22/2022 3.8     Chloride 07/22/2022 107     CO2 07/22/2022 24     Anion gap 07/22/2022 8     Glucose 07/22/2022 77     BUN 07/22/2022 13 Creatinine 07/22/2022 0.94     BUN/Creatinine ratio 07/22/2022 14     GFR est AA 07/22/2022 >60     GFR est non-AA 07/22/2022 >60     Calcium 07/22/2022 9.3     Bilirubin, total 07/22/2022 0.5     ALT (SGPT) 07/22/2022 33     AST (SGOT) 07/22/2022 22     Alk. phosphatase 07/22/2022 68     Protein, total 07/22/2022 8.0     Albumin 07/22/2022 4.0     Globulin 07/22/2022 4.0     A-G Ratio 07/22/2022 1.0 (A)    INR 07/22/2022 1.1     Prothrombin time 07/22/2022 11.8 (A)    Troponin-High Sensitivity 07/22/2022 4     Glucose (POC) 07/22/2022 73     Performed by 07/22/2022 Ami Eri     ALCOHOL(ETHYL),SERUM 07/22/2022 90 (A)    Magnesium 07/22/2022 2.7 (A)    Phosphorus 07/22/2022 3.8     AMPHETAMINES 07/22/2022 Negative     BARBITURATES 07/22/2022 Negative     BENZODIAZEPINES 07/22/2022 Negative     COCAINE 07/22/2022 Negative     METHADONE 07/22/2022 Negative     OPIATES 07/22/2022 Negative     PCP(PHENCYCLIDINE) 07/22/2022 Negative     THC (TH-CANNABINOL) 07/22/2022 Negative     Drug screen comment 07/22/2022 (NOTE)     Ammonia, plasma 07/22/2022 23     Color 07/22/2022 YELLOW/STRAW     Appearance 07/22/2022 CLEAR     pH (UA) 07/22/2022 5.5     Protein 07/22/2022 Negative     Glucose 07/22/2022 Negative     Ketone 07/22/2022 Negative     Bilirubin 07/22/2022 Negative     Blood 07/22/2022 Negative     Urobilinogen 07/22/2022 0.2     Nitrites 07/22/2022 Negative     Leukocyte Esterase 07/22/2022 Negative     WBC 07/22/2022 0-4     RBC 07/22/2022 0-5     Epithelial cells 07/22/2022 FEW     Bacteria 07/22/2022 Negative     UA:UC IF INDICATED 07/22/2022 CULTURE NOT INDICATED BY UA RESULT     Vitamin B12 07/22/2022 277     TSH 07/22/2022 2.29     Levetiracetam (Keppra) 07/22/2022 <1.0 (A)    SAMPLES BEING HELD 07/22/2022 1PST     COMMENT 07/22/2022 Add-on orders for these samples will be processed based on acceptable specimen integrity and analyte stability, which may vary by analyte.      IVSd 07/25/2022 1.1 (A) LVIDd 07/25/2022 4.0 (A)    LVIDs 07/25/2022 2.8     LVPWd 07/25/2022 1.1 (A)    LVOT Peak Gradient 07/25/2022 3     LVOT Peak Velocity 07/25/2022 0.8     RVSP 07/25/2022 22     RV Free Wall Peak S' 07/25/2022 12     LA Diameter 07/25/2022 3.4     LA Volume A/L 07/25/2022 31     LA Volume 2C 07/25/2022 33     LA Volume 4C 07/25/2022 29     Est. RA Pressure 07/25/2022 3     AV Peak Gradient 07/25/2022 3     AV Peak Velocity 07/25/2022 0.9     MV E Velocity 07/25/2022 0.58     LV E' Lateral Velocity 07/25/2022 11     LV E' Septal Velocity 07/25/2022 9     MR Peak Gradient 07/25/2022 18     MR Peak Velocity 07/25/2022 2.1     Pulmonary Artery EDP 07/25/2022 18     PV Peak Gradient 07/25/2022 4     PV Max Velocity 07/25/2022 1.0     TAPSE 07/25/2022 1.9     TR Peak Gradient 07/25/2022 19     TR Max Velocity 07/25/2022 2.19     Aortic Root 07/25/2022 3.5     Fractional Shortening 2D 07/25/2022 30     LVIDd Index 07/25/2022 2.00     LVIDs Index 07/25/2022 1.40     LV RWT Ratio 07/25/2022 0.55     LV Mass 2D 07/25/2022 145.6     LV Mass 2D Index 07/25/2022 72.8     E/E' Ratio (Averaged) 07/25/2022 5.86     E/E' Lateral 07/25/2022 5.27     E/E' Septal 07/25/2022 6.44     LA Volume Index A/L 07/25/2022 16     LA Volume Index 2C 07/25/2022 17     LA Volume Index 4C 07/25/2022 15 (A)    LA Size Index 07/25/2022 1.70     LA/AO Root Ratio 07/25/2022 0.97     Ao Root Index 07/25/2022 1.75     AV Velocity Ratio 07/25/2022 0.89     Cholesterol, total 07/23/2022 142     Triglyceride 07/23/2022 49     HDL Cholesterol 07/23/2022 55     LDL, calculated 07/23/2022 77.2     VLDL, calculated 07/23/2022 9.8     CHOL/HDL Ratio 07/23/2022 2.6     Hemoglobin A1c 07/23/2022 5.5     Est. average glucose 07/23/2022 111     WBC 07/23/2022 6.1     RBC 07/23/2022 3.95 (A)    HGB 07/23/2022 13.3     HCT 07/23/2022 39.3     MCV 07/23/2022 99.5 (A)    MCH 07/23/2022 33.7     MCHC 07/23/2022 33.8     RDW 07/23/2022 12.4     PLATELET 84/69/5175 924 MPV 07/23/2022 9.5     NRBC 07/23/2022 0.0     ABSOLUTE NRBC 07/23/2022 0.00     Magnesium 07/23/2022 2.6 (A)    Sodium 07/23/2022 139     Potassium 07/23/2022 4.4     Chloride 07/23/2022 109 (A)    CO2 07/23/2022 28     Anion gap 07/23/2022 2 (A)    Glucose 07/23/2022 76     BUN 07/23/2022 14     Creatinine 07/23/2022 0.97     BUN/Creatinine ratio 07/23/2022 14     GFR est AA 07/23/2022 >60     GFR est non-AA 07/23/2022 >60     Calcium 07/23/2022 8.8     Bilirubin, total 07/23/2022 0.6     ALT (SGPT) 07/23/2022 31     AST (SGOT) 07/23/2022 22     Alk. phosphatase 07/23/2022 63     Protein, total 07/23/2022 7.4     Albumin 07/23/2022 3.4 (A)    Globulin 07/23/2022 4.0     A-G Ratio 07/23/2022 0.9 (A)    SAMPLES BEING HELD 07/23/2022 1RED     COMMENT 07/23/2022 Add-on orders for these samples will be processed based on acceptable specimen integrity and analyte stability, which may vary by analyte. Glucose (POC) 07/23/2022 109     Performed by 07/23/2022 Abena Isabella     Glucose (POC) 07/24/2022 126 (A)    Performed by 07/24/2022 PETER Rodgers     Aspirin test 07/24/2022 387     P2Y12 Plt response 07/24/2022 191 (A)       IMAGING:  XR SHOULDER RT AP/LAT MIN 2 V    Result Date: 7/24/2022  No acute abnormality. CT HEAD WO CONT    Result Date: 7/24/2022  No significant interval change. CT PERF W CBF    Result Date: 7/25/2022  No significant cerebral perfusion abnormality     XR CHEST PORT    Result Date: 7/22/2022  No acute cardiopulmonary process. CTA CODE NEURO HEAD AND NECK W CONT    Result Date: 7/23/2022  Stable exam without evidence for acute large vessel arterial occlusion. No significant cerebral perfusion abnormality or mismatch. CTA CODE NEURO HEAD AND NECK W CONT    Result Date: 7/22/2022  1. No large vessel occlusion or hemodynamically significant carotid stenosis. 2.  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  3.  No perfusion abnormality. CT CODE NEURO HEAD WO CONTRAST    Result Date: 7/23/2022  1. No intracranial hemorrhage. 2. Subtle CT evidence of an evolving acute left corona radiata infarct, in correlation with recent prior MRI. 3. No other significant change. CT CODE NEURO HEAD WO CONTRAST    Result Date: 7/22/2022  No significant interval change. No evidence of acute process. CT CODE NEURO PERF W CBF    Result Date: 7/23/2022  Stable exam without evidence for acute large vessel arterial occlusion. No significant cerebral perfusion abnormality or mismatch. CT CODE NEURO PERF W CBF    Result Date: 7/22/2022  1. No large vessel occlusion or hemodynamically significant carotid stenosis. 2.  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 3.  No perfusion abnormality. EKG:  No results found for this or any previous visit.           Jacquie Go MD  7/26/2022 8:41 PM

## 2022-07-26 NOTE — PROGRESS NOTES
Problem: Patient Education: Go to Patient Education Activity  Goal: Patient/Family Education  Outcome: Progressing Towards Goal     Problem: Falls - Risk of  Goal: *Absence of Falls  Description: Document [place] Fall Risk and appropriate interventions in the flowsheet.   Outcome: Progressing Towards Goal  Note: Fall Risk Interventions:  Mobility Interventions: Communicate number of staff needed for ambulation/transfer, Bed/chair exit alarm, Patient to call before getting OOB    Mentation Interventions: Bed/chair exit alarm, Family/sitter at bedside, Increase mobility, More frequent rounding, Reorient patient    Medication Interventions: Bed/chair exit alarm, Evaluate medications/consider consulting pharmacy, Patient to call before getting OOB, Teach patient to arise slowly    Elimination Interventions: Bed/chair exit alarm, Call light in reach, Patient to call for help with toileting needs, Stay With Me (per policy)              Problem: Patient Education: Go to Patient Education Activity  Goal: Patient/Family Education  Outcome: Progressing Towards Goal     Problem: TIA/CVA Stroke: 0-24 hours  Goal: Off Pathway (Use only if patient is Off Pathway)  Outcome: Progressing Towards Goal  Goal: Activity/Safety  Outcome: Progressing Towards Goal  Goal: Consults, if ordered  Outcome: Progressing Towards Goal  Goal: Diagnostic Test/Procedures  Outcome: Progressing Towards Goal  Goal: Nutrition/Diet  Outcome: Progressing Towards Goal  Goal: Discharge Planning  Outcome: Progressing Towards Goal  Goal: Medications  Outcome: Progressing Towards Goal  Goal: Respiratory  Outcome: Progressing Towards Goal  Goal: Treatments/Interventions/Procedures  Outcome: Progressing Towards Goal  Goal: Minimize risk of bleeding post-thrombolytic infusion  Outcome: Progressing Towards Goal  Goal: Monitor for complications post-thrombolytic infusion  Outcome: Progressing Towards Goal  Goal: Psychosocial  Outcome: Progressing Towards Goal  Goal: *Hemodynamically stable  Outcome: Progressing Towards Goal  Goal: *Neurologically stable  Description: Absence of additional neurological deficits    Outcome: Progressing Towards Goal  Goal: *Verbalizes anxiety and depression are reduced or absent  Outcome: Progressing Towards Goal  Goal: *Absence of Signs of Aspiration on Current Diet  Outcome: Progressing Towards Goal  Goal: *Absence of deep venous thrombosis signs and symptoms(Stroke Metric)  Outcome: Progressing Towards Goal  Goal: *Ability to perform ADLs and demonstrates progressive mobility and function  Outcome: Progressing Towards Goal  Goal: *Stroke education started(Stroke Metric)  Outcome: Progressing Towards Goal  Goal: *Dysphagia screen performed(Stroke Metric)  Outcome: Progressing Towards Goal  Goal: *Rehab consulted(Stroke Metric)  Outcome: Progressing Towards Goal     Problem: Patient Education: Go to Patient Education Activity  Goal: Patient/Family Education  Outcome: Progressing Towards Goal     Problem: Patient Education: Go to Patient Education Activity  Goal: Patient/Family Education  Outcome: Progressing Towards Goal     Problem: Patient Education: Go to Patient Education Activity  Goal: Patient/Family Education  Outcome: Progressing Towards Goal

## 2022-07-26 NOTE — PROGRESS NOTES
Problem: Self Care Deficits Care Plan (Adult)  Goal: *Acute Goals and Plan of Care (Insert Text)  Description: FUNCTIONAL STATUS PRIOR TO ADMISSION: Patient was independent with ADLs, IADLs, and functional mobility/ ambulatory without AD. Note history of left-sided craniotomy with underlying encephalomalacia per imaging, but patient and wife confirmed he was still independent PTA with no R hemiparesis. Working as a , delivering soda. HOME SUPPORT: Patient resided with his wife and did not require assistance    Occupational Therapy Goals  Initiated 7/25/2022  1. Patient will perform grooming standing at sink with contact guard assist within 7 day(s). 2.  Patient will perform bathing with minimal assistance within 7 day(s). 3.  Patient will perform upper body dressing with minimal assistance within 7 day(s). 4.  Patient will perform lower body dressing with moderate assistance within 7 days. 5.  Patient will perform toilet transfers with minimal assistance within 7 day(s). 6.  Patient will perform all aspects of toileting with moderate assistance  within 7 day(s). 7.  Patient will participate in upper extremity therapeutic exercise/activities with minimal assistance for 10 minutes within 7 day(s). 8.  Patient will utilize fall prevention activities with verbal cues within 7 day(s). 9.  Patient will improve their Fugl Mathews score by 5 points in prep for ADLs within 7 days. Outcome: Progressing Towards Goal     OCCUPATIONAL THERAPY TREATMENT  Patient: Sherry Elliott (57 y.o. male)  Date: 7/26/2022  Diagnosis: CVA (cerebral vascular accident) [facility]) [I63.9] <principal problem not specified>      Precautions: Fall  Chart, occupational therapy assessment, plan of care, and goals were reviewed.     ASSESSMENT  Patient continues to present with moderate-severe R hemiparesis affecting UE>LE (RUE overall 3- to 3+/5, very limited function), impaired balance, dysarthria, R lean, confusion, disorientation, decreased insight into deficits, impaired safety awareness, and mild-moderate neglect to R body and environment s/p admission for CVA with MRI positive for L corona radiata infarct. Patient required increased help for supine-sit today but then progressed to ambulate to/ from bathroom with modAx2 for standing grooming at sink with modA. Patient required less cuing to attend to and to integrate RUE in functional tasks. Performed RUE neuromuscular facilitory exercises seated with AAROM. At baseline prior to acute CVA patient was independent, RUE dominant, and working as a . He is significantly below independent functional baseline and would benefit from inpatient rehab at d/c. Current Level of Function Impacting Discharge (ADLs/self-care): minimum to maximum assistance UB ADLs, total assistance LB ADLs, minimum assistance sit-stand, moderate assistance to pivot         PLAN :  Patient continues to benefit from skilled intervention to address the above impairments. Continue treatment per established plan of care to address goals. Recommendation for discharge: (in order for the patient to meet his/her long term goals)  Therapy 3 hours per day 5-7 days per week    This discharge recommendation:  Has been made in collaboration with the attending provider and/or case management       SUBJECTIVE:   Patient stated I'm okay.     OBJECTIVE DATA SUMMARY:   Cognitive/Behavioral Status:  Neurologic State: Alert  Orientation Level: Oriented to person;Oriented to place;Oriented to situation;Disoriented to time  Cognition: Follows commands  Perception: Cues to attend right visual field;Cues to attend to right side of body          Functional Mobility and Transfers for ADLs:  Bed Mobility:  Supine to Sit: Maximum assistance;Assist x1    Transfers:  Sit to Stand:  Moderate assistance     Bed to Chair: Moderate assistance    Balance:  Sitting: Impaired  Sitting - Static: Good (unsupported)  Sitting - Dynamic: Fair (occasional)  Standing: Impaired  Standing - Static: Poor;Constant support  Standing - Dynamic : Poor;Constant support    ADL Intervention:       Grooming  Washing Hands: Moderate assistance (standing at sink, steayding + proximal support of RUE)         Type of Bath: Chlorhexidine (CHG); Full          Pain:  Patient did not report pain    Activity Tolerance:   Good    After treatment patient left in no apparent distress:   Sitting in chair, Call bell within reach, Bed / chair alarm activated, and Caregiver / family present    COMMUNICATION/COLLABORATION:   The patients plan of care was discussed with: Physical therapist and Registered nurse. Patient was educated regarding His deficit(s) of R hemiparesis, impaired balance as this relates to His diagnosis of CVA. He demonstrated fair understanding as evidenced by verbal response. Patient and/or family was verbally educated on the BE FAST acronym for signs/symptoms of CVA and TIA. BE FAST was written on patient's communication board  for visual education and reinforcement. All questions answered with patient indicating fair understanding.      Sanam Duque OT  Time Calculation: 31 mins

## 2022-07-26 NOTE — PROGRESS NOTES
Problem: Mobility Impaired (Adult and Pediatric)  Goal: *Acute Goals and Plan of Care (Insert Text)  Description:   FUNCTIONAL STATUS PRIOR TO ADMISSION: Patient was independent and active without use of DME, worked as a  delivering drinks. HOME SUPPORT PRIOR TO ADMISSION: The patient lived with his wife but did not require assist.    Physical Therapy Goals  Revised 7/25/2022  1. Patient will move from supine to sit and sit to supine  in bed with supervision/set-up within 7 day(s). 2.  Patient will transfer from bed to chair and chair to bed with supervision/set-up using the least restrictive device within 7 day(s). 3.  Patient will perform sit to stand with supervision/set-up within 7 day(s). 4.  Patient will ambulate with minimal assistance/contact guard assist for 50 feet with the least restrictive device within 7 day(s). 5.  Patient will ascend/descend 12 stairs with one handrail(s) with minimal assistance/contact guard assist within 7 day(s). 6.  Patient will improve Hill Balance score by 7 points within 7 days. Initiated 7/23/2022  1. Patient will move from supine to sit and sit to supine  in bed with independence within 7 day(s). 2.  Patient will transfer from bed to chair and chair to bed with independence using the least restrictive device within 7 day(s). 3.  Patient will perform sit to stand with independence within 7 day(s). 4.  Patient will ambulate with independence for 500 feet with the least restrictive device within 7 day(s). 5.  Patient will ascend/descend 12 stairs with handrail(s) with modified independence within 7 day(s). 6.  Patient will improve Hill Balance score by 7 points within 7 days.   Outcome: Progressing Towards Goal  PHYSICAL THERAPY TREATMENT  Patient: Maxim South (57 y.o. male)  Date: 7/26/2022  Diagnosis: CVA (cerebral vascular accident) [facility]) [I63.9] <principal problem not specified>      Precautions: Fall  Chart, physical therapy assessment, plan of care and goals were reviewed. ASSESSMENT  Patient continues with skilled PT services and is progressing towards goals. Pt presents with R hemiparesis UE>LE (R hip flexion 2/5, knee extension 4/5 with formal testing), mild R inattention, poor balance (R lean), impaired motor planning/sequencing, dysarthria, decreased safety awareness, decreased insight into deficits, impaired cognition, unsteady gait, decreased activity tolerance, and overall decline compared to his independent baseline function. Despite improved formal strength testing, pt required maxA for supine>sit transfer, modA x2 for sit<>stand transfer, and modA x2 for ambulating short distance. During ambulation, pt demonstrated strong R lean and required assistance for weight shifting toward L and advancing RLE. Pt ended session in chair and was re-educated on BE FAST acronym for signs/symptoms of stroke and provided a handout. At baseline prior to acute CVA patient was independent, RUE dominant, and working as a . He is significantly below independent functional baseline and would benefit from inpatient rehab at d/c. Current Level of Function Impacting Discharge (mobility/balance): maxA for supine>sit, modA x2 for sit<>stand and for ambulation     Other factors to consider for discharge: independent baseline, fall risk, acute CVA         PLAN :  Patient continues to benefit from skilled intervention to address the above impairments. Continue treatment per established plan of care. to address goals. Recommendation for discharge: (in order for the patient to meet his/her long term goals)  Therapy 3 hours per day 5-7 days per week    This discharge recommendation:  Has not yet been discussed the attending provider and/or case management    IF patient discharges home will need the following DME: to be determined (TBD)       SUBJECTIVE:   Patient stated I can walk that way.     OBJECTIVE DATA SUMMARY:   Critical Behavior:  Neurologic State: Alert  Orientation Level: Oriented to person, Oriented to place, Oriented to situation, Disoriented to time  Cognition: Follows commands  Safety/Judgement: Awareness of environment, Insight into deficits  Functional Mobility Training:  Bed Mobility:  Supine to Sit: Maximum assistance;Assist x1    Transfers:  Sit to Stand: Moderate assistance  Stand to Sit: Moderate assistance  Bed to Chair: Moderate assistance    Balance:  Sitting: Impaired  Sitting - Static: Good (unsupported)  Sitting - Dynamic: Fair (occasional)  Standing: Impaired  Standing - Static: Poor;Constant support  Standing - Dynamic : Poor;Constant support    Ambulation/Gait Training:  Distance (ft): 30 Feet (ft)  Assistive Device: Gait belt (bilat HHA)  Ambulation - Level of Assistance: Moderate assistance;Assist x2  Gait Abnormalities: Decreased step clearance;Shuffling gait;Trunk sway increased; Path deviations; Step to gait  Base of Support: Shift to right;Center of gravity altered  Stance: Right decreased  Speed/Jennifer: Slow;Shuffled  Step Length: Right shortened;Left shortened  Swing Pattern: Right asymmetrical    Activity Tolerance:   Fair    After treatment patient left in no apparent distress:   Sitting in chair, Call bell within reach, Bed / chair alarm activated, and Caregiver / family present    COMMUNICATION/COLLABORATION:   The patients plan of care was discussed with: Occupational therapist and Registered nurse.      June Argueta PT, DPT   Time Calculation: 30 mins

## 2022-07-26 NOTE — PROGRESS NOTES
Transition of Care Plan: [id number] auth-started 07/26/2022     RUR: 9% low     PCP F/U: Yamila Bronson     Disposition: IPR     Transportation: family     Main Contact: Astrid Daly - Spouse - [phone]    2589: EMR showing self pay. However, spoke with patient and spouse and his is insured through his employer and has [facility] [address] Spouse to bring card tonight for copy to be placed in chart. Left message from [facility] liaison to see if he has been accepted yet. 1507: Liaison states that patient has been approved and will start insurance auth. 1543: Liaison could not verify insurance. States information provided is for dental only.  Wife to bring in a copy of the card    Kiesha Winter RN, CRM

## 2022-07-26 NOTE — PROGRESS NOTES
Pt fell at approximately [phone] in [address] was in pt's room outside patient door. Pt had been notified to pull the red emergency cord before standing up. Unsure if pt attempted to pull the cord or not, but apparently he tried to get up on his own. Pt was assisted back to bed. Pt denies pain. Pt denies hitting his head. Pt's VSS. Only injury noted is a scrape on his right flank. No active bleeding. This RN was not notified until [phone] of this event. Charge RN Kit Ades was notified at the time of the incident. Dr. Latrell Bryson was notified via perfect serve at 3681. According to perfect serve, message was read by Dr. [place] Thapa at 3521. Will continue to monitor patient. Pt is currently A&Ox3.

## 2022-07-26 NOTE — PROGRESS NOTES
Post Fall Documentation      Bill Daly witnessed/unwitnessed fall occurred on 7/26/22 (Date) at 18 (Time). The answers to the following questions summarize the fall: In the patient's own words,:  What were you attempting to do when you fell? Trying to get off the toilet  Do you know why you fell? Trying to get up  Do you have any pain/discomfort or any other complaints?  no  Which part of your body made contact with the floor or other object? bottom    Nurse:  Was this an assisted fall? no  Was fall witnessed? No  If witnessed, what part of the body made contact with the floor or other object? N/a  Patients mental status after the fall/when found: Alert and oriented  Any apparent injury:  Abrasion(s)  Immediate interventions for injury/suspected injury? No interventions needed  Patient assisted back to bed? Assist X2  Name of provider notified and time, any comments? Julia Marsh       Name of family member notified and time: Nephew- in room at time of fall      Immediate VS and physical assessment documented in flow sheets. Neuro assessment every hour x 4 (for potential head injury or unwitnessed fall) documented in flow sheets.       Hyun Rodney

## 2022-07-26 NOTE — PROGRESS NOTES
Care Plan reviewed and all interventions implemented. Problem: Patient Education: Go to Patient Education Activity  Goal: Patient/Family Education  Outcome: Progressing Towards Goal     Problem: Falls - Risk of  Goal: *Absence of Falls  Description: Document [place] Fall Risk and appropriate interventions in the flowsheet.   Outcome: Progressing Towards Goal  Note: Fall Risk Interventions:  Mobility Interventions: Patient to call before getting OOB, PT Consult for mobility concerns, Utilize walker, cane, or other assistive device    Mentation Interventions: Adequate sleep, hydration, pain control, Bed/chair exit alarm, Increase mobility, Reorient patient, Update white board    Medication Interventions: Bed/chair exit alarm, Patient to call before getting OOB, Teach patient to arise slowly    Elimination Interventions: Bed/chair exit alarm, Call light in reach, Toileting schedule/hourly rounds, Urinal in reach              Problem: Patient Education: Go to Patient Education Activity  Goal: Patient/Family Education  Outcome: Progressing Towards Goal     Problem: Patient Education: Go to Patient Education Activity  Goal: Patient/Family Education  Outcome: Progressing Towards Goal     Problem: TIA/CVA Stroke: 0-24 hours  Goal: Off Pathway (Use only if patient is Off Pathway)  Outcome: Progressing Towards Goal  Goal: Activity/Safety  Outcome: Progressing Towards Goal  Goal: Consults, if ordered  Outcome: Progressing Towards Goal  Goal: Diagnostic Test/Procedures  Outcome: Progressing Towards Goal  Goal: Nutrition/Diet  Outcome: Progressing Towards Goal  Goal: Discharge Planning  Outcome: Progressing Towards Goal  Goal: Medications  Outcome: Progressing Towards Goal  Goal: Respiratory  Outcome: Progressing Towards Goal  Goal: Treatments/Interventions/Procedures  Outcome: Progressing Towards Goal  Goal: Minimize risk of bleeding post-thrombolytic infusion  Outcome: Progressing Towards Goal  Goal: Monitor for complications post-thrombolytic infusion  Outcome: Progressing Towards Goal  Goal: Psychosocial  Outcome: Progressing Towards Goal  Goal: *Hemodynamically stable  Outcome: Progressing Towards Goal  Goal: *Neurologically stable  Description: Absence of additional neurological deficits    Outcome: Progressing Towards Goal  Goal: *Verbalizes anxiety and depression are reduced or absent  Outcome: Progressing Towards Goal  Goal: *Absence of Signs of Aspiration on Current Diet  Outcome: Progressing Towards Goal  Goal: *Absence of deep venous thrombosis signs and symptoms(Stroke Metric)  Outcome: Progressing Towards Goal  Goal: *Ability to perform ADLs and demonstrates progressive mobility and function  Outcome: Progressing Towards Goal  Goal: *Stroke education started(Stroke Metric)  Outcome: Progressing Towards Goal  Goal: *Dysphagia screen performed(Stroke Metric)  Outcome: Progressing Towards Goal  Goal: *Rehab consulted(Stroke Metric)  Outcome: Progressing Towards Goal     Problem: TIA/CVA Stroke: Day 2 Until Discharge  Goal: Off Pathway (Use only if patient is Off Pathway)  Outcome: Progressing Towards Goal  Goal: Activity/Safety  Outcome: Progressing Towards Goal  Goal: Diagnostic Test/Procedures  Outcome: Progressing Towards Goal  Goal: Nutrition/Diet  Outcome: Progressing Towards Goal  Goal: Discharge Planning  Outcome: Progressing Towards Goal  Goal: Medications  Outcome: Progressing Towards Goal  Goal: Respiratory  Outcome: Progressing Towards Goal  Goal: Treatments/Interventions/Procedures  Outcome: Progressing Towards Goal  Goal: Psychosocial  Outcome: Progressing Towards Goal  Goal: *Verbalizes anxiety and depression are reduced or absent  Outcome: Progressing Towards Goal  Goal: *Absence of aspiration  Outcome: Progressing Towards Goal  Goal: *Absence of deep venous thrombosis signs and symptoms(Stroke Metric)  Outcome: Progressing Towards Goal  Goal: *Optimal pain control at patient's stated goal  Outcome: Progressing Towards Goal  Goal: *Tolerating diet  Outcome: Progressing Towards Goal  Goal: *Ability to perform ADLs and demonstrates progressive mobility and function  Outcome: Progressing Towards Goal  Goal: *Stroke education continued(Stroke Metric)  Outcome: Progressing Towards Goal     Problem: Ischemic Stroke: Discharge Outcomes  Goal: *Verbalizes anxiety and depression are reduced or absent  Outcome: Progressing Towards Goal  Goal: *Verbalize understanding of risk factor modification(Stroke Metric)  Outcome: Progressing Towards Goal  Goal: *Hemodynamically stable  Outcome: Progressing Towards Goal  Goal: *Absence of aspiration pneumonia  Outcome: Progressing Towards Goal  Goal: *Aware of needed dietary changes  Outcome: Progressing Towards Goal  Goal: *Verbalize understanding of prescribed medications including anti-coagulants, anti-lipid, and/or anti-platelets(Stroke Metric)  Outcome: Progressing Towards Goal  Goal: *Tolerating diet  Outcome: Progressing Towards Goal  Goal: *Aware of follow-up diagnostics related to anticoagulants  Outcome: Progressing Towards Goal  Goal: *Ability to perform ADLs and demonstrates progressive mobility and function  Outcome: Progressing Towards Goal  Goal: *Absence of DVT(Stroke Metric)  Outcome: Progressing Towards Goal  Goal: *Absence of aspiration  Outcome: Progressing Towards Goal  Goal: *Optimal pain control at patient's stated goal  Outcome: Progressing Towards Goal  Goal: *Home safety concerns addressed  Outcome: Progressing Towards Goal  Goal: *Describes available resources and support systems  Outcome: Progressing Towards Goal  Goal: *Verbalizes understanding of activation of EMS(911) for stroke symptoms(Stroke Metric)  Outcome: Progressing Towards Goal  Goal: *Understands and describes signs and symptoms to report to providers(Stroke Metric)  Outcome: Progressing Towards Goal  Goal: *Neurolgocially stable (absence of additional neurological deficits)  Outcome: Progressing Towards Goal  Goal: *Verbalizes importance of follow-up with primary care physician(Stroke Metric)  Outcome: Progressing Towards Goal  Goal: *Smoking cessation discussed,if applicable(Stroke Metric)  Outcome: Progressing Towards Goal  Goal: *Depression screening completed(Stroke Metric)  Outcome: Progressing Towards Goal     Problem: Patient Education: Go to Patient Education Activity  Goal: Patient/Family Education  Outcome: Progressing Towards Goal     Problem: Patient Education: Go to Patient Education Activity  Goal: Patient/Family Education  Outcome: Progressing Towards Goal

## 2022-07-27 LAB
FLUAV RNA SPEC QL NAA+PROBE: NOT DETECTED
FLUBV RNA SPEC QL NAA+PROBE: NOT DETECTED
SARS-COV-2, COV2: NOT DETECTED

## 2022-07-27 PROCEDURE — 97535 SELF CARE MNGMENT TRAINING: CPT

## 2022-07-27 PROCEDURE — 74011250637 HC RX REV CODE- 250/637: Performed by: NURSE PRACTITIONER

## 2022-07-27 PROCEDURE — 97112 NEUROMUSCULAR REEDUCATION: CPT

## 2022-07-27 PROCEDURE — 74011000250 HC RX REV CODE- 250: Performed by: NURSE PRACTITIONER

## 2022-07-27 PROCEDURE — 87636 SARSCOV2 & INF A&B AMP PRB: CPT

## 2022-07-27 PROCEDURE — 97530 THERAPEUTIC ACTIVITIES: CPT

## 2022-07-27 PROCEDURE — 74011250637 HC RX REV CODE- 250/637: Performed by: STUDENT IN AN ORGANIZED HEALTH CARE EDUCATION/TRAINING PROGRAM

## 2022-07-27 PROCEDURE — 74011250637 HC RX REV CODE- 250/637: Performed by: PSYCHIATRY & NEUROLOGY

## 2022-07-27 PROCEDURE — 97116 GAIT TRAINING THERAPY: CPT

## 2022-07-27 PROCEDURE — 65270000046 HC RM TELEMETRY

## 2022-07-27 RX ADMIN — ASPIRIN 81 MG CHEWABLE TABLET 81 MG: 81 TABLET CHEWABLE at 08:49

## 2022-07-27 RX ADMIN — CLOPIDOGREL BISULFATE 75 MG: 75 TABLET ORAL at 08:49

## 2022-07-27 RX ADMIN — ACETAMINOPHEN 650 MG: 325 TABLET ORAL at 08:49

## 2022-07-27 RX ADMIN — CYANOCOBALAMIN TAB 500 MCG 500 MCG: 500 TAB at 08:49

## 2022-07-27 RX ADMIN — ATORVASTATIN CALCIUM 40 MG: 40 TABLET, FILM COATED ORAL at 08:49

## 2022-07-27 NOTE — PROGRESS NOTES
[facility] Hospitalist Service Progress Note    INTERVAL HISTORY  / Subjective:    Assessment / Plan:  Has continued LUE Weakness > LLE Weakness, again otherwise no complaints, he experienced a fall yesterday but has no complaint of any painful symptoms     ACute CVA with right-sided hemiparesis  --07/23 Small focal acute infarct at the left corona radiata. Left-sided craniotomy changes and underlying encephalomalacia/postsurgical  change. -- Consulted, he has been started on aspirin, Plavix, high intensity statin, and Keppra for concern for seizure-like activity -- ETA did not show any large vessel occlusion, EEG pending, 2D echo pending  --07/24 ARU, PRU with expected antiplatelet effect, resumed on ASA  Plavix, statin , and keppra, ECHO pending   --07/25 ECHO perfromed no LV Throbus or PFO, Normal EF,   --Resume on ASA + Plavix + Atorvastatin   --07/26 and 27 Pending In patient rehab placement     Traumatic brain injury  - Subdural hemorrhage requiring left-sided craniotomy, with underlying encephalomalacia          Chief Complaint : Cerebrovascular Accident        Objective:  Visit Vitals  BP (!) 130/93 (BP 1 Location: Left upper arm, BP Patient Position: At rest)   Pulse 83   Temp 98 °F (36.7 °C)   Resp 15   Ht 5' 8\" (1.727 m)   Wt 86.2 kg (190 lb 0.6 oz)   SpO2 96%   BMI 28.89 kg/m²                 Physical Exam:  General: No acute distress, speaking in full sentences, no use of accessory muscles   HEENT: Pupils equal and reactive to light and accommodation, oropharynx is clear   Neck: Supple, no lymphadenopathy, no JVD   Lungs: Clear to auscultation bilaterally   Cardiovascular: Regular rate and rhythm with normal S1 and S2   Abdomen: Soft, nontender, nondistended, normoactive bowel sounds   Extremities: No cyanosis clubbing or edema   Neuro: Facial droop, right upper and lower extremity weakness , A&O x3   Psych: Normal affect     Intake and Output:  Date 07/26/22 0700 - 07/27/22 0659 07/27/22 0700 - 07/28/22 515 28 3/4 [facility] [id number] 24 Hour Total 4212-55565 3964-3205 24 Hour Total   INTAKE   Shift Total(mL/kg)         OUTPUT   Urine(mL/kg/hr) 350(0.3) 400(0.4) 750(0.4)        Urine Voided 350 400 750      Shift Total(mL/kg) 350(4.1) 400(4.6) 750(8.7)      NET -350 -400 -750      Weight (kg) 86.2 86.2 86.2 86.2 86.2 86.2         LAB:  Admission on 07/22/2022   Component Date Value    Ventricular Rate 07/22/2022 52     Atrial Rate 07/22/2022 52     P-R Interval 07/22/2022 168     QRS Duration 07/22/2022 100     Q-T Interval 07/22/2022 440     QTC Calculation (Bezet) 07/22/2022 409     Calculated P Axis 07/22/2022 48     Calculated R Axis 07/22/2022 50     Calculated T Axis 07/22/2022 2     Diagnosis 07/22/2022                      Value:Sinus bradycardia  Minimal voltage criteria for LVH, may be normal variant ( Sokolow-Downing )  Nonspecific T wave abnormality  Abnormal ECG  When compared with ECG of 31-OCT-2019 19:06,  Nonspecific T wave abnormality now evident in Lateral leads  Confirmed by Karina Dailey ([id number]) on 7/23/2022 3:01:03 PM      WBC 07/22/2022 5.7     RBC 07/22/2022 4.32     HGB 07/22/2022 14.5     HCT 07/22/2022 42.3     MCV 07/22/2022 97.9     MCH 07/22/2022 33.6     MCHC 07/22/2022 34.3     RDW 07/22/2022 12.3     PLATELET 13/95/9424 571     MPV 07/22/2022 9.3     NRBC 07/22/2022 0.0     ABSOLUTE NRBC 07/22/2022 0.00     NEUTROPHILS 07/22/2022 42     LYMPHOCYTES 07/22/2022 45     MONOCYTES 07/22/2022 11     EOSINOPHILS 07/22/2022 1     BASOPHILS 07/22/2022 1     IMMATURE GRANULOCYTES 07/22/2022 0     ABS. NEUTROPHILS 07/22/2022 2.4     ABS. LYMPHOCYTES 07/22/2022 2.5     ABS. MONOCYTES 07/22/2022 0.7     ABS. EOSINOPHILS 07/22/2022 0.1     ABS. BASOPHILS 07/22/2022 0.1     ABS. IMM.  GRANS. 07/22/2022 0.0     DF 07/22/2022 AUTOMATED     Sodium 07/22/2022 139     Potassium 07/22/2022 3.8     Chloride 07/22/2022 107     CO2 07/22/2022 24     Anion gap 07/22/2022 8     Glucose 07/22/2022 77     BUN 07/22/2022 13     Creatinine 07/22/2022 0.94     BUN/Creatinine ratio 07/22/2022 14     GFR est AA 07/22/2022 >60     GFR est non-AA 07/22/2022 >60     Calcium 07/22/2022 9.3     Bilirubin, total 07/22/2022 0.5     ALT (SGPT) 07/22/2022 33     AST (SGOT) 07/22/2022 22     Alk. phosphatase 07/22/2022 68     Protein, total 07/22/2022 8.0     Albumin 07/22/2022 4.0     Globulin 07/22/2022 4.0     A-G Ratio 07/22/2022 1.0 (A)    INR 07/22/2022 1.1     Prothrombin time 07/22/2022 11.8 (A)    Troponin-High Sensitivity 07/22/2022 4     Glucose (POC) 07/22/2022 73     Performed by 07/22/2022 Ami Eri     ALCOHOL(ETHYL),SERUM 07/22/2022 90 (A)    Magnesium 07/22/2022 2.7 (A)    Phosphorus 07/22/2022 3.8     AMPHETAMINES 07/22/2022 Negative     BARBITURATES 07/22/2022 Negative     BENZODIAZEPINES 07/22/2022 Negative     COCAINE 07/22/2022 Negative     METHADONE 07/22/2022 Negative     OPIATES 07/22/2022 Negative     PCP(PHENCYCLIDINE) 07/22/2022 Negative     THC (TH-CANNABINOL) 07/22/2022 Negative     Drug screen comment 07/22/2022 (NOTE)     Ammonia, plasma 07/22/2022 23     Color 07/22/2022 YELLOW/STRAW     Appearance 07/22/2022 CLEAR     pH (UA) 07/22/2022 5.5     Protein 07/22/2022 Negative     Glucose 07/22/2022 Negative     Ketone 07/22/2022 Negative     Bilirubin 07/22/2022 Negative     Blood 07/22/2022 Negative     Urobilinogen 07/22/2022 0.2     Nitrites 07/22/2022 Negative     Leukocyte Esterase 07/22/2022 Negative     WBC 07/22/2022 0-4     RBC 07/22/2022 0-5     Epithelial cells 07/22/2022 FEW     Bacteria 07/22/2022 Negative     UA:UC IF INDICATED 07/22/2022 CULTURE NOT INDICATED BY UA RESULT     Vitamin B12 07/22/2022 277     TSH 07/22/2022 2.29     Levetiracetam (Keppra) 07/22/2022 <1.0 (A)    SAMPLES BEING HELD 07/22/2022 1PST     COMMENT 07/22/2022 Add-on orders for these samples will be processed based on acceptable specimen integrity and analyte stability, which may vary by analyte.      IVSd 07/25/2022 1.1 (A)    LVIDd 07/25/2022 4.0 (A)    LVIDs 07/25/2022 2.8     LVPWd 07/25/2022 1.1 (A)    LVOT Peak Gradient 07/25/2022 3     LVOT Peak Velocity 07/25/2022 0.8     RVSP 07/25/2022 22     RV Free Wall Peak S' 07/25/2022 12     LA Diameter 07/25/2022 3.4     LA Volume A/L 07/25/2022 31     LA Volume 2C 07/25/2022 33     LA Volume 4C 07/25/2022 29     Est. RA Pressure 07/25/2022 3     AV Peak Gradient 07/25/2022 3     AV Peak Velocity 07/25/2022 0.9     MV E Velocity 07/25/2022 0.58     LV E' Lateral Velocity 07/25/2022 11     LV E' Septal Velocity 07/25/2022 9     MR Peak Gradient 07/25/2022 18     MR Peak Velocity 07/25/2022 2.1     Pulmonary Artery EDP 07/25/2022 18     PV Peak Gradient 07/25/2022 4     PV Max Velocity 07/25/2022 1.0     TAPSE 07/25/2022 1.9     TR Peak Gradient 07/25/2022 19     TR Max Velocity 07/25/2022 2.19     Aortic Root 07/25/2022 3.5     Fractional Shortening 2D 07/25/2022 30     LVIDd Index 07/25/2022 2.00     LVIDs Index 07/25/2022 1.40     LV RWT Ratio 07/25/2022 0.55     LV Mass 2D 07/25/2022 145.6     LV Mass 2D Index 07/25/2022 72.8     E/E' Ratio (Averaged) 07/25/2022 5.86     E/E' Lateral 07/25/2022 5.27     E/E' Septal 07/25/2022 6.44     LA Volume Index A/L 07/25/2022 16     LA Volume Index 2C 07/25/2022 17     LA Volume Index 4C 07/25/2022 15 (A)    LA Size Index 07/25/2022 1.70     LA/AO Root Ratio 07/25/2022 0.97     Ao Root Index 07/25/2022 1.75     AV Velocity Ratio 07/25/2022 0.89     Cholesterol, total 07/23/2022 142     Triglyceride 07/23/2022 49     HDL Cholesterol 07/23/2022 55     LDL, calculated 07/23/2022 77.2     VLDL, calculated 07/23/2022 9.8     CHOL/HDL Ratio 07/23/2022 2.6     Hemoglobin A1c 07/23/2022 5.5     Est. average glucose 07/23/2022 111     WBC 07/23/2022 6.1     RBC 07/23/2022 3.95 (A)    HGB 07/23/2022 13.3     HCT 07/23/2022 39.3     MCV 07/23/2022 99.5 (A)    MCH 07/23/2022 33.7     MCHC 07/23/2022 33.8     RDW 07/23/2022 12.4 PLATELET 89/67/2556 494     MPV 07/23/2022 9.5     NRBC 07/23/2022 0.0     ABSOLUTE NRBC 07/23/2022 0.00     Magnesium 07/23/2022 2.6 (A)    Sodium 07/23/2022 139     Potassium 07/23/2022 4.4     Chloride 07/23/2022 109 (A)    CO2 07/23/2022 28     Anion gap 07/23/2022 2 (A)    Glucose 07/23/2022 76     BUN 07/23/2022 14     Creatinine 07/23/2022 0.97     BUN/Creatinine ratio 07/23/2022 14     GFR est AA 07/23/2022 >60     GFR est non-AA 07/23/2022 >60     Calcium 07/23/2022 8.8     Bilirubin, total 07/23/2022 0.6     ALT (SGPT) 07/23/2022 31     AST (SGOT) 07/23/2022 22     Alk. phosphatase 07/23/2022 63     Protein, total 07/23/2022 7.4     Albumin 07/23/2022 3.4 (A)    Globulin 07/23/2022 4.0     A-G Ratio 07/23/2022 0.9 (A)    SAMPLES BEING HELD 07/23/2022 1RED     COMMENT 07/23/2022 Add-on orders for these samples will be processed based on acceptable specimen integrity and analyte stability, which may vary by analyte. Glucose (POC) 07/23/2022 109     Performed by 07/23/2022 Kailey Barriga     Glucose (POC) 07/24/2022 126 (A)    Performed by 07/24/2022 PETER Rodgers     Aspirin test 07/24/2022 387     P2Y12 Plt response 07/24/2022 191 (A)    SARS-CoV-2 by PCR 07/27/2022 Not detected     Influenza A by PCR 07/27/2022 Not detected     Influenza B by PCR 07/27/2022 Not detected        IMAGING:  XR SHOULDER RT AP/LAT MIN 2 V    Result Date: 7/24/2022  No acute abnormality. CT HEAD WO CONT    Result Date: 7/24/2022  No significant interval change. CT PERF W CBF    Result Date: 7/25/2022  No significant cerebral perfusion abnormality     XR CHEST PORT    Result Date: 7/22/2022  No acute cardiopulmonary process. CTA CODE NEURO HEAD AND NECK W CONT    Result Date: 7/23/2022  Stable exam without evidence for acute large vessel arterial occlusion. No significant cerebral perfusion abnormality or mismatch. CTA CODE NEURO HEAD AND NECK W CONT    Result Date: 7/22/2022  1.   No large vessel occlusion or hemodynamically significant carotid stenosis. 2.  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 3.  No perfusion abnormality. CT CODE NEURO HEAD WO CONTRAST    Result Date: 7/23/2022  1. No intracranial hemorrhage. 2. Subtle CT evidence of an evolving acute left corona radiata infarct, in correlation with recent prior MRI. 3. No other significant change. CT CODE NEURO HEAD WO CONTRAST    Result Date: 7/22/2022  No significant interval change. No evidence of acute process. CT CODE NEURO PERF W CBF    Result Date: 7/23/2022  Stable exam without evidence for acute large vessel arterial occlusion. No significant cerebral perfusion abnormality or mismatch. CT CODE NEURO PERF W CBF    Result Date: 7/22/2022  1. No large vessel occlusion or hemodynamically significant carotid stenosis. 2.  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 3.  No perfusion abnormality. EKG:  No results found for this or any previous visit.           Opal Retana MD  7/27/2022 8:41 PM

## 2022-07-27 NOTE — PROGRESS NOTES
Problem: Patient Education: Go to Patient Education Activity  Goal: Patient/Family Education  Outcome: Progressing Towards Goal     Problem: Falls - Risk of  Goal: *Absence of Falls  Description: Document Shady Martino Fall Risk and appropriate interventions in the flowsheet.   Outcome: Progressing Towards Goal  Note: Fall Risk Interventions:  Mobility Interventions: Patient to call before getting OOB, OT consult for ADLs, PT Consult for mobility concerns, Bed/chair exit alarm, Communicate number of staff needed for ambulation/transfer    Mentation Interventions: Adequate sleep, hydration, pain control, Door open when patient unattended    Medication Interventions: Bed/chair exit alarm, Patient to call before getting OOB    Elimination Interventions: Call light in reach, Bed/chair exit alarm, Patient to call for help with toileting needs, Toileting schedule/hourly rounds    History of Falls Interventions: Bed/chair exit alarm, Consult care management for discharge planning, Investigate reason for fall         Problem: Patient Education: Go to Patient Education Activity  Goal: Patient/Family Education  Outcome: Progressing Towards Goal     Problem: Patient Education: Go to Patient Education Activity  Goal: Patient/Family Education  Outcome: Progressing Towards Goal     Problem: TIA/CVA Stroke: 0-24 hours  Goal: Off Pathway (Use only if patient is Off Pathway)  Outcome: Progressing Towards Goal  Goal: Activity/Safety  Outcome: Progressing Towards Goal  Goal: Consults, if ordered  Outcome: Progressing Towards Goal  Goal: Diagnostic Test/Procedures  Outcome: Progressing Towards Goal  Goal: Nutrition/Diet  Outcome: Progressing Towards Goal  Goal: Discharge Planning  Outcome: Progressing Towards Goal  Goal: Medications  Outcome: Progressing Towards Goal  Goal: Respiratory  Outcome: Progressing Towards Goal  Goal: Treatments/Interventions/Procedures  Outcome: Progressing Towards Goal  Goal: Minimize risk of bleeding post-thrombolytic infusion  Outcome: Progressing Towards Goal  Goal: Monitor for complications post-thrombolytic infusion  Outcome: Progressing Towards Goal  Goal: Psychosocial  Outcome: Progressing Towards Goal  Goal: *Hemodynamically stable  Outcome: Progressing Towards Goal  Goal: *Neurologically stable  Description: Absence of additional neurological deficits    Outcome: Progressing Towards Goal  Goal: *Verbalizes anxiety and depression are reduced or absent  Outcome: Progressing Towards Goal  Goal: *Absence of Signs of Aspiration on Current Diet  Outcome: Progressing Towards Goal  Goal: *Absence of deep venous thrombosis signs and symptoms(Stroke Metric)  Outcome: Progressing Towards Goal  Goal: *Ability to perform ADLs and demonstrates progressive mobility and function  Outcome: Progressing Towards Goal  Goal: *Stroke education started(Stroke Metric)  Outcome: Progressing Towards Goal  Goal: *Dysphagia screen performed(Stroke Metric)  Outcome: Progressing Towards Goal  Goal: *Rehab consulted(Stroke Metric)  Outcome: Progressing Towards Goal     Problem: TIA/CVA Stroke: Day 2 Until Discharge  Goal: Off Pathway (Use only if patient is Off Pathway)  Outcome: Progressing Towards Goal  Goal: Activity/Safety  Outcome: Progressing Towards Goal  Goal: Diagnostic Test/Procedures  Outcome: Progressing Towards Goal  Goal: Nutrition/Diet  Outcome: Progressing Towards Goal  Goal: Discharge Planning  Outcome: Progressing Towards Goal  Goal: Medications  Outcome: Progressing Towards Goal  Goal: Respiratory  Outcome: Progressing Towards Goal  Goal: Treatments/Interventions/Procedures  Outcome: Progressing Towards Goal  Goal: Psychosocial  Outcome: Progressing Towards Goal  Goal: *Verbalizes anxiety and depression are reduced or absent  Outcome: Progressing Towards Goal  Goal: *Absence of aspiration  Outcome: Progressing Towards Goal  Goal: *Absence of deep venous thrombosis signs and symptoms(Stroke Metric)  Outcome: Progressing Towards Goal  Goal: *Optimal pain control at patient's stated goal  Outcome: Progressing Towards Goal  Goal: *Tolerating diet  Outcome: Progressing Towards Goal  Goal: *Ability to perform ADLs and demonstrates progressive mobility and function  Outcome: Progressing Towards Goal  Goal: *Stroke education continued(Stroke Metric)  Outcome: Progressing Towards Goal     Problem: Ischemic Stroke: Discharge Outcomes  Goal: *Verbalizes anxiety and depression are reduced or absent  Outcome: Progressing Towards Goal  Goal: *Verbalize understanding of risk factor modification(Stroke Metric)  Outcome: Progressing Towards Goal  Goal: *Hemodynamically stable  Outcome: Progressing Towards Goal  Goal: *Absence of aspiration pneumonia  Outcome: Progressing Towards Goal  Goal: *Aware of needed dietary changes  Outcome: Progressing Towards Goal  Goal: *Verbalize understanding of prescribed medications including anti-coagulants, anti-lipid, and/or anti-platelets(Stroke Metric)  Outcome: Progressing Towards Goal  Goal: *Tolerating diet  Outcome: Progressing Towards Goal  Goal: *Aware of follow-up diagnostics related to anticoagulants  Outcome: Progressing Towards Goal  Goal: *Ability to perform ADLs and demonstrates progressive mobility and function  Outcome: Progressing Towards Goal  Goal: *Absence of DVT(Stroke Metric)  Outcome: Progressing Towards Goal  Goal: *Absence of aspiration  Outcome: Progressing Towards Goal  Goal: *Optimal pain control at patient's stated goal  Outcome: Progressing Towards Goal  Goal: *Home safety concerns addressed  Outcome: Progressing Towards Goal  Goal: *Describes available resources and support systems  Outcome: Progressing Towards Goal  Goal: *Verbalizes understanding of activation of EMS(911) for stroke symptoms(Stroke Metric)  Outcome: Progressing Towards Goal  Goal: *Understands and describes signs and symptoms to report to providers(Stroke Metric)  Outcome: Progressing Towards Goal  Goal: *Neurolgocially stable (absence of additional neurological deficits)  Outcome: Progressing Towards Goal  Goal: *Verbalizes importance of follow-up with primary care physician(Stroke Metric)  Outcome: Progressing Towards Goal  Goal: *Smoking cessation discussed,if applicable(Stroke Metric)  Outcome: Progressing Towards Goal  Goal: *Depression screening completed(Stroke Metric)  Outcome: Progressing Towards Goal     Problem: Patient Education: Go to Patient Education Activity  Goal: Patient/Family Education  Outcome: Progressing Towards Goal     Problem: Patient Education: Go to Patient Education Activity  Goal: Patient/Family Education  Outcome: Progressing Towards Goal

## 2022-07-27 NOTE — PROGRESS NOTES
Problem: Mobility Impaired (Adult and Pediatric)  Goal: *Acute Goals and Plan of Care (Insert Text)  Description:   FUNCTIONAL STATUS PRIOR TO ADMISSION: Patient was independent and active without use of DME, worked as a  delivering drinks. HOME SUPPORT PRIOR TO ADMISSION: The patient lived with his wife but did not require assist.    Physical Therapy Goals  Revised 7/25/2022  1. Patient will move from supine to sit and sit to supine  in bed with supervision/set-up within 7 day(s). 2.  Patient will transfer from bed to chair and chair to bed with supervision/set-up using the least restrictive device within 7 day(s). 3.  Patient will perform sit to stand with supervision/set-up within 7 day(s). 4.  Patient will ambulate with minimal assistance/contact guard assist for 50 feet with the least restrictive device within 7 day(s). 5.  Patient will ascend/descend 12 stairs with one handrail(s) with minimal assistance/contact guard assist within 7 day(s). 6.  Patient will improve Hill Balance score by 7 points within 7 days. Initiated 7/23/2022  1. Patient will move from supine to sit and sit to supine  in bed with independence within 7 day(s). 2.  Patient will transfer from bed to chair and chair to bed with independence using the least restrictive device within 7 day(s). 3.  Patient will perform sit to stand with independence within 7 day(s). 4.  Patient will ambulate with independence for 500 feet with the least restrictive device within 7 day(s). 5.  Patient will ascend/descend 12 stairs with handrail(s) with modified independence within 7 day(s). 6.  Patient will improve Hill Balance score by 7 points within 7 days.   Outcome: Progressing Towards Goal  PHYSICAL THERAPY TREATMENT  Patient: Cami Zapien (57 y.o. male)  Date: 7/27/2022  Diagnosis: CVA (cerebral vascular accident) [facility]) [I63.9] <principal problem not specified>      Precautions: Fall  Chart, physical therapy assessment, plan of care and goals were reviewed. ASSESSMENT  Patient continues with skilled PT services and is progressing towards goals. Pt presents with R hemiparesis UE>LE, mild R inattention, poor balance (R lean), impaired motor planning/sequencing, dysarthria, decreased safety awareness, decreased insight into deficits, impaired cognition, unsteady gait, decreased activity tolerance, and overall decline compared to his independent baseline function. Pt required modA for supine>sit transfer, Sharon for sit<>stand transfers, and modA x2 for ambulating short distance. During ambulation, pt demonstrated strong R lean and required assistance for weight shifting toward L and advancing RLE. Pt ended session returned to bed per request and was left with all needs met and nursing updated. At baseline prior to acute CVA patient was independent, RUE dominant, and working as a . He is significantly below independent functional baseline and would benefit from inpatient rehab at d/c. Current Level of Function Impacting Discharge (mobility/balance): modA x2 for ambulating short distance     Other factors to consider for discharge: independent baseline, acute CVA, fall risk, fall during this admission         PLAN :  Patient continues to benefit from skilled intervention to address the above impairments. Continue treatment per established plan of care. to address goals. Recommendation for discharge: (in order for the patient to meet his/her long term goals)  Therapy 3 hours per day 5-7 days per week    This discharge recommendation:  Has not yet been discussed the attending provider and/or case management    IF patient discharges home will need the following DME: to be determined (TBD)       SUBJECTIVE:   Patient stated I just got back in bed.  pt willing to work with therapy regardless     OBJECTIVE DATA SUMMARY:   Critical Behavior:  Neurologic State: Alert, Eyes open spontaneously  Orientation Level: Oriented X4  Cognition: Follows commands, Appropriate for age attention/concentration  Safety/Judgement: Awareness of environment, Insight into deficits  Functional Mobility Training:  Bed Mobility:  Supine to Sit: Moderate assistance  Sit to Supine: Moderate assistance    Transfers:  Sit to Stand: Minimum assistance  Stand to Sit: Minimum assistance    Balance:  Sitting: Impaired  Sitting - Static: Good (unsupported)  Sitting - Dynamic: Fair (occasional)  Standing: Impaired; With support  Standing - Static: Poor;Constant support  Standing - Dynamic : Poor;Constant support    Ambulation/Gait Training:  Distance (ft): 30 Feet (ft)  Assistive Device: Gait belt (HHA)  Ambulation - Level of Assistance: Moderate assistance;Assist x2  Gait Abnormalities: Decreased step clearance;Shuffling gait;Trunk sway increased; Path deviations; Step to gait  Base of Support: Shift to right;Center of gravity altered  Stance: Right decreased  Speed/Jennifer: Slow;Shuffled  Step Length: Right shortened;Left shortened  Swing Pattern: Right asymmetrical    Activity Tolerance:   Fair    After treatment patient left in no apparent distress:   Supine in bed, Call bell within reach, Bed / chair alarm activated, and Side rails x 3    COMMUNICATION/COLLABORATION:   The patients plan of care was discussed with: Occupational therapist and Registered nurse.      Juan Ibrahim, PT, DPT   Time Calculation: 23 mins

## 2022-07-27 NOTE — PROGRESS NOTES
Problem: Self Care Deficits Care Plan (Adult)  Goal: *Acute Goals and Plan of Care (Insert Text)  Description: FUNCTIONAL STATUS PRIOR TO ADMISSION: Patient was independent with ADLs, IADLs, and functional mobility/ ambulatory without AD. Note history of left-sided craniotomy with underlying encephalomalacia per imaging, but patient and wife confirmed he was still independent PTA with no R hemiparesis. Working as a , delivering soda. HOME SUPPORT: Patient resided with his wife and did not require assistance    Occupational Therapy Goals  Initiated 7/25/2022  1. Patient will perform grooming standing at sink with contact guard assist within 7 day(s). 2.  Patient will perform bathing with minimal assistance within 7 day(s). 3.  Patient will perform upper body dressing with minimal assistance within 7 day(s). 4.  Patient will perform lower body dressing with moderate assistance within 7 days. 5.  Patient will perform toilet transfers with minimal assistance within 7 day(s). 6.  Patient will perform all aspects of toileting with moderate assistance  within 7 day(s). 7.  Patient will participate in upper extremity therapeutic exercise/activities with minimal assistance for 10 minutes within 7 day(s). 8.  Patient will utilize fall prevention activities with verbal cues within 7 day(s). 9.  Patient will improve their Fugl Mathews score by 5 points in prep for ADLs within 7 days. Outcome: Progressing Towards Goal     OCCUPATIONAL THERAPY TREATMENT  Patient: Braxton Reno (57 y.o. male)  Date: 7/27/2022  Diagnosis: CVA (cerebral vascular accident) [facility]) [I63.9] <principal problem not specified>      Precautions: Fall  Chart, occupational therapy assessment, plan of care, and goals were reviewed.     ASSESSMENT  Patient continues to present with moderate-severe R hemiparesis affecting UE>LE (RUE overall 3- to 3+/5, very limited function), impaired balance, dysarthria, R lean, impaired cognition, impaired task sequencing and termination, decreased insight into deficits, impaired safety awareness, and mild-moderate neglect to R body and environment s/p admission for CVA with MRI positive for L corona radiata infarct. Note patient fell off toilet yesterday evening. In OT today he required constant steadying + help to advance RLE walking to/ from bathroom. Required frequent correction of R lean standing at sink but could initiate correction with verbal cuing + mirror feedback. Was able to integrate RUE in handwashing with less proximal support but required moderate verbal cues for sequencing. At baseline prior to acute CVA patient was independent, RUE dominant, and working as a . He is significantly below independent functional baseline and would benefit from inpatient rehab at d/c. Current Level of Function Impacting Discharge (ADLs/self-care): minimum to maximum assistance UB ADLs, total assistance LB ADLs         PLAN :  Patient continues to benefit from skilled intervention to address the above impairments. Continue treatment per established plan of care to address goals. Recommendation for discharge: (in order for the patient to meet his/her long term goals)  Therapy 3 hours per day 5-7 days per week    This discharge recommendation:  Has been made in collaboration with the attending provider and/or case management         SUBJECTIVE:   Patient stated I could use the bathroom.     OBJECTIVE DATA SUMMARY:   Cognitive/Behavioral Status:  Neurologic State: Alert;Eyes open spontaneously  Orientation Level: Oriented X4  Cognition: Follows commands; Appropriate for age attention/concentration             Functional Mobility and Transfers for ADLs:  Bed Mobility:  Supine to Sit: Minimum assistance  Sit to Supine:  Moderate assistance    Transfers:  Sit to Stand: Minimum assistance  Functional Transfers  Toilet Transfer : Minimum assistance (using grab bar on L side, with cuing) Balance:  Sitting: Impaired  Sitting - Static: Good (unsupported)  Sitting - Dynamic: Fair (occasional)  Standing: Impaired; With support  Standing - Static: Poor  Standing - Dynamic : Poor    ADL Intervention:       Grooming  Washing Hands: Minimum assistance (constant steadying + verbal cues + intermittent RUE proximal support)         Type of Bath: Chlorhexidine (CHG)         Toileting  Bowel Hygiene: Contact guard assistance (seated, using LUE)         Pain:  Patient did not require pain    Activity Tolerance:   Good    After treatment patient left in no apparent distress:   Supine in bed, Call bell within reach, and Bed / chair alarm activated    COMMUNICATION/COLLABORATION:   The patients plan of care was discussed with: Physical therapist and Registered nurse. Patient was educated regarding His deficit(s) of R hemiparesis, impaired balance as this relates to His diagnosis of CVA. He demonstrated fair understanding as evidenced by verbal response. Patient and/or family was verbally educated on the BE FAST acronym for signs/symptoms of CVA and TIA. BE FAST was written on patient's communication board  for visual education and reinforcement. All questions answered with patient indicating fair understanding.     Evelene Schaumann, OT  Time Calculation: 23 mins

## 2022-07-27 NOTE — PROGRESS NOTES
Transition of Care Plan: [id number] auth-started 07/27/2022     RUR: 11% low     PCP F/U: Any Bronson     Disposition: IPR     Transportation: family     Main Contact: Astrid Daly - Spouse - [phone]: Provided insurance card to [facility] to start [place]. COVID PCR ordered.  Will continue to follow    Srinivasan Smith RN, CRM

## 2022-07-28 VITALS
RESPIRATION RATE: 17 BRPM | TEMPERATURE: 98 F | SYSTOLIC BLOOD PRESSURE: 141 MMHG | OXYGEN SATURATION: 97 % | HEIGHT: 68 IN | BODY MASS INDEX: 28.8 KG/M2 | DIASTOLIC BLOOD PRESSURE: 92 MMHG | WEIGHT: 190.04 LBS | HEART RATE: 85 BPM

## 2022-07-28 PROCEDURE — 74011250637 HC RX REV CODE- 250/637: Performed by: STUDENT IN AN ORGANIZED HEALTH CARE EDUCATION/TRAINING PROGRAM

## 2022-07-28 PROCEDURE — 74011000250 HC RX REV CODE- 250: Performed by: NURSE PRACTITIONER

## 2022-07-28 PROCEDURE — 74011250637 HC RX REV CODE- 250/637: Performed by: PSYCHIATRY & NEUROLOGY

## 2022-07-28 PROCEDURE — 74011250637 HC RX REV CODE- 250/637: Performed by: NURSE PRACTITIONER

## 2022-07-28 RX ORDER — LIDOCAINE 4 G/100G
1 PATCH TOPICAL EVERY 24 HOURS
Qty: 30 PATCH | Refills: 0 | Status: SHIPPED
Start: 2022-07-28 | End: 2022-08-27

## 2022-07-28 RX ORDER — GUAIFENESIN 100 MG/5ML
81 LIQUID (ML) ORAL DAILY
Qty: 30 TABLET | Refills: 0 | Status: SHIPPED
Start: 2022-07-29 | End: 2022-08-28

## 2022-07-28 RX ORDER — CLOPIDOGREL BISULFATE 75 MG/1
75 TABLET ORAL DAILY
Qty: 30 TABLET | Refills: 0 | Status: SHIPPED
Start: 2022-07-29 | End: 2022-08-28

## 2022-07-28 RX ORDER — LANOLIN ALCOHOL/MO/W.PET/CERES
500 CREAM (GRAM) TOPICAL DAILY
Qty: 30 TABLET | Refills: 0 | Status: SHIPPED
Start: 2022-07-29 | End: 2022-08-28

## 2022-07-28 RX ORDER — ATORVASTATIN CALCIUM 40 MG/1
40 TABLET, FILM COATED ORAL DAILY
Qty: 30 TABLET | Refills: 0 | Status: SHIPPED
Start: 2022-07-29 | End: 2022-08-28

## 2022-07-28 RX ADMIN — ATORVASTATIN CALCIUM 40 MG: 40 TABLET, FILM COATED ORAL at 09:04

## 2022-07-28 RX ADMIN — CYANOCOBALAMIN TAB 500 MCG 500 MCG: 500 TAB at 09:04

## 2022-07-28 RX ADMIN — ASPIRIN 81 MG CHEWABLE TABLET 81 MG: 81 TABLET CHEWABLE at 09:04

## 2022-07-28 RX ADMIN — CLOPIDOGREL BISULFATE 75 MG: 75 TABLET ORAL at 09:04

## 2022-07-28 NOTE — PROGRESS NOTES
Transition of Care Plan: [id number] auth-received  RN to call report to: [phone], [employer] transport     RUR: 11% low     PCP F/U: Oscar Bronson     Disposition: [id number]     Transportation: spouse after 2pm     Main Contact: Astrid Daly - Spouse - [phone]     0930: [facility] received auth and can accept today after 1pm. Spouse plans on taking patient to the facility. [employer] information pending    91 [phone]: Spoke with liaison at [facility]. States she does not have a bed assignment yet. Has requested that the family hold off on bringing him until 2pm. RN notified    [phone]: [employer] information received.      Hill Christianson RN, CRM

## 2022-07-28 NOTE — ROUTINE PROCESS
TRANSFER - OUT REPORT:    Verbal report given to Chacho Ojeda RN(name) on Bill Daly  being transferred to [address] [place] [address] (unit) for routine progression of care       Report consisted of patients Situation, Background, Assessment and   Recommendations(SBAR). Information from the following report(s) SBAR, Kardex, MAR, Cardiac Rhythm NSR-SB, and Dual Neuro Assessment was reviewed with the receiving nurse. Lines:   n/a    Opportunity for questions and clarification was provided.       Patient transported with:  family

## 2022-07-28 NOTE — PROGRESS NOTES
Problem: Patient Education: Go to Patient Education Activity  Goal: Patient/Family Education  Outcome: Progressing Towards Goal     Problem: Falls - Risk of  Goal: *Absence of Falls  Description: Document Pema Owen Fall Risk and appropriate interventions in the flowsheet.   Outcome: Progressing Towards Goal  Note: Fall Risk Interventions:  Mobility Interventions: Bed/chair exit alarm, Patient to call before getting OOB, PT Consult for mobility concerns, OT consult for ADLs, Communicate number of staff needed for ambulation/transfer, Utilize gait belt for transfers/ambulation    Mentation Interventions: Adequate sleep, hydration, pain control, Bed/chair exit alarm    Medication Interventions: Bed/chair exit alarm, Patient to call before getting OOB, Utilize gait belt for transfers/ambulation    Elimination Interventions: Call light in reach, Patient to call for help with toileting needs, Toileting schedule/hourly rounds, Bed/chair exit alarm    History of Falls Interventions: Bed/chair exit alarm, Consult care management for discharge planning, Investigate reason for fall         Problem: Patient Education: Go to Patient Education Activity  Goal: Patient/Family Education  Outcome: Progressing Towards Goal     Problem: Patient Education: Go to Patient Education Activity  Goal: Patient/Family Education  Outcome: Progressing Towards Goal     Problem: TIA/CVA Stroke: 0-24 hours  Goal: Off Pathway (Use only if patient is Off Pathway)  Outcome: Progressing Towards Goal  Goal: Activity/Safety  Outcome: Progressing Towards Goal  Goal: Consults, if ordered  Outcome: Progressing Towards Goal  Goal: Diagnostic Test/Procedures  Outcome: Progressing Towards Goal  Goal: Nutrition/Diet  Outcome: Progressing Towards Goal  Goal: Discharge Planning  Outcome: Progressing Towards Goal  Goal: Medications  Outcome: Progressing Towards Goal  Goal: Respiratory  Outcome: Progressing Towards Goal  Goal: Treatments/Interventions/Procedures  Outcome: Progressing Towards Goal  Goal: Minimize risk of bleeding post-thrombolytic infusion  Outcome: Progressing Towards Goal  Goal: Monitor for complications post-thrombolytic infusion  Outcome: Progressing Towards Goal  Goal: Psychosocial  Outcome: Progressing Towards Goal  Goal: *Hemodynamically stable  Outcome: Progressing Towards Goal  Goal: *Neurologically stable  Description: Absence of additional neurological deficits    Outcome: Progressing Towards Goal  Goal: *Verbalizes anxiety and depression are reduced or absent  Outcome: Progressing Towards Goal  Goal: *Absence of Signs of Aspiration on Current Diet  Outcome: Progressing Towards Goal  Goal: *Absence of deep venous thrombosis signs and symptoms(Stroke Metric)  Outcome: Progressing Towards Goal  Goal: *Ability to perform ADLs and demonstrates progressive mobility and function  Outcome: Progressing Towards Goal  Goal: *Stroke education started(Stroke Metric)  Outcome: Progressing Towards Goal  Goal: *Dysphagia screen performed(Stroke Metric)  Outcome: Progressing Towards Goal  Goal: *Rehab consulted(Stroke Metric)  Outcome: Progressing Towards Goal     Problem: TIA/CVA Stroke: Day 2 Until Discharge  Goal: Off Pathway (Use only if patient is Off Pathway)  Outcome: Progressing Towards Goal  Goal: Activity/Safety  Outcome: Progressing Towards Goal  Goal: Diagnostic Test/Procedures  Outcome: Progressing Towards Goal  Goal: Nutrition/Diet  Outcome: Progressing Towards Goal  Goal: Discharge Planning  Outcome: Progressing Towards Goal  Goal: Medications  Outcome: Progressing Towards Goal  Goal: Respiratory  Outcome: Progressing Towards Goal  Goal: Treatments/Interventions/Procedures  Outcome: Progressing Towards Goal  Goal: Psychosocial  Outcome: Progressing Towards Goal  Goal: *Verbalizes anxiety and depression are reduced or absent  Outcome: Progressing Towards Goal  Goal: *Absence of aspiration  Outcome: Progressing Towards Goal  Goal: *Absence of deep venous thrombosis signs and symptoms(Stroke Metric)  Outcome: Progressing Towards Goal  Goal: *Optimal pain control at patient's stated goal  Outcome: Progressing Towards Goal  Goal: *Tolerating diet  Outcome: Progressing Towards Goal  Goal: *Ability to perform ADLs and demonstrates progressive mobility and function  Outcome: Progressing Towards Goal  Goal: *Stroke education continued(Stroke Metric)  Outcome: Progressing Towards Goal     Problem: Ischemic Stroke: Discharge Outcomes  Goal: *Verbalizes anxiety and depression are reduced or absent  Outcome: Progressing Towards Goal  Goal: *Verbalize understanding of risk factor modification(Stroke Metric)  Outcome: Progressing Towards Goal  Goal: *Hemodynamically stable  Outcome: Progressing Towards Goal  Goal: *Absence of aspiration pneumonia  Outcome: Progressing Towards Goal  Goal: *Aware of needed dietary changes  Outcome: Progressing Towards Goal  Goal: *Verbalize understanding of prescribed medications including anti-coagulants, anti-lipid, and/or anti-platelets(Stroke Metric)  Outcome: Progressing Towards Goal  Goal: *Tolerating diet  Outcome: Progressing Towards Goal  Goal: *Aware of follow-up diagnostics related to anticoagulants  Outcome: Progressing Towards Goal  Goal: *Ability to perform ADLs and demonstrates progressive mobility and function  Outcome: Progressing Towards Goal  Goal: *Absence of DVT(Stroke Metric)  Outcome: Progressing Towards Goal  Goal: *Absence of aspiration  Outcome: Progressing Towards Goal  Goal: *Optimal pain control at patient's stated goal  Outcome: Progressing Towards Goal  Goal: *Home safety concerns addressed  Outcome: Progressing Towards Goal  Goal: *Describes available resources and support systems  Outcome: Progressing Towards Goal  Goal: *Verbalizes understanding of activation of EMS(911) for stroke symptoms(Stroke Metric)  Outcome: Progressing Towards Goal  Goal: *Understands and describes signs and symptoms to report to providers(Stroke Metric)  Outcome: Progressing Towards Goal  Goal: *Neurolgocially stable (absence of additional neurological deficits)  Outcome: Progressing Towards Goal  Goal: *Verbalizes importance of follow-up with primary care physician(Stroke Metric)  Outcome: Progressing Towards Goal  Goal: *Smoking cessation discussed,if applicable(Stroke Metric)  Outcome: Progressing Towards Goal  Goal: *Depression screening completed(Stroke Metric)  Outcome: Progressing Towards Goal     Problem: Patient Education: Go to Patient Education Activity  Goal: Patient/Family Education  Outcome: Progressing Towards Goal     Problem: Patient Education: Go to Patient Education Activity  Goal: Patient/Family Education  Outcome: Progressing Towards Goal

## 2022-07-28 NOTE — ROUTINE PROCESS
Bedside and Verbal shift change report given to Jess Choi RN (oncoming nurse) by Peter Calderon RN (offgoing nurse). Report included the following information SBAR, Kardex, MAR, Cardiac Rhythm NSR-SB, and Dual Neuro Assessment.

## 2022-07-28 NOTE — ROUTINE PROCESS
I have reviewed discharge instructions with the patient and spouse. The patient and spouse verbalized understanding. Discharge medications reviewed with patient and spouse and appropriate educational materials and side effects teaching were provided. Patient discharged to facility via spouse with belongings. Bedside RN performed patient education and medication education. Discharge concerns initiated and discussed with patient, including clarification on \"who\" assists the patient at their home and instructions for when the home going patient should call their provider after discharge. Opportunity for questions and clarification was provided. Patient receptive to education: YES  Patient stated:   Barriers to Education:  Diagnosis Education given:  YES    Length of stay: 6  Expected Day of Discharge: today  Ask if they have \"Help at Home\" & add to white board?   NO    Education Day #: 6    Medication Education Given:  YES  M in the box Medication name: aspirin    Pt aware of HCAHPS survey: NO    Stroke Education documented in Patient Education: YES  Core Measures Documented in [facility]:  Risk Factors: YES  Warning signs of stroke: YES  When to Activate 911: YES  Medication Education for Risk Factors: YES  Smoking cessation if applicable: YES  Written Education Given:  YES    Discharge NIH Completed: YES  Score: 5    BRAINS: YES    Follow Up Appointment Made: NO  Date/Time if applicable:

## 2022-07-28 NOTE — PROGRESS NOTES
Problem: Falls - Risk of  Goal: *Absence of Falls  Description: Document Shady Martino Fall Risk and appropriate interventions in the flowsheet.   Outcome: Progressing Towards Goal  Note: Fall Risk Interventions:  Mobility Interventions: Patient to call before getting OOB, OT consult for ADLs, PT Consult for mobility concerns, Bed/chair exit alarm, Communicate number of staff needed for ambulation/transfer    Mentation Interventions: Adequate sleep, hydration, pain control, Door open when patient unattended    Medication Interventions: Bed/chair exit alarm, Patient to call before getting OOB    Elimination Interventions: Call light in reach, Bed/chair exit alarm, Patient to call for help with toileting needs, Toileting schedule/hourly rounds    History of Falls Interventions: Bed/chair exit alarm, Consult care management for discharge planning, Investigate reason for fall         Problem: Patient Education: Go to Patient Education Activity  Goal: Patient/Family Education  Outcome: Progressing Towards Goal     Problem: TIA/CVA Stroke: 0-24 hours  Goal: Off Pathway (Use only if patient is Off Pathway)  Outcome: Progressing Towards Goal  Goal: Activity/Safety  Outcome: Progressing Towards Goal  Goal: Diagnostic Test/Procedures  Outcome: Progressing Towards Goal

## 2022-07-28 NOTE — DISCHARGE SUMMARY
Discharge Summary       PATIENT ID: Litzy Ventura  MRN: [id number]   YOB: 1965    DATE OF ADMISSION: 7/22/2022  7:10 PM    DATE OF DISCHARGE: 07/28/22    PRIMARY CARE PROVIDER: None     ATTENDING PHYSICIAN: Griffin Linares MD   DISCHARGING PROVIDER: Griffin Linares MD    To contact this individual call [phone] and ask the  to page. If unavailable ask to be transferred the Adult Hospitalist Department. CONSULTATIONS: IP CONSULT TO HOSPITALIST  IP CONSULT TO NEUROLOGY    PROCEDURES/SURGERIES: * No surgery found *    ADMITTING [address] [place] [address] COURSE:   Managed for acute CVA w/ R sided hemiparesis. MRI brain confirmed small acute infarct at the left corona at left corona radiata. Neuro evaluated patient, no keppra needed not evidence of seizure. TTE no PFO. Cont asa + plavix and statin. D/c to IPR, ffu op with neuro op. DISCHARGE DIAGNOSES / PLAN:      ACute CVA with right-sided hemiparesis  --07/23 Small focal acute infarct at the left corona radiata. Left-sided craniotomy changes and underlying encephalomalacia/postsurgical  change. -- Consulted, he has been started on aspirin, Plavix, high intensity statin, and per neuro d/c deppra no seizures on presentation   --07/24 ARU, PRU with expected antiplatelet effect, resumed on ASA  Plavix, statin , and keppra, ECHO pending  --07/25 ECHO perfromed no LV Throbus or PFO, Normal EF,   --Resume on ASA + Plavix + Atorvastatin  --07/26 and 27 Pending In patient rehab placement     Traumatic brain injury  - Subdural hemorrhage requiring left-sided craniotomy, with underlying encephalomalacia         FOLLOW UP APPOINTMENTS:    Follow-up Information       Follow up With Specialties Details Why Contact Info    pcp  Follow up in 3 day(s)      Grace Jama MD Neurology Follow up in 1 week(s)  [facility] [address]  [phone]      None    None (395) Patient stated that they have no PCP ADDITIONAL CARE RECOMMENDATIONS: rpt cbc and bmp 3-5 days     DIET: Resume previous diet    ACTIVITY: Activity as tolerated      DISCHARGE MEDICATIONS:  Current Discharge Medication List        START taking these medications    Details   aspirin 81 mg chewable tablet Take 1 Tablet by mouth in the morning for 30 days. Qty: 30 Tablet, Refills: 0  Start date: 7/29/2022, End date: 8/28/2022      atorvastatin (LIPITOR) 40 mg tablet Take 1 Tablet by mouth in the morning for 30 days. Qty: 30 Tablet, Refills: 0  Start date: 7/29/2022, End date: 8/28/2022      clopidogreL (PLAVIX) 75 mg tab Take 1 Tablet by mouth in the morning for 30 days. Qty: 30 Tablet, Refills: 0  Start date: 7/29/2022, End date: 8/28/2022      cyanocobalamin (VITAMIN B12) 500 mcg tablet Take 1 Tablet by mouth in the morning for 30 days. Qty: 30 Tablet, Refills: 0  Start date: 7/29/2022, End date: 8/28/2022      lidocaine 4 % patch 1 Patch by TransDERmal route every twenty-four (24) hours for 30 days. Qty: 30 Patch, Refills: 0  Start date: 7/28/2022, End date: 8/27/2022               NOTIFY YOUR PHYSICIAN FOR ANY OF THE FOLLOWING:   Fever over 101 degrees for 24 hours. Chest pain, shortness of breath, fever, chills, nausea, vomiting, diarrhea, change in mentation, falling, weakness, bleeding. Severe pain or pain not relieved by medications. Or, any other signs or symptoms that you may have questions about.     DISPOSITION:    Home With:   OT  PT  HH  RN      x Long term SNF/Inpatient Rehab    Independent/assisted living    Hospice    Other:       PATIENT CONDITION AT DISCHARGE:     Functional status    Poor    x Deconditioned     Independent      Cognition    x Lucid     Forgetful     Dementia      Catheters/lines (plus indication)    [place]     PICC     PEG    x None      Code status   x Full code     DNR      PHYSICAL EXAMINATION AT DISCHARGE:  General: No acute distress, speaking in full sentences, no use of accessory muscles  HEENT: Pupils equal and reactive to light and accommodation, oropharynx is clear  Neck: Supple, no lymphadenopathy, no JVD  Lungs: Clear to auscultation bilaterally  Cardiovascular: Regular rate and rhythm with normal S1 and S2  Abdomen: Soft, nontender, nondistended, normoactive bowel sounds  Extremities: No cyanosis clubbing or edema  Neuro: Facial droop, right upper and lower extremity weakness , A&O x3  Psych: Normal affect      CHRONIC MEDICAL DIAGNOSES:  Problem List as of 7/28/2022 Date Reviewed: 7/23/2022            Codes Class Noted - Resolved    CVA (cerebral vascular accident) [facility]) ICD-10-CM: I63.9  ICD-9-CM: 434.91  7/22/2022 - Present        Dysarthria ICD-10-CM: R47.1  ICD-9-CM: 784.51  10/31/2019 - Present        CVA (cerebral infarction) ICD-10-CM: I63.9  ICD-9-CM: 434.91  8/4/2012 - Present        Tobacco abuse ICD-10-CM: Z72.0  ICD-9-CM: 305.1  8/4/2012 - Present        Alcohol abuse ICD-10-CM: F10.10  ICD-9-CM: 305.00  8/4/2012 - Present        RESOLVED: Subdural hemorrhage ([facility] [address] ICD-10-CM: I62.00  ICD-9-CM: 432.1  8/4/2012 - 7/23/2022           Greater than 30 minutes were spent with the patient on counseling and coordination of care    Signed:   Mikayla Dumas MD  7/28/2022  11:03 AM

## 2022-09-12 ENCOUNTER — OFFICE VISIT (OUTPATIENT)
Dept: NEUROLOGY | Age: 57
End: 2022-09-12
Payer: COMMERCIAL

## 2022-09-12 VITALS
SYSTOLIC BLOOD PRESSURE: 136 MMHG | DIASTOLIC BLOOD PRESSURE: 88 MMHG | HEIGHT: 68 IN | OXYGEN SATURATION: 96 % | HEART RATE: 86 BPM | BODY MASS INDEX: 28.79 KG/M2 | WEIGHT: 190 LBS

## 2022-09-12 DIAGNOSIS — I69.951 SPASTIC HEMIPARESIS OF RIGHT DOMINANT SIDE AS LATE EFFECT OF CEREBROVASCULAR DISEASE (HCC): ICD-10-CM

## 2022-09-12 DIAGNOSIS — I69.30 CHRONIC ISCHEMIC LEFT MCA STROKE: Primary | ICD-10-CM

## 2022-09-12 PROCEDURE — 99214 OFFICE O/P EST MOD 30 MIN: CPT | Performed by: PSYCHIATRY & NEUROLOGY

## 2022-09-12 RX ORDER — BACLOFEN 20 MG/1
20 TABLET ORAL 3 TIMES DAILY
COMMUNITY
Start: 2022-08-17 | End: 2022-09-12 | Stop reason: SDUPTHER

## 2022-09-12 RX ORDER — LOSARTAN POTASSIUM 50 MG/1
50 TABLET ORAL EVERY 12 HOURS
COMMUNITY
Start: 2022-08-17 | End: 2022-09-12 | Stop reason: SDUPTHER

## 2022-09-12 RX ORDER — LOSARTAN POTASSIUM 50 MG/1
50 TABLET ORAL EVERY 12 HOURS
Qty: 60 TABLET | Refills: 2 | Status: SHIPPED | OUTPATIENT
Start: 2022-09-12

## 2022-09-12 RX ORDER — AMLODIPINE BESYLATE 10 MG/1
10 TABLET ORAL DAILY
Qty: 90 TABLET | Refills: 0 | Status: SHIPPED | OUTPATIENT
Start: 2022-09-12

## 2022-09-12 RX ORDER — BACLOFEN 20 MG/1
20 TABLET ORAL 3 TIMES DAILY
Qty: 270 TABLET | Refills: 2 | Status: SHIPPED | OUTPATIENT
Start: 2022-09-12

## 2022-09-12 RX ORDER — CLOPIDOGREL BISULFATE 75 MG/1
75 TABLET ORAL DAILY
Qty: 90 TABLET | Refills: 2 | Status: SHIPPED | OUTPATIENT
Start: 2022-09-12

## 2022-09-12 RX ORDER — DICLOFENAC SODIUM 10 MG/G
GEL TOPICAL
COMMUNITY
Start: 2022-08-17

## 2022-09-12 RX ORDER — TRAMADOL HYDROCHLORIDE 50 MG/1
TABLET ORAL
COMMUNITY
Start: 2022-08-17

## 2022-09-12 RX ORDER — AMLODIPINE BESYLATE 10 MG/1
10 TABLET ORAL DAILY
COMMUNITY
Start: 2022-08-17 | End: 2022-09-12 | Stop reason: SDUPTHER

## 2022-09-12 RX ORDER — ATORVASTATIN CALCIUM 40 MG/1
40 TABLET, FILM COATED ORAL
Qty: 90 TABLET | Refills: 2 | Status: SHIPPED | OUTPATIENT
Start: 2022-09-12

## 2022-09-12 RX ORDER — GABAPENTIN 300 MG/1
CAPSULE ORAL
COMMUNITY
Start: 2022-08-17

## 2022-09-12 NOTE — PROGRESS NOTES
Chief Complaint   Patient presents with    Follow-up     After CVA 07/22/2022       HPI    57-year-old gentleman following up, RHD. I saw him in the hospital this past July when he presented with a stroke in the left MCA territory. Also evidence of a right vertebral artery stenosis. He has a remote history of subdural hemorrhage requiring craniotomy with subsequent seizures now in remission. Since I saw him last he completed inpatient rehabilitation and now is doing outpatient PT OT and speech. He is taking aspirin and Plavix daily, both medications therapeutic on testing. He has having pain and spasm in the right arm and leg. He has an AFO on the right leg. He is on baclofen and gabapentin. It seems like he was on tramadol at 1 point but no longer is taking it. Not on seizure medicines since he is not having seizures. 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 He is asking about returning back to work as a . He has not followed up with primary care since discharge. Background:  Doc Finney is a 57-year-old gentleman who tells me he has a history of traumatic brain injury who had surgery to the left side of his head. 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 Per the records it seems like he has had surgery before that. He is here because yesterday he had some speech difficulty which was different from his baseline. He still does not feel back to his complete normal.  No headache or double vision. No weakness. 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 He was unaware of this. He does not have a history of seizures he knows of. He is never had a convulsive event. Review of Systems   Unable to perform ROS: Language     Past Medical History:   Diagnosis Date    Epilepsy ([facility] [address]      History reviewed.  No pertinent family history. Social History     Socioeconomic History    Marital status:      Spouse name: Not on file    Number of children: Not on file    Years of education: Not on file    Highest education level: Not on file   Occupational History    Not on file   Tobacco Use    Smoking status: Never    Smokeless tobacco: Never   Vaping Use    Vaping Use: Never used   Substance and Sexual Activity    Alcohol use: No    Drug use: No    Sexual activity: Yes     Partners: Female   Other Topics Concern    Not on file   Social History Narrative    Not on file     Social Determinants of Health     Financial Resource Strain: Not on file   Food Insecurity: Not on file   Transportation Needs: Not on file   Physical Activity: Not on file   Stress: Not on file   Social Connections: Not on file   Intimate Partner Violence: Not on file   Housing Stability: Not on file     No Known Allergies      Current Outpatient Medications   Medication Sig    diclofenac (VOLTAREN) 1 % gel APPLY GEL TOPICALLY THREE TIMES DAILY AS NEEDED FOR PAIN DO NOT REQUEST REFILLS FROM THE PRESCRIBER    gabapentin (NEURONTIN) 300 mg capsule TAKE 1 CAPSULE BY MOUTH AT BEDTIME    traMADoL (ULTRAM) 50 mg tablet TAKE 1/2 (ONE-HALF) TABLET BY MOUTH TWICE DAILY AS NEEDED FOR PAIN    baclofen (LIORESAL) 20 mg tablet Take 1 Tablet by mouth three (3) times daily. clopidogreL (PLAVIX) 75 mg tab Take 1 Tablet by mouth daily. atorvastatin (LIPITOR) 40 mg tablet Take 1 Tablet by mouth nightly. amLODIPine (NORVASC) 10 mg tablet Take 1 Tablet by mouth daily. losartan (COZAAR) 50 mg tablet Take 1 Tablet by mouth every twelve (12) hours. No current facility-administered medications for this visit. Neurologic Exam     Mental Status   WD/WN adult in NAD, normal grooming  VSS  A&O x 3    PERRL, nonicteric  Face is slight right asymmetry, tongue midline  Speech is with mild aphasia motor  No limb ataxia. No abnl movements.   Right upper extremity drift  Reduced arm swing on the right  Circumducting the right gait         Visit Vitals  /88 (BP 1 Location: Right upper arm, BP Patient Position: Sitting)   Pulse 86   Ht 5' 8\" (1.727 m)   Wt 190 lb (86.2 kg)   SpO2 96%   BMI 28.89 kg/m²       Assessment and Plan   Diagnoses and all orders for this visit:    1. Chronic ischemic left MCA stroke  -     REFERRAL TO OCCUPATIONAL THERAPY    2. Spastic hemiparesis of right dominant side as late effect of cerebrovascular disease (HCC)  -     REFERRAL TO OCCUPATIONAL THERAPY    Other orders  -     baclofen (LIORESAL) 20 mg tablet; Take 1 Tablet by mouth three (3) times daily. -     clopidogreL (PLAVIX) 75 mg tab; Take 1 Tablet by mouth daily. -     atorvastatin (LIPITOR) 40 mg tablet; Take 1 Tablet by mouth nightly. -     amLODIPine (NORVASC) 10 mg tablet; Take 1 Tablet by mouth daily. -     losartan (COZAAR) 50 mg tablet; Take 1 Tablet by mouth every twelve (12) hours. 53-year-old gentleman who had an acute left MCA stroke in July continues to have a mild aphasia and right spastic hemiparesis. I explained to him that he needs to be on both aspirin and Plavix for stroke prevention. Both medications are effective based on testing in the hospital.  I have refilled his Plavix. He also needs to stay on Lipitor to further reduce his risk of stroke. Do not resume tramadol. Continue baclofen for the spastic hemiparesis. I need him to establish with primary care soon to follow his blood pressure. I refilled his blood pressure medications temporary basis until he can see primary care. We talked about the consideration to return to work which is still indeterminate at this time. I think he needs to finish his outpatient therapy and then we will reassess with a driving evaluation. It is possible he could regain more function however I do anticipate some residual deficits. He understands plan. I also spoke with his wife. Stroke education done. Follow-up in February. I reviewed and decided to continue the current medications. This clinical note was dictated with an electronic dictation software that can make unintentional errors. If there are any questions, please contact me directly for clarification.       [facility], [facility] [address]  Diplomate ABPN

## 2022-09-12 NOTE — PROGRESS NOTES
Chief Complaint   Patient presents with    Follow-up     After CVA 07/22/2022     Visit Vitals  /88 (BP 1 Location: Right upper arm, BP Patient Position: Sitting)   Pulse 86   Ht 5' 8\" (1.727 m)   Wt 190 lb (86.2 kg)   SpO2 96%   BMI 28.89 kg/m²

## 2022-11-11 NOTE — PROGRESS NOTES
Bedside shift change report given to Garrett Funez (oncoming nurse) by Kaley Pendleton (offgoing nurse). Report included the following information SBAR, MAR, Cardiac Rhythm (NSR, SB), and Dual Neuro Assessment. No

## 2023-01-03 ENCOUNTER — VIRTUAL VISIT (OUTPATIENT)
Dept: INTERNAL MEDICINE CLINIC | Age: 58
End: 2023-01-03
Payer: COMMERCIAL

## 2023-01-03 DIAGNOSIS — Z86.73 HISTORY OF CVA (CEREBROVASCULAR ACCIDENT): ICD-10-CM

## 2023-01-03 DIAGNOSIS — Z12.11 COLON CANCER SCREENING: ICD-10-CM

## 2023-01-03 DIAGNOSIS — Z76.89 ESTABLISHING CARE WITH NEW DOCTOR, ENCOUNTER FOR: Primary | ICD-10-CM

## 2023-01-03 DIAGNOSIS — I10 ESSENTIAL HYPERTENSION: ICD-10-CM

## 2023-01-03 PROBLEM — S89.90XA LEG INJURY: Status: ACTIVE | Noted: 2018-12-18

## 2023-01-03 PROCEDURE — 99203 OFFICE O/P NEW LOW 30 MIN: CPT | Performed by: NURSE PRACTITIONER

## 2023-01-03 RX ORDER — LOSARTAN POTASSIUM 50 MG/1
50 TABLET ORAL EVERY 12 HOURS
Qty: 180 TABLET | Refills: 1 | Status: SHIPPED | OUTPATIENT
Start: 2023-01-03

## 2023-01-03 RX ORDER — AMLODIPINE BESYLATE 10 MG/1
10 TABLET ORAL DAILY
Qty: 90 TABLET | Refills: 1 | Status: SHIPPED | OUTPATIENT
Start: 2023-01-03

## 2023-01-03 RX ORDER — GUAIFENESIN 100 MG/5ML
81 LIQUID (ML) ORAL DAILY
COMMUNITY

## 2023-01-03 NOTE — PROGRESS NOTES
Henrik Jones is a 59 y.o. male who was seen by synchronous (real-time) audio-video technology on 1/3/2023 for [address]:   Diagnoses and all orders for this visit:    1. Establishing care with new doctor, encounter for    2. Essential hypertension  -     amLODIPine (NORVASC) 10 mg tablet; Take 1 Tablet by mouth daily. -     losartan (COZAAR) 50 mg tablet; Take 1 Tablet by mouth every twelve (12) hours. 3. History of CVA (cerebrovascular accident)  -     amLODIPine (NORVASC) 10 mg tablet; Take 1 Tablet by mouth daily. -     losartan (COZAAR) 50 mg tablet; Take 1 Tablet by mouth every twelve (12) hours. -     REFERRAL TO SPEECH THERAPY    4. Colon cancer screening  -     REFERRAL TO GASTROENTEROLOGY    The complexity of medical decision making for this visit is moderate       Subjective:       Prior to Admission medications    Medication Sig Start Date End Date Taking? Authorizing Provider   aspirin 81 mg chewable tablet Take 81 mg by mouth daily. Yes Provider, Historical   amLODIPine (NORVASC) 10 mg tablet Take 1 Tablet by mouth daily. 1/3/23  Yes Yonathan Danielson., NP   losartan (COZAAR) 50 mg tablet Take 1 Tablet by mouth every twelve (12) hours. 1/3/23  Yes Yonathan Danielson., NP   clopidogreL (PLAVIX) 75 mg tab Take 1 Tablet by mouth daily. 9/12/22  Yes Luis Michael, DO   atorvastatin (LIPITOR) 40 mg tablet Take 1 Tablet by mouth nightly.  9/12/22  Yes Luis Michael,    diclofenac (VOLTAREN) 1 % gel APPLY GEL TOPICALLY THREE TIMES DAILY AS NEEDED FOR PAIN DO NOT REQUEST REFILLS FROM THE PRESCRIBER  Patient not taking: Reported on 1/3/2023 8/17/22 1/3/23  Provider, Historical   gabapentin (NEURONTIN) 300 mg capsule TAKE 1 CAPSULE BY MOUTH AT BEDTIME  Patient not taking: Reported on 1/3/2023 8/17/22 1/3/23  Provider, Historical   traMADoL (ULTRAM) 50 mg tablet TAKE 1/2 (ONE-HALF) TABLET BY MOUTH TWICE DAILY AS NEEDED FOR PAIN 8/17/22 1/3/23  Provider, Historical   baclofen (LIORESAL) 20 mg tablet Take 1 Tablet by mouth three (3) times daily. 9/12/22 1/3/23  Luis Michael DO   amLODIPine (NORVASC) 10 mg tablet Take 1 Tablet by mouth daily. 9/12/22 1/3/23  Luis Michael DO   losartan (COZAAR) 50 mg tablet Take 1 Tablet by mouth every twelve (12) hours. 9/12/22 1/3/23  Bal MARISCAL DO     Patient Active Problem List   Diagnosis Code    CVA (cerebral infarction) I63.9    Tobacco abuse Z72.0    Alcohol abuse F10.10    Dysarthria R47.1    CVA (cerebral vascular accident) ([facility] [address] I63.9    Leg injury S89.90XA       ROS    Est care    Hypertension ROS:  the has not had medication in 2 weeks, when he had meds, home readings; 1      Hx of CVA: in July, 2023 he had a LeFT coronal infarct   No hx of seizure or PFO  On asa, plavix, statin  Follows w/ neurology Dr. Vito Seay  who is leaving, he has new neuro appt mid Feb  He is doing rehab thru [address], he was doing speech but this ended, he would like to continue if able for stutter      Colonoscopy: never done, no GI s/s  Prosatic s/s; none    Former  , on disability now, unsure about permanent disabiity    Objective:   No flowsheet data found.      [INSTRUCTIONS:  \"[x]\" Indicates a positive item  \"[]\" Indicates a negative item  -- DELETE ALL ITEMS NOT EXAMINED]    Constitutional: [x] Appears well-developed and well-nourished [x] No apparent distress      [] Abnormal -     Mental status: [x] Alert and awake  [x] Oriented to person/place/time [x] Able to follow commands    [] Abnormal -     Eyes:   EOM    [x]  Normal    [] Abnormal -   Sclera  [x]  Normal    [] Abnormal -          Discharge [x]  None visible   [] Abnormal -     HENT: [x] Normocephalic, atraumatic  [] Abnormal -   [x] Mouth/Throat: Mucous membranes are moist    External Ears [x] Normal  [] Abnormal -    Neck: [x] No visualized mass [] Abnormal -     Pulmonary/Chest: [x] Respiratory effort normal   [x] No visualized signs of difficulty breathing or respiratory distress        [] Abnormal -      Musculoskeletal:   [x] Normal gait with no signs of ataxia         [x] Normal range of motion of neck        [] Abnormal -     Neurological:        [x] No Facial Asymmetry (Cranial nerve 7 motor function) (limited exam due to video visit)          [x] No gaze palsy        [] Abnormal -          Skin:        [x] No significant exanthematous lesions or discoloration noted on facial skin         [] Abnormal -            Psychiatric:       [x] Normal Affect [] Abnormal -        [x] No Hallucinations    Other pertinent observable physical exam findings:-        We discussed the expected course, resolution and complications of the diagnosis(es) in detail. Medication risks, benefits, costs, interactions, and alternatives were discussed as indicated. I advised him to contact the office if his condition worsens, changes or fails to improve as anticipated. He expressed understanding with the diagnosis(es) and plan. Bill Daly, was evaluated through a synchronous (real-time) audio-video encounter. The patient (or guardian if applicable) is aware that this is a billable service, which includes applicable co-pays. This Virtual Visit was conducted with patient's (and/or legal guardian's) consent. The visit was conducted pursuant to the emergency declaration under the [address] [place] [address] authority and the "[employer] and [employer] General Act. Patient identification was verified, and a caregiver was present when appropriate. The patient was located at: Home: [address] [place] [phone]  The provider was located at: Home: [unfilled]        Lidya Barrientos, CHASIDY

## 2023-01-03 NOTE — PROGRESS NOTES
Chief Complaint   Patient presents with    Establish Care       1. \"Have you been to the ER, urgent care clinic since your last visit? Hospitalized since your last visit? \" Yes When: 07/22/22 Where: [facility] Reason for visit: CVA    2. \"Have you seen or consulted any other health care providers outside of the [address] [place] [address] since your last visit? \" No     3. For patients aged 39-70: Has the patient had a colonoscopy / FIT/ Cologuard? No      If the patient is female:    4. For patients aged 41-77: Has the patient had a mammogram within the past 2 years? NA - based on age or sex      11. For patients aged 21-65: Has the patient had a pap smear?  NA - based on age or sex

## 2023-02-08 ENCOUNTER — OFFICE VISIT (OUTPATIENT)
Dept: INTERNAL MEDICINE CLINIC | Age: 58
End: 2023-02-08
Payer: COMMERCIAL

## 2023-02-08 VITALS
BODY MASS INDEX: 31.52 KG/M2 | RESPIRATION RATE: 18 BRPM | HEART RATE: 84 BPM | SYSTOLIC BLOOD PRESSURE: 111 MMHG | WEIGHT: 208 LBS | HEIGHT: 68 IN | OXYGEN SATURATION: 98 % | TEMPERATURE: 98 F | DIASTOLIC BLOOD PRESSURE: 73 MMHG

## 2023-02-08 DIAGNOSIS — R35.1 NOCTURIA: ICD-10-CM

## 2023-02-08 DIAGNOSIS — I10 ESSENTIAL HYPERTENSION: Primary | ICD-10-CM

## 2023-02-08 DIAGNOSIS — E78.2 MIXED HYPERLIPIDEMIA: ICD-10-CM

## 2023-02-08 DIAGNOSIS — Z86.73 HISTORY OF CVA (CEREBROVASCULAR ACCIDENT): ICD-10-CM

## 2023-02-08 DIAGNOSIS — R29.898 RIGHT HAND WEAKNESS: ICD-10-CM

## 2023-02-08 DIAGNOSIS — I10 ESSENTIAL HYPERTENSION: ICD-10-CM

## 2023-02-08 DIAGNOSIS — F80.81 STUTTER: ICD-10-CM

## 2023-02-08 PROCEDURE — 99214 OFFICE O/P EST MOD 30 MIN: CPT | Performed by: NURSE PRACTITIONER

## 2023-02-08 NOTE — PROGRESS NOTES
Id  HIPAA verified by two patient identifiers. Health Maintenance Due   Topic    Hepatitis C Screening     COVID-19 Vaccine (1)    DTaP/Tdap/Td series (1 - Tdap)    Colorectal Cancer Screening Combo     Shingles Vaccine (1 of 2)    Flu Vaccine (1)     Chief Complaint   Patient presents with    Follow-up     Visit Vitals  /73   Pulse 84   Temp 98 °F (36.7 °C) (Temporal)   Resp 18   Ht 5' 8\" (1.727 m)   Wt 208 lb (94.3 kg)   SpO2 98%   BMI 31.63 kg/m²       Pain Scale: 0 - No pain/10  Pain Location:   1. Have you been to the ER, urgent care clinic since your last visit? Hospitalized since your last visit? No    2. Have you seen or consulted any other health care providers outside of the [address] [place] [address] since your last visit? Include any pap smears or colon screening.  No

## 2023-02-08 NOTE — PROGRESS NOTES
Subjective: (As above and below)     Chief Complaint   Patient presents with    Follow-up     Darryl Alleen Barthel is a 59y.o. year old male who presents for     Hypertension ROS:  taking medications as instructed, no medication side effects noted, no TIAs, no chest pain on exertion, no dyspnea on exertion, no swelling of ankles    Hyperlipidemia: tolerating statin    Hx of recent  CVA: plavix, statin, follows w neuro, doing OT and speech therapy for stutter, some improvement  On long term disability (was a )  He feels some slight improvement  He has R sided weakness, arm more than leg  He has R hand intermittent swelling, improves w/ elevation  No warmth, redness, pain    Colonoscopy: scheduled for March    Prostatic s/s: voids 1-2 x per night    Reviewed PmHx, RxHx, FmHx, SocHx, AllgHx and updated in chart. Family History   Problem Relation Age of Onset    Hypertension Mother     Dementia Mother     Hypertension Father        Past Medical History:   Diagnosis Date    Hypertension     Stroke [facility])       Social History     Socioeconomic History    Marital status:    Tobacco Use    Smoking status: Never    Smokeless tobacco: Never   Vaping Use    Vaping Use: Never used   Substance and Sexual Activity    Alcohol use: No    Drug use: No    Sexual activity: Yes     Partners: Female     Social Determinants of Health     Financial Resource Strain: Low Risk     Difficulty of Paying Living Expenses: Not hard at all   Food Insecurity: No Food Insecurity    Worried About Running Out of Food in the Last Year: Never true    Ran Out of Food in the Last Year: Never true          Current Outpatient Medications   Medication Sig    aspirin 81 mg chewable tablet Take 81 mg by mouth daily. amLODIPine (NORVASC) 10 mg tablet Take 1 Tablet by mouth daily. losartan (COZAAR) 50 mg tablet Take 1 Tablet by mouth every twelve (12) hours. clopidogreL (PLAVIX) 75 mg tab Take 1 Tablet by mouth daily.     atorvastatin (LIPITOR) 40 mg tablet Take 1 Tablet by mouth nightly. No current facility-administered medications for this visit. Review of Systems:   Constitutional:    Negative for fever and chills, negative diaphoresis. HEENT:              Negative for neck pain and stiffness. Eyes:                  Negative for visual disturbance, itching, redness or discharge. Respiratory:        Negative for cough and shortness of breath. Cardiovascular:  Negative for chest pain and palpitations. Gastrointestinal: Negative for nausea, vomiting, abdominal pain, diarrhea or constipation. Genitourinary:     Negative for dysuria and frequency. Musculoskeletal: Negative for falls, tenderness and swelling. Skin:                    Negative for rash, masses or lesions. Neurological:       Negative for dizzyness, seizure, loss of consciousness, weakness and numbness. Objective:     Vitals:    02/08/23 0942   BP: 111/73   Pulse: 84   Resp: 18   Temp: 98 °F (36.7 °C)   TempSrc: Temporal   SpO2: 98%   Weight: 208 lb (94.3 kg)   Height: 5' 8\" (1.727 m)       Gen: Oriented to person, place and time and well-developed, well-nourished and in no distress. HEENT:    Head: normocephalic and atraumatic. Eyes:  EOM are normal. Pupils equal and round. Neck:  Normal range of motion. Neck supple. Cardiovascular: normal rate, regular rhythm and normal heart sounds. Pulmonary/Chest:  Effort normal and breath sounds normal.  No respiratory distress. No wheezes, no rales. Abdominal: soft, normal  bowel sounds. Musculoskeletal:  No edema, no tenderness. No calf tenderness or edema. Neurological:  Alert, oriented to person, place and time. Skin: skin is warm and dry. R hand: minimal non pitting edema      Assessment/ Plan:     Follow-up and Dispositions    Return in about 6 months (around 8/8/2023). Discussed hold plavix 7 days prior to colonoscopy    1.  Essential hypertension    - METABOLIC PANEL, BASIC; Future    2. Mixed hyperlipidemia      3. Nocturia    - PSA W/ REFLX FREE PSA; Future    4. History of CVA (cerebrovascular accident)      5. Stutter  Cont speech    6. Right hand weakness  Cont OT, discussed swelling, recc arm sleeve , cont home exercises      I have discussed the diagnosis with the patient and the intended plan as seen in the above orders. The patient has received an after-visit summary and questions were answered concerning future plans. Pt conveyed understanding of plan. Medication Side Effects and Warnings were discussed with patient: yes  Patient Labs were reviewed: yes  Patient Past Records were reviewed:  yes    Claudell Ferrara.  Shantelle Singleton NP

## 2023-02-09 LAB
ANION GAP SERPL CALC-SCNC: 5 MMOL/L (ref 5–15)
BUN SERPL-MCNC: 12 MG/DL (ref 6–20)
BUN/CREAT SERPL: 15 (ref 12–20)
CALCIUM SERPL-MCNC: 9.4 MG/DL (ref 8.5–10.1)
CHLORIDE SERPL-SCNC: 109 MMOL/L (ref 97–108)
CO2 SERPL-SCNC: 26 MMOL/L (ref 21–32)
CREAT SERPL-MCNC: 0.81 MG/DL (ref 0.7–1.3)
GLUCOSE SERPL-MCNC: 116 MG/DL (ref 65–100)
POTASSIUM SERPL-SCNC: 3.6 MMOL/L (ref 3.5–5.1)
SODIUM SERPL-SCNC: 140 MMOL/L (ref 136–145)

## 2023-02-10 LAB
PSA SERPL-MCNC: 1.3 NG/ML (ref 0–4)
REFLEX CRITERIA: NORMAL

## 2023-02-14 NOTE — PROGRESS NOTES
Chief Complaint   Patient presents with    Follow-up    Stroke     Patient reports right arm weakness       HPI    Mr Sandra Moses is a 59 year old male here for follow up. He is a new patient for me. He was last seen in the office by Dr Sergei Marinelli on 9/12/22 for chronic left MCA stroke that happened in July, 2022. He also has right vertebral artery stenosis, hx of SDH with crani, and s/p seizures. He is on ASA and Plavix daily. He is on baclofen and gabapentin for right sided spacticity. He is here today with his wife. He established with a PCP and saw them last month. He is doing well since last visit. He is still in OT and SLP. He feels he is doing well. He still gets frustrated with his speech and stuttering. He is doing well on his meds, no side effects, His right arm is still weak and spastic, but no pain. He is not taking the gabapentin anymore. Denies falls. His wife says he started driving again in January but not far. He is still asking when he can go back to work. He never completed a driving eval.           Background:  Yessi Knight is a 28-year-old gentleman who tells me he has a history of traumatic brain injury who had surgery to the left side of his head. 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 Per the records it seems like he has had surgery before that. He is here because yesterday he had some speech difficulty which was different from his baseline. He still does not feel back to his complete normal.  No headache or double vision. No weakness. 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 He was unaware of this. He does not have a history of seizures he knows of. He is never had a convulsive event. Review of Systems   Unable to perform ROS: Language   Eyes:  Negative for double vision and photophobia. Musculoskeletal:  Negative for falls. Neurological:  Positive for speech change and focal weakness. Negative for seizures. Psychiatric/Behavioral:  The patient does not have insomnia. Past Medical History:   Diagnosis Date    Hypertension     Stroke [facility])      Family History   Problem Relation Age of Onset    Hypertension Mother     Dementia Mother     Hypertension Father      Social History     Socioeconomic History    Marital status:      Spouse name: Not on file    Number of children: Not on file    Years of education: Not on file    Highest education level: Not on file   Occupational History    Not on file   Tobacco Use    Smoking status: Never    Smokeless tobacco: Never   Vaping Use    Vaping Use: Never used   Substance and Sexual Activity    Alcohol use: No    Drug use: No    Sexual activity: Yes     Partners: Female   Other Topics Concern    Not on file   Social History Narrative    Not on file     Social Determinants of Health     Financial Resource Strain: Low Risk     Difficulty of Paying Living Expenses: Not hard at all   Food Insecurity: No Food Insecurity    Worried About Running Out of Food in the Last Year: Never true    Ran Out of Food in the Last Year: Never true   Transportation Needs: Not on file   Physical Activity: Not on file   Stress: Not on file   Social Connections: Not on file   Intimate Partner Violence: Not on file   Housing Stability: Not on file     No Known Allergies      Current Outpatient Medications   Medication Sig    clopidogreL (PLAVIX) 75 mg tab Take 1 Tablet by mouth daily. baclofen (LIORESAL) 20 mg tablet Take 20 mg by mouth three (3) times daily. aspirin 81 mg chewable tablet Take 81 mg by mouth daily. amLODIPine (NORVASC) 10 mg tablet Take 1 Tablet by mouth daily. losartan (COZAAR) 50 mg tablet Take 1 Tablet by mouth every twelve (12) hours. atorvastatin (LIPITOR) 40 mg tablet Take 1 Tablet by mouth nightly. No current facility-administered medications for this visit. Neurologic Exam     Mental Status   Speech: slurred   WD/WN adult in NAD, normal grooming  VSS  A&O x 3, speech is slow with some stuttering    PERRL, nonicteric  Face is slight right asymmetry, tongue midline  Speech is with mild aphasia motor  No limb ataxia. No abnl movements. Right upper extremity drift, decreased   Reduced arm swing on the right  Gait is better         Visit Vitals  /74 (BP 1 Location: Left arm, BP Patient Position: Sitting, BP Cuff Size: Adult)   Pulse 68   Resp 17   Ht 5' 8\" (1.727 m)   Wt 208 lb (94.3 kg)   SpO2 98%   BMI 31.63 kg/m²     Follow-up and Dispositions    Return in about 6 months (around 8/15/2023). Assessment and Plan   Diagnoses and all orders for this visit:    1. Chronic ischemic left MCA stroke  -     clopidogreL (PLAVIX) 75 mg tab; Take 1 Tablet by mouth daily. 2. Spastic hemiparesis of right dominant side as late effect of cerebrovascular disease ([facility] [address]      59 year old male here for follow up of left MCA stroke and spastic hemiparesis of right side. He is doing well since last visit. Continue with the plan, I am not changing any medication today. Continue the baclofen to help that right arm. He looks really good. His gait is good. He is able to move that right arm. I did suggest that he continues with his therapy before returning back to work. I worry about him getting more frustrated with himself because he cant do everything. I explained that he may need to embrace his new normal. He has come a long way. He still may need a driving eval for work purposes and he can do that through OT at [facility]. He understands stroke s/s. I will see him back in 6 months. I spent 45 minutes of time today reviewing the medical record and notes, imaging, examining the patient, and face-to-face time, patient/family teaching and medication side effects, and time spent completing documentation.       EMANI Retana

## 2023-02-15 ENCOUNTER — OFFICE VISIT (OUTPATIENT)
Dept: NEUROLOGY | Age: 58
End: 2023-02-15
Payer: COMMERCIAL

## 2023-02-15 VITALS
SYSTOLIC BLOOD PRESSURE: 116 MMHG | OXYGEN SATURATION: 98 % | RESPIRATION RATE: 17 BRPM | DIASTOLIC BLOOD PRESSURE: 74 MMHG | HEIGHT: 68 IN | BODY MASS INDEX: 31.52 KG/M2 | HEART RATE: 68 BPM | WEIGHT: 208 LBS

## 2023-02-15 DIAGNOSIS — I69.30 CHRONIC ISCHEMIC LEFT MCA STROKE: Primary | ICD-10-CM

## 2023-02-15 DIAGNOSIS — I69.951 SPASTIC HEMIPARESIS OF RIGHT DOMINANT SIDE AS LATE EFFECT OF CEREBROVASCULAR DISEASE (HCC): ICD-10-CM

## 2023-02-15 PROCEDURE — 99215 OFFICE O/P EST HI 40 MIN: CPT

## 2023-02-15 RX ORDER — BACLOFEN 20 MG/1
20 TABLET ORAL 3 TIMES DAILY
COMMUNITY

## 2023-02-15 RX ORDER — CLOPIDOGREL BISULFATE 75 MG/1
75 TABLET ORAL DAILY
Qty: 90 TABLET | Refills: 3 | Status: SHIPPED | OUTPATIENT
Start: 2023-02-15

## 2023-02-15 NOTE — PROGRESS NOTES
Chief Complaint   Patient presents with    Follow-up    Stroke     Patient reports right arm weakness    Visit Vitals  /74 (BP 1 Location: Left arm, BP Patient Position: Sitting, BP Cuff Size: Adult)   Pulse 68   Resp 17   Ht 5' 8\" (1.727 m)   Wt 208 lb (94.3 kg)   SpO2 98%   BMI 31.63 kg/m²

## 2023-04-12 ENCOUNTER — TELEPHONE (OUTPATIENT)
Dept: NEUROLOGY | Age: 58
End: 2023-04-12

## 2023-06-05 RX ORDER — BACLOFEN 20 MG/1
20 TABLET ORAL 3 TIMES DAILY PRN
Qty: 90 TABLET | Refills: 1 | Status: SHIPPED | OUTPATIENT
Start: 2023-06-05

## 2023-06-19 RX ORDER — LOSARTAN POTASSIUM 50 MG/1
TABLET ORAL
Qty: 180 TABLET | Refills: 0 | Status: SHIPPED | OUTPATIENT
Start: 2023-06-19

## 2023-06-19 RX ORDER — AMLODIPINE BESYLATE 10 MG/1
TABLET ORAL
Qty: 90 TABLET | Refills: 0 | Status: SHIPPED | OUTPATIENT
Start: 2023-06-19

## 2023-06-19 NOTE — TELEPHONE ENCOUNTER
Last appointment: 02/082023 NP Kassandra Sales   Next appointment: 08/09/2023 NP Kassandra Sales   Previous refill encounter(s):   01/03/2023:  - Cozaar #180 with 1 refill,   - Norvasc #90 with 1 refill.      For Pharmacy Admin Tracking Only    Program: Medication Refill  Intervention Detail: New Rx: 2, reason: Patient Preference  Time Spent (min): 5      Requested Prescriptions     Pending Prescriptions Disp Refills    amLODIPine (NORVASC) 10 MG tablet [Pharmacy Med Name: amLODIPine Besylate 10 MG Oral Tablet] 90 tablet 0     Sig: Take 1 tablet by mouth once daily    losartan (COZAAR) 50 MG tablet [Pharmacy Med Name: Losartan Potassium 50 MG Oral Tablet] 180 tablet 0     Sig: TAKE 1 TABLET BY MOUTH EVERY 12 HOURS

## 2023-06-23 RX ORDER — ATORVASTATIN CALCIUM 40 MG/1
40 TABLET, FILM COATED ORAL NIGHTLY
Qty: 90 TABLET | Refills: 0 | Status: SHIPPED | OUTPATIENT
Start: 2023-06-23

## 2023-06-23 NOTE — TELEPHONE ENCOUNTER
Pt is calling for rx refill for atorvastatin to be sent to [facility].   Pt # [phone]

## 2023-08-09 ENCOUNTER — OFFICE VISIT (OUTPATIENT)
Facility: CLINIC | Age: 58
End: 2023-08-09
Payer: COMMERCIAL

## 2023-08-09 VITALS
RESPIRATION RATE: 19 BRPM | HEART RATE: 68 BPM | SYSTOLIC BLOOD PRESSURE: 122 MMHG | DIASTOLIC BLOOD PRESSURE: 72 MMHG | BODY MASS INDEX: 31.52 KG/M2 | WEIGHT: 208 LBS | OXYGEN SATURATION: 100 % | HEIGHT: 68 IN | TEMPERATURE: 97 F

## 2023-08-09 DIAGNOSIS — Z12.11 COLON CANCER SCREENING: ICD-10-CM

## 2023-08-09 DIAGNOSIS — I10 PRIMARY HYPERTENSION: ICD-10-CM

## 2023-08-09 DIAGNOSIS — R73.09 ELEVATED GLUCOSE: ICD-10-CM

## 2023-08-09 DIAGNOSIS — I69.328 CVA, OLD, SPEECH/LANGUAGE DEFICIT: ICD-10-CM

## 2023-08-09 DIAGNOSIS — R60.9 DEPENDENT EDEMA: ICD-10-CM

## 2023-08-09 DIAGNOSIS — N52.9 ERECTILE DYSFUNCTION, UNSPECIFIED ERECTILE DYSFUNCTION TYPE: ICD-10-CM

## 2023-08-09 DIAGNOSIS — R73.09 ELEVATED GLUCOSE: Primary | ICD-10-CM

## 2023-08-09 PROCEDURE — 3074F SYST BP LT 130 MM HG: CPT | Performed by: NURSE PRACTITIONER

## 2023-08-09 PROCEDURE — 99214 OFFICE O/P EST MOD 30 MIN: CPT | Performed by: NURSE PRACTITIONER

## 2023-08-09 PROCEDURE — 3078F DIAST BP <80 MM HG: CPT | Performed by: NURSE PRACTITIONER

## 2023-08-09 RX ORDER — TADALAFIL 5 MG/1
5 TABLET ORAL PRN
Qty: 30 TABLET | Refills: 1 | Status: SHIPPED | OUTPATIENT
Start: 2023-08-09

## 2023-08-09 SDOH — ECONOMIC STABILITY: HOUSING INSECURITY
IN THE LAST 12 MONTHS, WAS THERE A TIME WHEN YOU DID NOT HAVE A STEADY PLACE TO SLEEP OR SLEPT IN A SHELTER (INCLUDING NOW)?: NO

## 2023-08-09 SDOH — ECONOMIC STABILITY: FOOD INSECURITY: WITHIN THE PAST 12 MONTHS, THE FOOD YOU BOUGHT JUST DIDN'T LAST AND YOU DIDN'T HAVE MONEY TO GET MORE.: NEVER TRUE

## 2023-08-09 SDOH — ECONOMIC STABILITY: FOOD INSECURITY: WITHIN THE PAST 12 MONTHS, YOU WORRIED THAT YOUR FOOD WOULD RUN OUT BEFORE YOU GOT MONEY TO BUY MORE.: NEVER TRUE

## 2023-08-09 SDOH — ECONOMIC STABILITY: INCOME INSECURITY: HOW HARD IS IT FOR YOU TO PAY FOR THE VERY BASICS LIKE FOOD, HOUSING, MEDICAL CARE, AND HEATING?: NOT HARD AT ALL

## 2023-08-09 ASSESSMENT — PATIENT HEALTH QUESTIONNAIRE - PHQ9
SUM OF ALL RESPONSES TO PHQ QUESTIONS 1-9: 0
2. FEELING DOWN, DEPRESSED OR HOPELESS: 0
SUM OF ALL RESPONSES TO PHQ9 QUESTIONS 1 & 2: 0
SUM OF ALL RESPONSES TO PHQ QUESTIONS 1-9: 0
SUM OF ALL RESPONSES TO PHQ QUESTIONS 1-9: 0
1. LITTLE INTEREST OR PLEASURE IN DOING THINGS: 0
SUM OF ALL RESPONSES TO PHQ QUESTIONS 1-9: 0

## 2023-08-09 NOTE — PROGRESS NOTES
Subjective: (As above and below)     Chief Complaint   Patient presents with    Hypertension     6 months      Kris Jenifer Oliver is a 59y.o. year old male who presents for fu    Hx of CVA a yea ago. Continues to have R sided weakness and speech difficulties  Did some PT but was holding off due to limit on insurance  He is ambulating w/o device  He has R upper ext weakness  He is R hand dominant  Unable to return to work as a , applying for disability  Previously rf'd to speech therapy but not started yet  Difficulty reading  No falls  On asa, plavix, statin    Hypertension ROS:  taking medications as instructed, no medication side effects noted, no TIAs, no chest pain on exertion, no dyspnea on exertion  Some bilat lower ext edema R>L. Unsure if improves w elevation, no cp, LACY  Diet is pretty low in sodium      BP Readings from Last 3 Encounters:   08/09/23 122/72   02/15/23 116/74   02/08/23 111/73     ED: difficulty maintaining erection, since CVA and BP meds      Colon cancer screening:  due    Reviewed PmHx, RxHx, FmHx, SocHx, AllgHx and updated in chart.   Family History   Problem Relation Age of Onset    Dementia Mother     Hypertension Father     Hypertension Mother        Past Medical History:   Diagnosis Date    Hypertension     Stroke [facility])       Social History     Socioeconomic History    Marital status:      Spouse name: None    Number of children: None    Years of education: None    Highest education level: None   Tobacco Use    Smoking status: Never    Smokeless tobacco: Never   Substance and Sexual Activity    Alcohol use: No    Drug use: No     Social Determinants of Health     Financial Resource Strain: Low Risk     Difficulty of Paying Living Expenses: Not hard at all   Food Insecurity: No Food Insecurity    Worried About Running Out of Food in the Last Year: Never true    Ran Out of Food in the Last Year: Never true   Transportation Needs: Unknown    Lack of Transportation

## 2023-08-09 NOTE — PROGRESS NOTES
Pt is here for   Chief Complaint   Patient presents with    Hypertension     6 months      1. Have you been to the ER, urgent care clinic since your last visit? Hospitalized since your last visit? No    2. Have you seen or consulted any other health care providers outside of the [address] [place] [address] Avenue since your last visit? Include any pap smears or colon screening.  No

## 2023-08-10 LAB
ANION GAP SERPL CALC-SCNC: 7 MMOL/L (ref 5–15)
BUN SERPL-MCNC: 9 MG/DL (ref 6–20)
BUN/CREAT SERPL: 10 (ref 12–20)
CALCIUM SERPL-MCNC: 9.8 MG/DL (ref 8.5–10.1)
CHLORIDE SERPL-SCNC: 107 MMOL/L (ref 97–108)
CO2 SERPL-SCNC: 23 MMOL/L (ref 21–32)
CREAT SERPL-MCNC: 0.9 MG/DL (ref 0.7–1.3)
EST. AVERAGE GLUCOSE BLD GHB EST-MCNC: 111 MG/DL
GLUCOSE SERPL-MCNC: 111 MG/DL (ref 65–100)
HBA1C MFR BLD: 5.5 % (ref 4–5.6)
POTASSIUM SERPL-SCNC: 3.9 MMOL/L (ref 3.5–5.1)
SODIUM SERPL-SCNC: 137 MMOL/L (ref 136–145)

## 2023-08-10 RX ORDER — BACLOFEN 20 MG/1
TABLET ORAL
Qty: 90 TABLET | Refills: 0 | Status: SHIPPED | OUTPATIENT
Start: 2023-08-10

## 2023-08-10 NOTE — TELEPHONE ENCOUNTER
[facility] [address] [facility]

## 2023-09-14 ENCOUNTER — TELEPHONE (OUTPATIENT)
Facility: CLINIC | Age: 58
End: 2023-09-14

## 2023-09-14 RX ORDER — BACLOFEN 20 MG/1
20 TABLET ORAL 3 TIMES DAILY PRN
Qty: 90 TABLET | Refills: 0 | Status: SHIPPED | OUTPATIENT
Start: 2023-09-14

## 2023-09-15 RX ORDER — ATORVASTATIN CALCIUM 40 MG/1
40 TABLET, FILM COATED ORAL NIGHTLY
Qty: 90 TABLET | Refills: 0 | Status: SHIPPED | OUTPATIENT
Start: 2023-09-15

## 2023-09-15 RX ORDER — AMLODIPINE BESYLATE 10 MG/1
TABLET ORAL
Qty: 90 TABLET | Refills: 0 | Status: SHIPPED | OUTPATIENT
Start: 2023-09-15

## 2023-09-15 RX ORDER — LOSARTAN POTASSIUM 50 MG/1
TABLET ORAL
Qty: 180 TABLET | Refills: 0 | Status: SHIPPED | OUTPATIENT
Start: 2023-09-15

## 2023-09-15 RX ORDER — BACLOFEN 20 MG/1
TABLET ORAL
Qty: 90 TABLET | Refills: 0 | OUTPATIENT
Start: 2023-09-15

## 2023-09-28 ENCOUNTER — OFFICE VISIT (OUTPATIENT)
Age: 58
End: 2023-09-28
Payer: COMMERCIAL

## 2023-09-28 VITALS
HEART RATE: 59 BPM | WEIGHT: 208 LBS | SYSTOLIC BLOOD PRESSURE: 127 MMHG | HEIGHT: 68 IN | DIASTOLIC BLOOD PRESSURE: 73 MMHG | OXYGEN SATURATION: 98 % | BODY MASS INDEX: 31.52 KG/M2 | RESPIRATION RATE: 17 BRPM

## 2023-09-28 DIAGNOSIS — I69.951 HEMIPLEGIA AND HEMIPARESIS FOLLOWING UNSPECIFIED CEREBROVASCULAR DISEASE AFFECTING RIGHT DOMINANT SIDE (HCC): Primary | ICD-10-CM

## 2023-09-28 PROCEDURE — 99215 OFFICE O/P EST HI 40 MIN: CPT

## 2023-09-28 RX ORDER — CLOPIDOGREL BISULFATE 75 MG/1
75 TABLET ORAL DAILY
Qty: 90 TABLET | Refills: 1 | Status: SHIPPED | OUTPATIENT
Start: 2023-09-28

## 2023-09-28 RX ORDER — BACLOFEN 20 MG/1
20 TABLET ORAL 3 TIMES DAILY PRN
Qty: 90 TABLET | Refills: 5 | Status: SHIPPED | OUTPATIENT
Start: 2023-09-28

## 2023-09-28 ASSESSMENT — PATIENT HEALTH QUESTIONNAIRE - PHQ9
SUM OF ALL RESPONSES TO PHQ QUESTIONS 1-9: 0
2. FEELING DOWN, DEPRESSED OR HOPELESS: 0
1. LITTLE INTEREST OR PLEASURE IN DOING THINGS: 0
SUM OF ALL RESPONSES TO PHQ9 QUESTIONS 1 & 2: 0
SUM OF ALL RESPONSES TO PHQ QUESTIONS 1-9: 0

## 2023-09-28 NOTE — PROGRESS NOTES
Chief Complaint   Patient presents with    Follow-up    Cerebrovascular Accident      Vitals:    09/28/23 1358   BP: 127/73   Pulse: 59   Resp: 17   SpO2: 98%
head CT:    Stable chronic left-sided ischemic injuries with encephalomalacia. Stable  scattered dystrophic calcifications. No midline shift. Subtle small  hypoattenuation in the left corona radiata compatible with acute infarction is  again noted. No evidence for any new loss of gray-white matter differentiation. The basal cisterns are patent. CTA NECK:    Great vessels: Patent origins    Right subclavian artery: Patent    Left subclavian artery: Patent    Right common carotid artery: Patent    Left common carotid artery: Patent    Cervical right internal carotid artery: Patent with no significant stenosis by  NASCET criteria. Cervical left internal carotid artery: Patent with mild proximal atherosclerosis  causing no significant stenosis by NASCET criteria. Right vertebral artery: Patent with unchanged moderate to severe ostial stenosis    Left vertebral artery: Patent    Mild to moderate degenerative changes in the cervical spine    CTA HEAD:    Right cavernous internal carotid artery: Patent with mild to moderate  atherosclerosis    Left cavernous internal carotid artery: Patent with moderate atherosclerosis    Anterior cerebral arteries: Patent with mild atherosclerosis    Anterior communicating artery: Patent    Right middle cerebral artery: Patent with mild atherosclerosis    Left middle cerebral artery: Patent with mild to moderate atherosclerosis    Posterior communicating arteries: Patent    Posterior cerebral arteries: Patent    Basilar artery: Patent    Distal vertebral arteries: Patent with unchanged moderate distal right-sided  atherosclerosis    CT perfusion brain: No significant cerebral perfusion abnormality or mismatch    Measurements use NASCET criteria. Impression  Stable exam without evidence for acute large vessel arterial occlusion. No  significant cerebral perfusion abnormality or mismatch. MRI Results (maximum last 3):   MRI Result (most recent):  MRI BRAIN W WO CONTRAST

## 2023-12-12 RX ORDER — ATORVASTATIN CALCIUM 40 MG/1
40 TABLET, FILM COATED ORAL NIGHTLY
Qty: 90 TABLET | Refills: 0 | Status: SHIPPED | OUTPATIENT
Start: 2023-12-12

## 2023-12-12 NOTE — TELEPHONE ENCOUNTER
Last appointment: 08/09/2023 DANY Gallegos   Next appointment: Nothing scheduled   Previous refill encounter(s):   09/15/2023 Lipitor #90     For Pharmacy Admin Tracking Only    Program: Medication Refill  Intervention Detail: New Rx: 1, reason: Patient Preference  Time Spent (min): 5      Requested Prescriptions     Pending Prescriptions Disp Refills    atorvastatin (LIPITOR) 40 MG tablet [Pharmacy Med Name: Atorvastatin Calcium 40 MG Oral Tablet] 90 tablet 0     Sig: Take 1 tablet by mouth nightly

## 2023-12-14 RX ORDER — AMLODIPINE BESYLATE 10 MG/1
TABLET ORAL
Qty: 90 TABLET | Refills: 0 | Status: SHIPPED | OUTPATIENT
Start: 2023-12-14

## 2024-01-02 RX ORDER — LOSARTAN POTASSIUM 50 MG/1
TABLET ORAL
Qty: 180 TABLET | Refills: 0 | Status: SHIPPED | OUTPATIENT
Start: 2024-01-02

## 2024-01-22 ENCOUNTER — HOSPITAL ENCOUNTER (EMERGENCY)
Facility: HOSPITAL | Age: 59
Discharge: HOME | End: 2024-01-22
Attending: STUDENT IN AN ORGANIZED HEALTH CARE EDUCATION/TRAINING PROGRAM

## 2024-01-22 ENCOUNTER — APPOINTMENT (OUTPATIENT)
Facility: HOSPITAL | Age: 59
End: 2024-01-22

## 2024-01-22 VITALS
HEIGHT: 69 IN | WEIGHT: 214.95 LBS | OXYGEN SATURATION: 96 % | BODY MASS INDEX: 31.84 KG/M2 | TEMPERATURE: 98.2 F | SYSTOLIC BLOOD PRESSURE: 133 MMHG | DIASTOLIC BLOOD PRESSURE: 80 MMHG | RESPIRATION RATE: 20 BRPM | HEART RATE: 76 BPM

## 2024-01-22 DIAGNOSIS — R06.02 SHORTNESS OF BREATH: Primary | ICD-10-CM

## 2024-01-22 DIAGNOSIS — R09.81 NASAL CONGESTION: ICD-10-CM

## 2024-01-22 DIAGNOSIS — J06.9 UPPER RESPIRATORY TRACT INFECTION, UNSPECIFIED TYPE: ICD-10-CM

## 2024-01-22 LAB
ALBUMIN SERPL-MCNC: 3.4 G/DL (ref 3.5–5)
ALBUMIN/GLOB SERPL: 0.7 (ref 1.1–2.2)
ALP SERPL-CCNC: 100 U/L (ref 45–117)
ALT SERPL-CCNC: 18 U/L (ref 12–78)
ANION GAP SERPL CALC-SCNC: 3 MMOL/L (ref 5–15)
AST SERPL-CCNC: 9 U/L (ref 15–37)
BASOPHILS # BLD: 0.1 K/UL (ref 0–0.1)
BASOPHILS NFR BLD: 1 % (ref 0–1)
BILIRUB SERPL-MCNC: 0.6 MG/DL (ref 0.2–1)
BUN SERPL-MCNC: 12 MG/DL (ref 6–20)
BUN/CREAT SERPL: 13 (ref 12–20)
CALCIUM SERPL-MCNC: 8.6 MG/DL (ref 8.5–10.1)
CHLORIDE SERPL-SCNC: 109 MMOL/L (ref 97–108)
CO2 SERPL-SCNC: 28 MMOL/L (ref 21–32)
COMMENT:: NORMAL
CREAT SERPL-MCNC: 0.93 MG/DL (ref 0.7–1.3)
DIFFERENTIAL METHOD BLD: NORMAL
EOSINOPHIL # BLD: 0.2 K/UL (ref 0–0.4)
EOSINOPHIL NFR BLD: 2 % (ref 0–7)
ERYTHROCYTE [DISTWIDTH] IN BLOOD BY AUTOMATED COUNT: 12.9 % (ref 11.5–14.5)
FLUAV AG NPH QL IA: NEGATIVE
FLUBV AG NOSE QL IA: NEGATIVE
GLOBULIN SER CALC-MCNC: 4.6 G/DL (ref 2–4)
GLUCOSE SERPL-MCNC: 115 MG/DL (ref 65–100)
HCT VFR BLD AUTO: 40 % (ref 36.6–50.3)
HGB BLD-MCNC: 13.7 G/DL (ref 12.1–17)
IMM GRANULOCYTES # BLD AUTO: 0 K/UL (ref 0–0.04)
IMM GRANULOCYTES NFR BLD AUTO: 0 % (ref 0–0.5)
LYMPHOCYTES # BLD: 1.7 K/UL (ref 0.8–3.5)
LYMPHOCYTES NFR BLD: 17 % (ref 12–49)
MCH RBC QN AUTO: 32.5 PG (ref 26–34)
MCHC RBC AUTO-ENTMCNC: 34.3 G/DL (ref 30–36.5)
MCV RBC AUTO: 94.8 FL (ref 80–99)
MONOCYTES # BLD: 0.9 K/UL (ref 0–1)
MONOCYTES NFR BLD: 9 % (ref 5–13)
NEUTS SEG # BLD: 7.4 K/UL (ref 1.8–8)
NEUTS SEG NFR BLD: 71 % (ref 32–75)
NRBC # BLD: 0 K/UL (ref 0–0.01)
NRBC BLD-RTO: 0 PER 100 WBC
NT PRO BNP: 29 PG/ML
PLATELET # BLD AUTO: 211 K/UL (ref 150–400)
PMV BLD AUTO: 10.1 FL (ref 8.9–12.9)
POTASSIUM SERPL-SCNC: 3.6 MMOL/L (ref 3.5–5.1)
PROT SERPL-MCNC: 8 G/DL (ref 6.4–8.2)
RBC # BLD AUTO: 4.22 M/UL (ref 4.1–5.7)
SARS-COV-2 RDRP RESP QL NAA+PROBE: NOT DETECTED
SODIUM SERPL-SCNC: 140 MMOL/L (ref 136–145)
SOURCE: NORMAL
SPECIMEN HOLD: NORMAL
WBC # BLD AUTO: 10.3 K/UL (ref 4.1–11.1)

## 2024-01-22 PROCEDURE — 85025 COMPLETE CBC W/AUTO DIFF WBC: CPT

## 2024-01-22 PROCEDURE — 80053 COMPREHEN METABOLIC PANEL: CPT

## 2024-01-22 PROCEDURE — 83880 ASSAY OF NATRIURETIC PEPTIDE: CPT

## 2024-01-22 PROCEDURE — 87804 INFLUENZA ASSAY W/OPTIC: CPT

## 2024-01-22 PROCEDURE — 71250 CT THORAX DX C-: CPT

## 2024-01-22 PROCEDURE — 87635 SARS-COV-2 COVID-19 AMP PRB: CPT

## 2024-01-22 PROCEDURE — 99284 EMERGENCY DEPT VISIT MOD MDM: CPT

## 2024-01-22 RX ORDER — FLUTICASONE PROPIONATE 50 MCG
2 SPRAY, SUSPENSION (ML) NASAL DAILY
Qty: 16 G | Refills: 0 | Status: SHIPPED | OUTPATIENT
Start: 2024-01-22

## 2024-01-22 RX ORDER — ACETAMINOPHEN AND CHLORPHENIRAMINE MALEATE 325; 2 MG/1; MG/1
2 TABLET, FILM COATED ORAL EVERY 4 HOURS PRN
Qty: 80 TABLET | Refills: 0 | Status: SHIPPED | OUTPATIENT
Start: 2024-01-22 | End: 2024-01-29

## 2024-01-22 RX ORDER — BENZONATATE 100 MG/1
100 CAPSULE ORAL 3 TIMES DAILY PRN
Qty: 30 CAPSULE | Refills: 0 | Status: SHIPPED | OUTPATIENT
Start: 2024-01-22 | End: 2024-02-01

## 2024-01-22 ASSESSMENT — ENCOUNTER SYMPTOMS
SORE THROAT: 1
SHORTNESS OF BREATH: 1
COUGH: 1

## 2024-01-22 ASSESSMENT — PAIN - FUNCTIONAL ASSESSMENT: PAIN_FUNCTIONAL_ASSESSMENT: NONE - DENIES PAIN

## 2024-01-23 NOTE — ED PROVIDER NOTES
depressions. [CC]      ED Course User Index  [CC] Frederic Gandhi, DO           CONSULTS:  None    PROCEDURES:  Unless otherwise noted below, none     Procedures      DIFFERENTIAL DIAGNOSIS/MDM:   Medical Decision Making   58-year-old male who is well-appearing and in no acute distress presents today with complaints of shortness of breath.  He only has shortness of breath when he is lying flat and feels like his congestion causes him to gag and not be able to breathe well.  He does have an associated cough, sore throat and bodyaches.  Presentation most likely a viral etiology.  His lungs are clear on exam.  His throat is erythematous without signs of abscess or exudate.  His lab work shows no leukocytosis or anemia, normal renal function and electrolytes and a normal BNP.  CT of his chest was ordered by the provider in triage and showed no acute process such as pulmonary edema or pneumonia.  I have low suspicion for PE or cardiac etiology.  I suspect this is viral in nature and he can go home on Coricidin, Tessalon for the cough and Flonase.  Advised him to put a humidifier by his bed to help with his nasal congestion.  Patient appropriate for discharge home    Problems Addressed:  Nasal congestion: acute illness or injury  Shortness of breath: acute illness or injury  Upper respiratory tract infection, unspecified type: acute illness or injury    Amount and/or Complexity of Data Reviewed  Labs: ordered. Decision-making details documented in ED Course.  Radiology: ordered. Decision-making details documented in ED Course.    Risk  OTC drugs.  Prescription drug management.          FINAL IMPRESSION      1. Shortness of breath    2. Nasal congestion    3. Upper respiratory tract infection, unspecified type          DISPOSITION/PLAN   DISPOSITION Decision To Discharge 01/22/2024 07:05:49 PM          (Please note that portions of this note were completed with a voice recognition program.  Efforts were made to edit

## 2024-01-28 ENCOUNTER — HOSPITAL ENCOUNTER (EMERGENCY)
Facility: HOSPITAL | Age: 59
Discharge: HOME | End: 2024-01-28
Attending: EMERGENCY MEDICINE

## 2024-01-28 ENCOUNTER — APPOINTMENT (OUTPATIENT)
Facility: HOSPITAL | Age: 59
End: 2024-01-28

## 2024-01-28 VITALS
BODY MASS INDEX: 32.34 KG/M2 | WEIGHT: 213.41 LBS | DIASTOLIC BLOOD PRESSURE: 82 MMHG | HEIGHT: 68 IN | OXYGEN SATURATION: 95 % | HEART RATE: 84 BPM | TEMPERATURE: 98.3 F | RESPIRATION RATE: 25 BRPM | SYSTOLIC BLOOD PRESSURE: 131 MMHG

## 2024-01-28 DIAGNOSIS — R05.2 SUBACUTE COUGH: Primary | ICD-10-CM

## 2024-01-28 LAB
ALBUMIN SERPL-MCNC: 3.3 G/DL (ref 3.5–5)
ALBUMIN/GLOB SERPL: 0.7 (ref 1.1–2.2)
ALP SERPL-CCNC: 102 U/L (ref 45–117)
ALT SERPL-CCNC: 21 U/L (ref 12–78)
ANION GAP SERPL CALC-SCNC: 1 MMOL/L (ref 5–15)
AST SERPL-CCNC: 17 U/L (ref 15–37)
BASOPHILS # BLD: 0.1 K/UL (ref 0–0.1)
BASOPHILS NFR BLD: 1 % (ref 0–1)
BILIRUB SERPL-MCNC: 0.3 MG/DL (ref 0.2–1)
BUN SERPL-MCNC: 10 MG/DL (ref 6–20)
BUN/CREAT SERPL: 8 (ref 12–20)
CALCIUM SERPL-MCNC: 8.4 MG/DL (ref 8.5–10.1)
CHLORIDE SERPL-SCNC: 106 MMOL/L (ref 97–108)
CO2 SERPL-SCNC: 29 MMOL/L (ref 21–32)
COMMENT:: NORMAL
COMMENT:: NORMAL
CREAT SERPL-MCNC: 1.21 MG/DL (ref 0.7–1.3)
DIFFERENTIAL METHOD BLD: NORMAL
EKG ATRIAL RATE: 74 BPM
EKG DIAGNOSIS: NORMAL
EKG P AXIS: 48 DEGREES
EKG P-R INTERVAL: 152 MS
EKG Q-T INTERVAL: 380 MS
EKG QRS DURATION: 88 MS
EKG QTC CALCULATION (BAZETT): 421 MS
EKG R AXIS: 40 DEGREES
EKG T AXIS: -5 DEGREES
EKG VENTRICULAR RATE: 74 BPM
EOSINOPHIL # BLD: 0.3 K/UL (ref 0–0.4)
EOSINOPHIL NFR BLD: 4 % (ref 0–7)
ERYTHROCYTE [DISTWIDTH] IN BLOOD BY AUTOMATED COUNT: 12.1 % (ref 11.5–14.5)
GLOBULIN SER CALC-MCNC: 4.9 G/DL (ref 2–4)
GLUCOSE SERPL-MCNC: 95 MG/DL (ref 65–100)
HCT VFR BLD AUTO: 39 % (ref 36.6–50.3)
HGB BLD-MCNC: 13.3 G/DL (ref 12.1–17)
IMM GRANULOCYTES # BLD AUTO: 0 K/UL (ref 0–0.04)
IMM GRANULOCYTES NFR BLD AUTO: 0 % (ref 0–0.5)
LYMPHOCYTES # BLD: 1.9 K/UL (ref 0.8–3.5)
LYMPHOCYTES NFR BLD: 23 % (ref 12–49)
MAGNESIUM SERPL-MCNC: 2.3 MG/DL (ref 1.6–2.4)
MCH RBC QN AUTO: 32.2 PG (ref 26–34)
MCHC RBC AUTO-ENTMCNC: 34.1 G/DL (ref 30–36.5)
MCV RBC AUTO: 94.4 FL (ref 80–99)
MONOCYTES # BLD: 0.8 K/UL (ref 0–1)
MONOCYTES NFR BLD: 9 % (ref 5–13)
NEUTS SEG # BLD: 5.3 K/UL (ref 1.8–8)
NEUTS SEG NFR BLD: 63 % (ref 32–75)
NRBC # BLD: 0 K/UL (ref 0–0.01)
NRBC BLD-RTO: 0 PER 100 WBC
NT PRO BNP: 12 PG/ML
PLATELET # BLD AUTO: 263 K/UL (ref 150–400)
PMV BLD AUTO: 9.4 FL (ref 8.9–12.9)
POTASSIUM SERPL-SCNC: 3.7 MMOL/L (ref 3.5–5.1)
PROT SERPL-MCNC: 8.2 G/DL (ref 6.4–8.2)
RBC # BLD AUTO: 4.13 M/UL (ref 4.1–5.7)
SODIUM SERPL-SCNC: 136 MMOL/L (ref 136–145)
SPECIMEN HOLD: NORMAL
TROPONIN I SERPL HS-MCNC: 4 NG/L (ref 0–76)
WBC # BLD AUTO: 8.4 K/UL (ref 4.1–11.1)

## 2024-01-28 PROCEDURE — 93005 ELECTROCARDIOGRAM TRACING: CPT | Performed by: EMERGENCY MEDICINE

## 2024-01-28 PROCEDURE — 83735 ASSAY OF MAGNESIUM: CPT

## 2024-01-28 PROCEDURE — 84484 ASSAY OF TROPONIN QUANT: CPT

## 2024-01-28 PROCEDURE — 71046 X-RAY EXAM CHEST 2 VIEWS: CPT

## 2024-01-28 PROCEDURE — 83880 ASSAY OF NATRIURETIC PEPTIDE: CPT

## 2024-01-28 PROCEDURE — 36415 COLL VENOUS BLD VENIPUNCTURE: CPT

## 2024-01-28 PROCEDURE — 80053 COMPREHEN METABOLIC PANEL: CPT

## 2024-01-28 PROCEDURE — 99285 EMERGENCY DEPT VISIT HI MDM: CPT

## 2024-01-28 PROCEDURE — 85025 COMPLETE CBC W/AUTO DIFF WBC: CPT

## 2024-01-28 PROCEDURE — 93010 ELECTROCARDIOGRAM REPORT: CPT | Performed by: INTERNAL MEDICINE

## 2024-01-28 ASSESSMENT — PAIN SCALES - GENERAL: PAINLEVEL_OUTOF10: 0

## 2024-01-28 NOTE — ED TRIAGE NOTES
TRIAGE: Pt arrives with c/o cough and lower extremity edema that has been worsening over the past week. Pt was seen here a week ago for SOB. Denies CP, fever, N/V/D.

## 2024-01-28 NOTE — ED PROVIDER NOTES
[facility] EMERGENCY DEP  EMERGENCY DEPARTMENT ENCOUNTER      Pt Name: Kris Newman  MRN: [id number]  Birthdate 1965  Date of evaluation: 1/28/2024  Provider: Janine Pfeiffer MD    CHIEF COMPLAINT       Chief Complaint   Patient presents with    Cough         HISTORY OF PRESENT ILLNESS    Jose A Newman is a 59 yo M with cough and shortness of breath.  He was seen in this ED 6 days ago for orthopnea and had CT chest that ws normal with no edema or pneumonia.  BNP was normal and COVID and Flu normal.  He ws discharged and for the past week has continued to have shortness of breath when lying and also cough which is productive of yellow sputum.  His wife returned him to the ED for further evaluation.            Additional history from independent historians:     Review of External Medical Records:     Nursing Notes were reviewed.    REVIEW OF SYSTEMS       Review of Systems   Constitutional:  Negative for fever.   HENT:  Negative for sore throat.    Eyes:  Negative for visual disturbance.   Respiratory:  Positive for cough and shortness of breath.    Cardiovascular:  Negative for chest pain.   Gastrointestinal:  Negative for abdominal pain.   Genitourinary:  Negative for dysuria.   Musculoskeletal:  Negative for back pain.   Skin:  Negative for rash.   Neurological:  Negative for headaches.       Except as noted above the remainder of the review of systems was reviewed and negative.       PAST MEDICAL HISTORY     Past Medical History:   Diagnosis Date    Hypertension     Stroke (HCC)          SURGICAL HISTORY       Past Surgical History:   Procedure Laterality Date    NEUROLOGICAL SURGERY           CURRENT MEDICATIONS       Previous Medications    AMLODIPINE (NORVASC) 10 MG TABLET    Take 1 tablet by mouth once daily    ASPIRIN 81 MG CHEWABLE TABLET    Take by mouth daily    ATORVASTATIN (LIPITOR) 40 MG TABLET    Take 1 tablet by mouth nightly    BACLOFEN (LIORESAL) 20 MG TABLET    Take 1 tablet by mouth 3 times daily

## 2024-01-29 ENCOUNTER — TELEPHONE (OUTPATIENT)
Facility: CLINIC | Age: 59
End: 2024-01-29

## 2024-01-29 NOTE — TELEPHONE ENCOUNTER
Patient's wife is requesting call back to receive suggestions on medications to break up patient's cough. States pt was in the hospital and went home last Monday - has been sleeping in chair ever since because it is the position that he coughs the least in.     [phone]

## 2024-03-28 ENCOUNTER — OFFICE VISIT (OUTPATIENT)
Age: 59
End: 2024-03-28

## 2024-03-28 VITALS
BODY MASS INDEX: 32.28 KG/M2 | SYSTOLIC BLOOD PRESSURE: 130 MMHG | HEART RATE: 71 BPM | HEIGHT: 68 IN | DIASTOLIC BLOOD PRESSURE: 72 MMHG | RESPIRATION RATE: 17 BRPM | WEIGHT: 213 LBS | OXYGEN SATURATION: 100 %

## 2024-03-28 DIAGNOSIS — I69.951 HEMIPLEGIA AND HEMIPARESIS FOLLOWING UNSPECIFIED CEREBROVASCULAR DISEASE AFFECTING RIGHT DOMINANT SIDE (HCC): Primary | ICD-10-CM

## 2024-03-28 DIAGNOSIS — I69.30 UNSPECIFIED SEQUELAE OF CEREBRAL INFARCTION: ICD-10-CM

## 2024-03-28 DIAGNOSIS — R47.01 EXPRESSIVE APHASIA: ICD-10-CM

## 2024-03-28 DIAGNOSIS — Z79.02 LONG TERM (CURRENT) USE OF ANTITHROMBOTICS/ANTIPLATELETS: ICD-10-CM

## 2024-03-28 PROCEDURE — 99215 OFFICE O/P EST HI 40 MIN: CPT

## 2024-03-28 RX ORDER — BACLOFEN 20 MG/1
20 TABLET ORAL 3 TIMES DAILY PRN
Qty: 90 TABLET | Refills: 5 | Status: SHIPPED | OUTPATIENT
Start: 2024-03-28

## 2024-03-28 RX ORDER — CLOPIDOGREL BISULFATE 75 MG/1
75 TABLET ORAL DAILY
Qty: 90 TABLET | Refills: 1 | Status: SHIPPED | OUTPATIENT
Start: 2024-03-28

## 2024-03-28 ASSESSMENT — PATIENT HEALTH QUESTIONNAIRE - PHQ9
SUM OF ALL RESPONSES TO PHQ QUESTIONS 1-9: 0
SUM OF ALL RESPONSES TO PHQ QUESTIONS 1-9: 0
SUM OF ALL RESPONSES TO PHQ9 QUESTIONS 1 & 2: 0
SUM OF ALL RESPONSES TO PHQ QUESTIONS 1-9: 0
2. FEELING DOWN, DEPRESSED OR HOPELESS: NOT AT ALL
SUM OF ALL RESPONSES TO PHQ QUESTIONS 1-9: 0
1. LITTLE INTEREST OR PLEASURE IN DOING THINGS: NOT AT ALL

## 2024-03-28 ASSESSMENT — ENCOUNTER SYMPTOMS: PHOTOPHOBIA: 0

## 2024-03-28 NOTE — PROGRESS NOTES
Chief Complaint   Patient presents with    Follow-up    Cerebrovascular Accident     hemiplegia      Vitals:    03/28/24 1029   BP: 130/72   Pulse: 71   Resp: 17   SpO2: 100%      
and cerebrovascular accident.    Diagnoses and all orders for this visit:    Hemiplegia and hemiparesis following unspecified cerebrovascular disease affecting right dominant side (HCC)  -     baclofen (LIORESAL) 20 MG tablet; Take 1 tablet by mouth 3 times daily as needed (muscle spasm)  -     clopidogrel (PLAVIX) 75 MG tablet; Take 1 tablet by mouth daily    Unspecified sequelae of cerebral infarction  -     baclofen (LIORESAL) 20 MG tablet; Take 1 tablet by mouth 3 times daily as needed (muscle spasm)  -     clopidogrel (PLAVIX) 75 MG tablet; Take 1 tablet by mouth daily    Expressive aphasia    Long term (current) use of antithrombotics/antiplatelets      58 year old with history of stroke with residual right sided weakness and RUE spacticity. Continue with the daily Plavix for stroke prevention. Use the Baclofen as needed for pain and stiffness. His speech is doing great. His exam today looks well. His right arm does have good strength in it. He can absolutely start going to the gym. I think that would be great for him. Get him moving and give him something to do. ED precautions were discussed. No new problems today. See him in 6 months.    I spent 45 minutes of time today reviewing the medical record and notes, imaging, examining the patient, and face-to-face time, patient/family teaching and medication side effects, and time spent completing documentation.        YOSSI Nathan - NP

## 2024-04-05 ENCOUNTER — TELEPHONE (OUTPATIENT)
Facility: CLINIC | Age: 59
End: 2024-04-05

## 2024-04-05 RX ORDER — ATORVASTATIN CALCIUM 40 MG/1
40 TABLET, FILM COATED ORAL NIGHTLY
Qty: 90 TABLET | Refills: 0 | Status: SHIPPED | OUTPATIENT
Start: 2024-04-05

## 2024-04-05 RX ORDER — ATORVASTATIN CALCIUM 40 MG/1
40 TABLET, FILM COATED ORAL NIGHTLY
Qty: 90 TABLET | Refills: 0 | Status: CANCELLED | OUTPATIENT
Start: 2024-04-05

## 2024-04-05 RX ORDER — LOSARTAN POTASSIUM 50 MG/1
50 TABLET ORAL EVERY 12 HOURS
Qty: 180 TABLET | Refills: 0 | Status: SHIPPED | OUTPATIENT
Start: 2024-04-05

## 2024-04-05 RX ORDER — AMLODIPINE BESYLATE 10 MG/1
10 TABLET ORAL DAILY
Qty: 90 TABLET | Refills: 0 | Status: SHIPPED | OUTPATIENT
Start: 2024-04-05

## 2024-05-30 ENCOUNTER — OFFICE VISIT (OUTPATIENT)
Facility: CLINIC | Age: 59
End: 2024-05-30

## 2024-05-30 VITALS
DIASTOLIC BLOOD PRESSURE: 65 MMHG | TEMPERATURE: 96.8 F | HEART RATE: 65 BPM | SYSTOLIC BLOOD PRESSURE: 107 MMHG | HEIGHT: 68 IN | RESPIRATION RATE: 18 BRPM | WEIGHT: 214.2 LBS | OXYGEN SATURATION: 96 % | BODY MASS INDEX: 32.46 KG/M2

## 2024-05-30 DIAGNOSIS — N52.9 ERECTILE DYSFUNCTION, UNSPECIFIED ERECTILE DYSFUNCTION TYPE: ICD-10-CM

## 2024-05-30 DIAGNOSIS — Z12.11 COLON CANCER SCREENING: ICD-10-CM

## 2024-05-30 DIAGNOSIS — I69.951 HEMIPLEGIA AND HEMIPARESIS FOLLOWING UNSPECIFIED CEREBROVASCULAR DISEASE AFFECTING RIGHT DOMINANT SIDE (HCC): ICD-10-CM

## 2024-05-30 DIAGNOSIS — Z86.73 HISTORY OF CVA (CEREBROVASCULAR ACCIDENT): Primary | ICD-10-CM

## 2024-05-30 DIAGNOSIS — I10 PRIMARY HYPERTENSION: ICD-10-CM

## 2024-05-30 DIAGNOSIS — I69.30 UNSPECIFIED SEQUELAE OF CEREBRAL INFARCTION: ICD-10-CM

## 2024-05-30 DIAGNOSIS — Z11.59 SCREENING FOR VIRAL DISEASE: ICD-10-CM

## 2024-05-30 DIAGNOSIS — E78.2 MIXED HYPERLIPIDEMIA: ICD-10-CM

## 2024-05-30 DIAGNOSIS — Z86.73 HISTORY OF CVA (CEREBROVASCULAR ACCIDENT): ICD-10-CM

## 2024-05-30 PROBLEM — I63.9 CVA (CEREBRAL VASCULAR ACCIDENT) (HCC): Status: RESOLVED | Noted: 2022-07-22 | Resolved: 2024-05-30

## 2024-05-30 LAB
ANION GAP SERPL CALC-SCNC: 4 MMOL/L (ref 5–15)
BUN SERPL-MCNC: 12 MG/DL (ref 6–20)
BUN/CREAT SERPL: 15 (ref 12–20)
CALCIUM SERPL-MCNC: 9.6 MG/DL (ref 8.5–10.1)
CHLORIDE SERPL-SCNC: 107 MMOL/L (ref 97–108)
CHOLEST SERPL-MCNC: 94 MG/DL
CO2 SERPL-SCNC: 29 MMOL/L (ref 21–32)
CREAT SERPL-MCNC: 0.82 MG/DL (ref 0.7–1.3)
GLUCOSE SERPL-MCNC: 92 MG/DL (ref 65–100)
HDLC SERPL-MCNC: 36 MG/DL
HDLC SERPL: 2.6 (ref 0–5)
LDLC SERPL CALC-MCNC: 32.4 MG/DL (ref 0–100)
POTASSIUM SERPL-SCNC: 3.9 MMOL/L (ref 3.5–5.1)
SODIUM SERPL-SCNC: 140 MMOL/L (ref 136–145)
TRIGL SERPL-MCNC: 128 MG/DL
VLDLC SERPL CALC-MCNC: 25.6 MG/DL

## 2024-05-30 RX ORDER — TADALAFIL 10 MG/1
10 TABLET ORAL DAILY PRN
Qty: 30 TABLET | Refills: 5 | Status: SHIPPED | OUTPATIENT
Start: 2024-05-30

## 2024-05-30 RX ORDER — ATORVASTATIN CALCIUM 40 MG/1
40 TABLET, FILM COATED ORAL NIGHTLY
Qty: 90 TABLET | Refills: 1 | Status: SHIPPED | OUTPATIENT
Start: 2024-05-30

## 2024-05-30 RX ORDER — BACLOFEN 20 MG/1
20 TABLET ORAL 3 TIMES DAILY PRN
Qty: 90 TABLET | Refills: 5 | Status: SHIPPED | OUTPATIENT
Start: 2024-05-30

## 2024-05-30 RX ORDER — AMLODIPINE BESYLATE 10 MG/1
10 TABLET ORAL DAILY
Qty: 90 TABLET | Refills: 1 | Status: SHIPPED | OUTPATIENT
Start: 2024-05-30

## 2024-05-30 RX ORDER — LOSARTAN POTASSIUM 50 MG/1
50 TABLET ORAL EVERY 12 HOURS
Qty: 180 TABLET | Refills: 1 | Status: SHIPPED | OUTPATIENT
Start: 2024-05-30

## 2024-05-30 RX ORDER — ASPIRIN 81 MG/1
81 TABLET, CHEWABLE ORAL DAILY
Qty: 90 TABLET | Refills: 1 | Status: SHIPPED | OUTPATIENT
Start: 2024-05-30

## 2024-05-30 RX ORDER — CLOPIDOGREL BISULFATE 75 MG/1
75 TABLET ORAL DAILY
Qty: 90 TABLET | Refills: 1 | Status: SHIPPED | OUTPATIENT
Start: 2024-05-30

## 2024-05-30 RX ORDER — TADALAFIL 5 MG/1
5 TABLET ORAL PRN
Qty: 30 TABLET | Refills: 5 | Status: SHIPPED | OUTPATIENT
Start: 2024-05-30 | End: 2024-05-30

## 2024-05-30 NOTE — PROGRESS NOTES
Kris Newman is a 58 y.o. male  Chief Complaint   Patient presents with    Annual Exam    Medication Refill     /65 (Site: Right Upper Arm, Position: Sitting, Cuff Size: Large Adult)   Pulse 65   Temp 96.8 °F (36 °C)   Resp 18   Ht 1.727 m (5' 8\")   Wt 97.2 kg (214 lb 3.2 oz)   SpO2 96%   BMI 32.57 kg/m²   \"Have you been to the ER, urgent care clinic since your last visit?  Hospitalized since your last visit?\"    NO    “Have you seen or consulted any other health care providers outside of [facility] since your last visit?”    NO        “Have you had a colorectal cancer screening such as a colonoscopy/FIT/Cologuard?    NO    No colonoscopy on file  No cologuard on file  No FIT/FOBT on file   No flexible sigmoidoscopy on file         Click Here for Release of Records Request    
Trent Today’s Date: 2024   MRN: [id number] Sex: Male   Age: 58 y.o. Ethnicity: Non- / Non    : 1965 Race: Black / African American      Kris Newman is here for well adult exam.  History:      Review of Systems    No Known Allergies      Prior to Visit Medications    Medication Sig Taking? Authorizing Provider   amLODIPine (NORVASC) 10 MG tablet Take 1 tablet by mouth daily Yes Jazz Kang APRN - NP   aspirin 81 MG chewable tablet Take 1 tablet by mouth daily Yes Jazz Kang APRN - NP   atorvastatin (LIPITOR) 40 MG tablet Take 1 tablet by mouth nightly Yes Jazz Kang APRN - DANY   baclofen (LIORESAL) 20 MG tablet Take 1 tablet by mouth 3 times daily as needed (muscle spasm) Yes Jazz Kang APRN - DANY   clopidogrel (PLAVIX) 75 MG tablet Take 1 tablet by mouth daily Yes Jazz Kang APRN - NP   losartan (COZAAR) 50 MG tablet Take 1 tablet by mouth in the morning and 1 tablet in the evening. Yes Jazz Kang APRN - NP   tadalafil (CIALIS) 10 MG tablet Take 1 tablet by mouth daily as needed for Erectile Dysfunction Yes Jazz Kang APRN - NP   fluticasone (FLONASE) 50 MCG/ACT nasal spray 2 sprays by Each Nostril route daily Yes Frederic Gandhi E, DO         Past Medical History:   Diagnosis Date    Cerebral infarction (HCC) 2012    CVA (cerebral vascular accident) (HCC) 2022    Hypertension     Stroke (HCC)        Past Surgical History:   Procedure Laterality Date    NEUROLOGICAL SURGERY           Family History   Problem Relation Age of Onset    Dementia Mother     Hypertension Father     Hypertension Mother        Social History     Tobacco Use    Smoking status: Never    Smokeless tobacco: Never   Vaping Use    Vaping Use: Never used   Substance Use Topics    Alcohol use: No    Drug use: No       Objective     Vital Signs  /65 (Site: Right Upper Arm, Position: Sitting, Cuff Size: Large Adult)   Pulse 65

## 2024-06-01 LAB
HCV AB SERPL QL IA: NORMAL
HCV IGG SERPL QL IA: NON REACTIVE S/CO RATIO

## 2024-12-21 ENCOUNTER — PATIENT MESSAGE (OUTPATIENT)
Facility: CLINIC | Age: 59
End: 2024-12-21

## 2024-12-21 DIAGNOSIS — N52.9 ERECTILE DYSFUNCTION, UNSPECIFIED ERECTILE DYSFUNCTION TYPE: ICD-10-CM

## 2024-12-23 NOTE — TELEPHONE ENCOUNTER
Last appointment: 05/30/2024 DANY Kang   Next appointment: 01/09/2025 DANY Kang   Previous refill encounter(s):   05/30/2024:  - Cozaar #180 with 1 refill,   - Lipitor #90 with 1 refill,   - Norvasc #90 with 1 refill.     For Pharmacy Admin Tracking Only    Program: Medication Refill  Intervention Detail: New Rx: 3, reason: Patient Preference  Time Spent (min): 5    Requested Prescriptions     Pending Prescriptions Disp Refills    amLODIPine (NORVASC) 10 MG tablet [Pharmacy Med Name: amLODIPine Besylate 10 MG Oral Tablet] 90 tablet 0     Sig: Take 1 tablet by mouth once daily    atorvastatin (LIPITOR) 40 MG tablet [Pharmacy Med Name: Atorvastatin Calcium 40 MG Oral Tablet] 90 tablet 0     Sig: Take 1 tablet by mouth nightly    losartan (COZAAR) 50 MG tablet [Pharmacy Med Name: Losartan Potassium 50 MG Oral Tablet] 180 tablet 0     Sig: TAKE 1 TABLET BY MOUTH IN THE MORNING AND 1 IN THE EVENING

## 2024-12-24 RX ORDER — AMLODIPINE BESYLATE 10 MG/1
10 TABLET ORAL DAILY
Qty: 90 TABLET | Refills: 0 | Status: SHIPPED | OUTPATIENT
Start: 2024-12-24

## 2024-12-24 RX ORDER — LOSARTAN POTASSIUM 50 MG/1
TABLET ORAL
Qty: 180 TABLET | Refills: 0 | Status: SHIPPED | OUTPATIENT
Start: 2024-12-24

## 2024-12-24 RX ORDER — ATORVASTATIN CALCIUM 40 MG/1
40 TABLET, FILM COATED ORAL NIGHTLY
Qty: 90 TABLET | Refills: 0 | Status: SHIPPED | OUTPATIENT
Start: 2024-12-24

## 2025-01-21 ENCOUNTER — OFFICE VISIT (OUTPATIENT)
Facility: CLINIC | Age: 60
End: 2025-01-21
Payer: MEDICARE

## 2025-01-21 VITALS
BODY MASS INDEX: 33.31 KG/M2 | OXYGEN SATURATION: 98 % | HEIGHT: 68 IN | SYSTOLIC BLOOD PRESSURE: 120 MMHG | WEIGHT: 219.8 LBS | HEART RATE: 60 BPM | DIASTOLIC BLOOD PRESSURE: 82 MMHG | RESPIRATION RATE: 18 BRPM | TEMPERATURE: 97 F

## 2025-01-21 DIAGNOSIS — E78.2 MIXED HYPERLIPIDEMIA: ICD-10-CM

## 2025-01-21 DIAGNOSIS — I10 PRIMARY HYPERTENSION: Primary | ICD-10-CM

## 2025-01-21 DIAGNOSIS — Z12.11 COLON CANCER SCREENING: ICD-10-CM

## 2025-01-21 DIAGNOSIS — Z86.73 HISTORY OF CVA (CEREBROVASCULAR ACCIDENT): ICD-10-CM

## 2025-01-21 DIAGNOSIS — N52.9 ERECTILE DYSFUNCTION, UNSPECIFIED ERECTILE DYSFUNCTION TYPE: ICD-10-CM

## 2025-01-21 PROCEDURE — 3074F SYST BP LT 130 MM HG: CPT | Performed by: NURSE PRACTITIONER

## 2025-01-21 PROCEDURE — 99214 OFFICE O/P EST MOD 30 MIN: CPT | Performed by: NURSE PRACTITIONER

## 2025-01-21 PROCEDURE — 3079F DIAST BP 80-89 MM HG: CPT | Performed by: NURSE PRACTITIONER

## 2025-01-21 RX ORDER — SILDENAFIL 50 MG/1
50 TABLET, FILM COATED ORAL DAILY PRN
Qty: 20 TABLET | Refills: 0 | Status: SHIPPED | OUTPATIENT
Start: 2025-01-21

## 2025-01-21 SDOH — ECONOMIC STABILITY: FOOD INSECURITY: WITHIN THE PAST 12 MONTHS, THE FOOD YOU BOUGHT JUST DIDN'T LAST AND YOU DIDN'T HAVE MONEY TO GET MORE.: NEVER TRUE

## 2025-01-21 SDOH — ECONOMIC STABILITY: FOOD INSECURITY: WITHIN THE PAST 12 MONTHS, YOU WORRIED THAT YOUR FOOD WOULD RUN OUT BEFORE YOU GOT MONEY TO BUY MORE.: NEVER TRUE

## 2025-01-21 ASSESSMENT — PATIENT HEALTH QUESTIONNAIRE - PHQ9
SUM OF ALL RESPONSES TO PHQ9 QUESTIONS 1 & 2: 0
SUM OF ALL RESPONSES TO PHQ QUESTIONS 1-9: 0
SUM OF ALL RESPONSES TO PHQ QUESTIONS 1-9: 0
2. FEELING DOWN, DEPRESSED OR HOPELESS: NOT AT ALL
1. LITTLE INTEREST OR PLEASURE IN DOING THINGS: NOT AT ALL
SUM OF ALL RESPONSES TO PHQ QUESTIONS 1-9: 0
SUM OF ALL RESPONSES TO PHQ QUESTIONS 1-9: 0

## 2025-01-21 NOTE — PROGRESS NOTES
\"Have you been to the ER, urgent care clinic since your last visit?  Hospitalized since your last visit?\"    NO    “Have you seen or consulted any other health care providers outside our system since your last visit?”    NO      “Have you had a colorectal cancer screening such as a colonoscopy/FIT/Cologuard?    NO    No colonoscopy on file  No cologuard on file  No FIT/FOBT on file   No flexible sigmoidoscopy on file          Chief Complaint   Patient presents with    6 Month Follow-Up    Hypertension     /82 (Site: Left Upper Arm, Position: Sitting, Cuff Size: Medium Adult)   Pulse 60   Temp 97 °F (36.1 °C) (Temporal)   Resp 18   Ht 1.727 m (5' 8\")   Wt 99.7 kg (219 lb 12.8 oz)   SpO2 98%   BMI 33.42 kg/m²     
and atraumatic.    Eyes:  EOM are normal. Pupils equal and round.    Neck:  Normal range of motion.  Neck supple.    Cardiovascular: normal rate, regular rhythm and normal heart sounds.   Pulmonary/Chest:  Effort normal and breath sounds normal.  No respiratory distress.  No wheezes, no rales.   Abdominal: soft, normal  bowel sounds.   Musculoskeletal:  No edema, no tenderness.  No calf tenderness or edema.   Neurological:  Alert, oriented to person, place and time.    Skin: skin is warm and dry.       Assessment/ Plan:     Follow-up and Dispositions    Return in about 6 months (around 7/21/2025) for HTN and AWV.        Diagnosis Orders   1. Primary hypertension  Comprehensive Metabolic Panel      2. History of CVA (cerebrovascular accident)  Comprehensive Metabolic Panel      3. Erectile dysfunction, unspecified erectile dysfunction type  sildenafil (VIAGRA) 50 MG tablet      4. Mixed hyperlipidemia        5. Colon cancer screening  [facility] - Colby Adam MD, Gastroenterology, [place] ([facility])              I have discussed the diagnosis with the patient and the intended plan as seen in the above orders.  The patient has received an after-visit summary and questions were answered concerning future plans.  Pt conveyed understanding of plan.      Medication Side Effects and Warnings were discussed with patient: Yes  Patient Labs were reviewed: Yes  Patient Past Records were reviewed:  Yes    YOSSI Chakraborty NP

## 2025-02-04 ENCOUNTER — OFFICE VISIT (OUTPATIENT)
Age: 60
End: 2025-02-04
Payer: MEDICARE

## 2025-02-04 VITALS
HEART RATE: 73 BPM | SYSTOLIC BLOOD PRESSURE: 126 MMHG | OXYGEN SATURATION: 99 % | WEIGHT: 219 LBS | BODY MASS INDEX: 33.19 KG/M2 | HEIGHT: 68 IN | RESPIRATION RATE: 17 BRPM | DIASTOLIC BLOOD PRESSURE: 82 MMHG

## 2025-02-04 DIAGNOSIS — R47.01 EXPRESSIVE APHASIA: ICD-10-CM

## 2025-02-04 DIAGNOSIS — Z86.73 HISTORY OF CVA (CEREBROVASCULAR ACCIDENT): ICD-10-CM

## 2025-02-04 DIAGNOSIS — I69.30 UNSPECIFIED SEQUELAE OF CEREBRAL INFARCTION: ICD-10-CM

## 2025-02-04 DIAGNOSIS — I69.951 HEMIPLEGIA AND HEMIPARESIS FOLLOWING UNSPECIFIED CEREBROVASCULAR DISEASE AFFECTING RIGHT DOMINANT SIDE (HCC): Primary | ICD-10-CM

## 2025-02-04 PROCEDURE — 99215 OFFICE O/P EST HI 40 MIN: CPT

## 2025-02-04 RX ORDER — BACLOFEN 20 MG/1
20 TABLET ORAL 3 TIMES DAILY PRN
Qty: 180 TABLET | Refills: 2 | Status: SHIPPED | OUTPATIENT
Start: 2025-02-04

## 2025-02-04 RX ORDER — CLOPIDOGREL BISULFATE 75 MG/1
75 TABLET ORAL DAILY
Qty: 90 TABLET | Refills: 1 | Status: SHIPPED | OUTPATIENT
Start: 2025-02-04

## 2025-02-04 ASSESSMENT — PATIENT HEALTH QUESTIONNAIRE - PHQ9
2. FEELING DOWN, DEPRESSED OR HOPELESS: NOT AT ALL
SUM OF ALL RESPONSES TO PHQ QUESTIONS 1-9: 0
1. LITTLE INTEREST OR PLEASURE IN DOING THINGS: NOT AT ALL
SUM OF ALL RESPONSES TO PHQ QUESTIONS 1-9: 0
SUM OF ALL RESPONSES TO PHQ9 QUESTIONS 1 & 2: 0

## 2025-02-04 ASSESSMENT — ENCOUNTER SYMPTOMS: PHOTOPHOBIA: 0

## 2025-02-05 LAB
ALBUMIN SERPL-MCNC: 4.1 G/DL (ref 3.9–4.9)
ALP SERPL-CCNC: 87 IU/L (ref 44–121)
ALT SERPL-CCNC: 17 IU/L (ref 0–44)
AST SERPL-CCNC: 19 IU/L (ref 0–40)
BILIRUB SERPL-MCNC: 0.5 MG/DL (ref 0–1.2)
BUN SERPL-MCNC: 10 MG/DL (ref 8–27)
BUN/CREAT SERPL: 12 (ref 10–24)
CALCIUM SERPL-MCNC: 9.1 MG/DL (ref 8.6–10.2)
CHLORIDE SERPL-SCNC: 105 MMOL/L (ref 96–106)
CO2 SERPL-SCNC: 23 MMOL/L (ref 20–29)
CREAT SERPL-MCNC: 0.81 MG/DL (ref 0.76–1.27)
EGFRCR SERPLBLD CKD-EPI 2021: 100 ML/MIN/1.73
GLOBULIN SER CALC-MCNC: 3 G/DL (ref 1.5–4.5)
GLUCOSE SERPL-MCNC: 116 MG/DL (ref 70–99)
POTASSIUM SERPL-SCNC: 3.7 MMOL/L (ref 3.5–5.2)
PROT SERPL-MCNC: 7.1 G/DL (ref 6–8.5)
SODIUM SERPL-SCNC: 142 MMOL/L (ref 134–144)

## 2025-04-01 RX ORDER — AMLODIPINE BESYLATE 10 MG/1
10 TABLET ORAL DAILY
Qty: 90 TABLET | Refills: 0 | Status: SHIPPED | OUTPATIENT
Start: 2025-04-01

## 2025-04-01 RX ORDER — LOSARTAN POTASSIUM 50 MG/1
TABLET ORAL
Qty: 180 TABLET | Refills: 0 | Status: SHIPPED | OUTPATIENT
Start: 2025-04-01

## 2025-04-01 RX ORDER — ATORVASTATIN CALCIUM 40 MG/1
40 TABLET, FILM COATED ORAL NIGHTLY
Qty: 90 TABLET | Refills: 0 | Status: SHIPPED | OUTPATIENT
Start: 2025-04-01

## 2025-04-15 ENCOUNTER — TELEPHONE (OUTPATIENT)
Age: 60
End: 2025-04-15

## 2025-06-22 DIAGNOSIS — I69.30 UNSPECIFIED SEQUELAE OF CEREBRAL INFARCTION: ICD-10-CM

## 2025-06-22 DIAGNOSIS — Z86.73 HISTORY OF CVA (CEREBROVASCULAR ACCIDENT): ICD-10-CM

## 2025-06-22 DIAGNOSIS — I69.951 HEMIPLEGIA AND HEMIPARESIS FOLLOWING UNSPECIFIED CEREBROVASCULAR DISEASE AFFECTING RIGHT DOMINANT SIDE (HCC): ICD-10-CM

## 2025-06-23 RX ORDER — LOSARTAN POTASSIUM 50 MG/1
TABLET ORAL
Qty: 180 TABLET | Refills: 0 | Status: SHIPPED | OUTPATIENT
Start: 2025-06-23

## 2025-06-23 RX ORDER — AMLODIPINE BESYLATE 10 MG/1
10 TABLET ORAL DAILY
Qty: 90 TABLET | Refills: 0 | Status: SHIPPED | OUTPATIENT
Start: 2025-06-23

## 2025-06-23 RX ORDER — ATORVASTATIN CALCIUM 40 MG/1
40 TABLET, FILM COATED ORAL NIGHTLY
Qty: 90 TABLET | Refills: 0 | Status: SHIPPED | OUTPATIENT
Start: 2025-06-23

## 2025-06-24 ENCOUNTER — PATIENT MESSAGE (OUTPATIENT)
Facility: CLINIC | Age: 60
End: 2025-06-24

## 2025-06-24 DIAGNOSIS — Z86.73 HISTORY OF CVA (CEREBROVASCULAR ACCIDENT): ICD-10-CM

## 2025-06-24 DIAGNOSIS — I69.951 HEMIPLEGIA AND HEMIPARESIS FOLLOWING UNSPECIFIED CEREBROVASCULAR DISEASE AFFECTING RIGHT DOMINANT SIDE (HCC): ICD-10-CM

## 2025-06-24 DIAGNOSIS — I69.30 UNSPECIFIED SEQUELAE OF CEREBRAL INFARCTION: ICD-10-CM

## 2025-06-24 RX ORDER — BACLOFEN 20 MG/1
20 TABLET ORAL 3 TIMES DAILY PRN
Qty: 180 TABLET | Refills: 2 | OUTPATIENT
Start: 2025-06-24

## 2025-06-26 RX ORDER — LOSARTAN POTASSIUM 50 MG/1
TABLET ORAL
Qty: 180 TABLET | Refills: 0 | OUTPATIENT
Start: 2025-06-26

## 2025-06-26 RX ORDER — ATORVASTATIN CALCIUM 40 MG/1
40 TABLET, FILM COATED ORAL NIGHTLY
Qty: 90 TABLET | Refills: 0 | OUTPATIENT
Start: 2025-06-26

## 2025-06-26 RX ORDER — AMLODIPINE BESYLATE 10 MG/1
10 TABLET ORAL DAILY
Qty: 90 TABLET | Refills: 0 | OUTPATIENT
Start: 2025-06-26

## 2025-07-07 ENCOUNTER — OFFICE VISIT (OUTPATIENT)
Age: 60
End: 2025-07-07

## 2025-07-07 VITALS
DIASTOLIC BLOOD PRESSURE: 74 MMHG | TEMPERATURE: 97.6 F | WEIGHT: 219 LBS | HEART RATE: 90 BPM | OXYGEN SATURATION: 99 % | SYSTOLIC BLOOD PRESSURE: 128 MMHG | BODY MASS INDEX: 33.3 KG/M2 | RESPIRATION RATE: 20 BRPM

## 2025-07-07 DIAGNOSIS — J18.9 COMMUNITY ACQUIRED PNEUMONIA, UNSPECIFIED LATERALITY: Primary | ICD-10-CM

## 2025-07-07 DIAGNOSIS — R05.1 ACUTE COUGH: ICD-10-CM

## 2025-07-07 RX ORDER — BACLOFEN 20 MG/1
20 TABLET ORAL 3 TIMES DAILY PRN
Qty: 180 TABLET | Refills: 2 | Status: SHIPPED | OUTPATIENT
Start: 2025-07-07

## 2025-07-07 NOTE — PATIENT INSTRUCTIONS
Exam and history consistent with community acquired pneumonia.   -X-ray demonstrates bibasilar pneumonia as independently interpreted by me. X-ray will be overread by a radiologist and patient will be called if treatment plan needs to be adjusted.   -Augmentin twice a day for 7 days   -Increase fluid intake to maintain hydration and to help fight infection. Please drink at least 64 ounces of fluid a day  -Ibuprofen 600 mg every 6 hours as needed and Tylenol 500 mg every 6 hours as needed for fever/pain  -Mucinex DM, Tylenol severe cold and flu, or DayQuil for symptomatic relief and decongestion  -1 tablespoon of honey or mixed with hot tea for help with cough  -Steam inhalation, warm compresses, humidifier, and warm soup can also help  -Please follow-up with your PCP in the next 2 to 4 weeks    If symptoms persist or worsen, please contact PCP and/or return to urgent care.

## 2025-07-07 NOTE — PROGRESS NOTES
MOUTH IN THE MORNING AND 1 IN THE EVENING 6/23/25   Guerline Georges I, APRN - NP   baclofen (LIORESAL) 20 MG tablet Take 1 tablet by mouth 3 times daily as needed (muscle spasm) 2/4/25   Guille Nicolas APRN - NP   clopidogrel (PLAVIX) 75 MG tablet Take 1 tablet by mouth daily 2/4/25   Guille Nicolas APRN - NP   sildenafil (VIAGRA) 50 MG tablet Take 1 tablet by mouth daily as needed for Erectile Dysfunction 1/21/25   Jazz Kang APRN - NP   aspirin 81 MG chewable tablet Take 1 tablet by mouth daily 5/30/24   Jazz Kang APRN - NP        Past Medical History:   Diagnosis Date    Cerebral infarction (HCC) 08/04/2012    CVA (cerebral vascular accident) (HCC) 07/22/2022    Hypertension     Stroke (HCC)         Past Surgical History:   Procedure Laterality Date    NEUROLOGICAL SURGERY          Social History:   Social Connections: Not on file        Patient Care Team:  Jazz Kang APRN - NP as PCP - General  Jazz Kang APRN - NP as PCP - Empaneled Provider    Patient Active Problem List   Diagnosis    Dysarthria    Tobacco abuse    Alcohol abuse    Leg injury    Erectile dysfunction    History of CVA (cerebrovascular accident)            I ADVISED PATIENT TO GO TO ER IF SYMPTOMS WORSEN , CHANGE OR FAILS TO IMPROVE.    I have discussed the diagnosis with the patient and the intended plan as seen in the above orders.  The patient has received an after-visit summary and questions were answered concerning future plans.  I have discussed medication side effects and warnings with the patient as well. The patient agrees and understands above plan.       An electronic signature was used to authenticate this note.  -- Tesfaye Small MD

## 2025-07-21 ENCOUNTER — OFFICE VISIT (OUTPATIENT)
Facility: CLINIC | Age: 60
End: 2025-07-21
Payer: MEDICARE

## 2025-07-21 VITALS
RESPIRATION RATE: 18 BRPM | BODY MASS INDEX: 32.87 KG/M2 | TEMPERATURE: 98.4 F | HEIGHT: 68 IN | DIASTOLIC BLOOD PRESSURE: 68 MMHG | WEIGHT: 216.9 LBS | HEART RATE: 70 BPM | SYSTOLIC BLOOD PRESSURE: 126 MMHG | OXYGEN SATURATION: 98 %

## 2025-07-21 DIAGNOSIS — N52.9 ERECTILE DYSFUNCTION, UNSPECIFIED ERECTILE DYSFUNCTION TYPE: ICD-10-CM

## 2025-07-21 DIAGNOSIS — Z12.11 COLON CANCER SCREENING: ICD-10-CM

## 2025-07-21 DIAGNOSIS — R73.09 ELEVATED GLUCOSE: ICD-10-CM

## 2025-07-21 DIAGNOSIS — E78.2 MIXED HYPERLIPIDEMIA: ICD-10-CM

## 2025-07-21 DIAGNOSIS — Z86.73 HISTORY OF CVA (CEREBROVASCULAR ACCIDENT): Primary | ICD-10-CM

## 2025-07-21 DIAGNOSIS — I10 PRIMARY HYPERTENSION: ICD-10-CM

## 2025-07-21 DIAGNOSIS — Z86.73 HISTORY OF CVA (CEREBROVASCULAR ACCIDENT): ICD-10-CM

## 2025-07-21 DIAGNOSIS — Z00.00 WELCOME TO MEDICARE PREVENTIVE VISIT: ICD-10-CM

## 2025-07-21 DIAGNOSIS — R35.1 NOCTURIA: ICD-10-CM

## 2025-07-21 PROCEDURE — 99214 OFFICE O/P EST MOD 30 MIN: CPT | Performed by: NURSE PRACTITIONER

## 2025-07-21 PROCEDURE — 3074F SYST BP LT 130 MM HG: CPT | Performed by: NURSE PRACTITIONER

## 2025-07-21 PROCEDURE — G0439 PPPS, SUBSEQ VISIT: HCPCS | Performed by: NURSE PRACTITIONER

## 2025-07-21 PROCEDURE — 3078F DIAST BP <80 MM HG: CPT | Performed by: NURSE PRACTITIONER

## 2025-07-21 RX ORDER — SILDENAFIL 100 MG/1
100 TABLET, FILM COATED ORAL DAILY PRN
Qty: 30 TABLET | Refills: 3 | Status: SHIPPED | OUTPATIENT
Start: 2025-07-21

## 2025-07-21 ASSESSMENT — PATIENT HEALTH QUESTIONNAIRE - PHQ9
2. FEELING DOWN, DEPRESSED OR HOPELESS: NOT AT ALL
SUM OF ALL RESPONSES TO PHQ QUESTIONS 1-9: 0
1. LITTLE INTEREST OR PLEASURE IN DOING THINGS: NOT AT ALL
SUM OF ALL RESPONSES TO PHQ QUESTIONS 1-9: 0

## 2025-07-21 ASSESSMENT — LIFESTYLE VARIABLES
HOW OFTEN DO YOU HAVE A DRINK CONTAINING ALCOHOL: MONTHLY OR LESS
HOW MANY STANDARD DRINKS CONTAINING ALCOHOL DO YOU HAVE ON A TYPICAL DAY: PATIENT DOES NOT DRINK

## 2025-07-21 NOTE — PROGRESS NOTES
Kris Newman is a 59 y.o. male  Have you been to the ER, urgent care clinic since your last visit?  Hospitalized since your last visit?   NO    Have you seen or consulted any other health care providers outside our system since your last visit?   NO      “Have you had a colorectal cancer screening such as a colonoscopy/FIT/Cologuard?    NO    No colonoscopy on file  No cologuard on file  No FIT/FOBT on file   No flexible sigmoidoscopy on file          Chief Complaint   Patient presents with    Medicare AW    6 Month Follow-Up    Hypertension     /68 (BP Site: Left Upper Arm, Patient Position: Sitting, BP Cuff Size: Medium Adult)   Pulse 70   Temp 98.4 °F (36.9 °C) (Temporal)   Resp 18   Ht 1.727 m (5' 8\")   Wt 98.4 kg (216 lb 14.4 oz)   SpO2 98%   BMI 32.98 kg/m²

## 2025-07-21 NOTE — PROGRESS NOTES
Subjective: (As above and below)     Chief Complaint   Patient presents with    Medicare AWV    6 Month Follow-Up    Hypertension     Kris Newman is a 59 y.o. year old male who presents for     Hypertension ROS:  taking medications as instructed, no medication side effects noted, no TIAs, no chest pain on exertion, no dyspnea on exertion, no swelling of ankles    Hyperlpidemia tolerating statin    Hx of CVA on statin and Plavix    Prostatic s/s 4-5 x per night, voiding  No other issues with urinary stream    ED: 50mg is ineffective    Wt Readings from Last 3 Encounters:   07/21/25 98.4 kg (216 lb 14.4 oz)   07/07/25 99.3 kg (219 lb)   02/04/25 99.3 kg (219 lb)       BP Readings from Last 3 Encounters:   07/21/25 126/68   07/07/25 128/74   02/04/25 126/82     Treated for pneumonia on 7/7; he is feeling    Reviewed PmHx, RxHx, FmHx, SocHx, AllgHx and updated in chart.  Family History   Problem Relation Age of Onset    Dementia Mother     Hypertension Father     Hypertension Mother        Past Medical History:   Diagnosis Date    Cerebral infarction (HCC) 08/04/2012    CVA (cerebral vascular accident) (HCC) 07/22/2022    Hypertension     Stroke (HCC)       Social History     Socioeconomic History    Marital status:      Spouse name: None    Number of children: None    Years of education: None    Highest education level: None   Tobacco Use    Smoking status: Never    Smokeless tobacco: Never   Vaping Use    Vaping status: Never Used   Substance and Sexual Activity    Alcohol use: No    Drug use: No     Social Drivers of Health     Financial Resource Strain: Low Risk  (8/9/2023)    Overall Financial Resource Strain (CARDIA)     Difficulty of Paying Living Expenses: Not hard at all   Food Insecurity: No Food Insecurity (1/21/2025)    Hunger Vital Sign     Worried About Running Out of Food in the Last Year: Never true     Ran Out of Food in the Last Year: Never true   Transportation Needs: No Transportation Needs

## 2025-07-23 LAB
BUN SERPL-MCNC: 10 MG/DL (ref 8–27)
BUN/CREAT SERPL: 12 (ref 10–24)
CALCIUM SERPL-MCNC: 9.4 MG/DL (ref 8.6–10.2)
CHLORIDE SERPL-SCNC: 100 MMOL/L (ref 96–106)
CHOLEST SERPL-MCNC: 89 MG/DL (ref 100–199)
CO2 SERPL-SCNC: 24 MMOL/L (ref 20–29)
CREAT SERPL-MCNC: 0.85 MG/DL (ref 0.76–1.27)
EGFRCR SERPLBLD CKD-EPI 2021: 99 ML/MIN/1.73
ERYTHROCYTE [DISTWIDTH] IN BLOOD BY AUTOMATED COUNT: 12.6 % (ref 11.6–15.4)
GLUCOSE SERPL-MCNC: 80 MG/DL (ref 70–99)
HCT VFR BLD AUTO: 42.6 % (ref 37.5–51)
HDLC SERPL-MCNC: 37 MG/DL
HGB BLD-MCNC: 14.1 G/DL (ref 13–17.7)
IMP & REVIEW OF LAB RESULTS: NORMAL
LDLC SERPL CALC-MCNC: 29 MG/DL (ref 0–99)
MCH RBC QN AUTO: 32.6 PG (ref 26.6–33)
MCHC RBC AUTO-ENTMCNC: 33.1 G/DL (ref 31.5–35.7)
MCV RBC AUTO: 98 FL (ref 79–97)
PLATELET # BLD AUTO: 251 X10E3/UL (ref 150–450)
POTASSIUM SERPL-SCNC: 4.5 MMOL/L (ref 3.5–5.2)
PSA SERPL-MCNC: 1.3 NG/ML (ref 0–4)
RBC # BLD AUTO: 4.33 X10E6/UL (ref 4.14–5.8)
REFLEX CRITERIA: NORMAL
SODIUM SERPL-SCNC: 138 MMOL/L (ref 134–144)
TRIGL SERPL-MCNC: 132 MG/DL (ref 0–149)
VLDLC SERPL CALC-MCNC: 23 MG/DL (ref 5–40)
WBC # BLD AUTO: 6 X10E3/UL (ref 3.4–10.8)

## 2025-07-25 ENCOUNTER — RESULTS FOLLOW-UP (OUTPATIENT)
Facility: CLINIC | Age: 60
End: 2025-07-25

## 2025-08-14 ENCOUNTER — ANESTHESIA EVENT (OUTPATIENT)
Facility: HOSPITAL | Age: 60
End: 2025-08-14
Payer: MEDICARE

## 2025-08-14 ENCOUNTER — ANESTHESIA (OUTPATIENT)
Facility: HOSPITAL | Age: 60
End: 2025-08-14
Payer: MEDICARE

## 2025-08-14 ENCOUNTER — HOSPITAL ENCOUNTER (OUTPATIENT)
Facility: HOSPITAL | Age: 60
Setting detail: OUTPATIENT SURGERY
Discharge: HOME | End: 2025-08-14
Attending: INTERNAL MEDICINE | Admitting: INTERNAL MEDICINE
Payer: MEDICARE

## 2025-08-14 VITALS
OXYGEN SATURATION: 94 % | TEMPERATURE: 97.6 F | WEIGHT: 213.1 LBS | SYSTOLIC BLOOD PRESSURE: 118 MMHG | HEART RATE: 52 BPM | DIASTOLIC BLOOD PRESSURE: 78 MMHG | HEIGHT: 68 IN | RESPIRATION RATE: 16 BRPM | BODY MASS INDEX: 32.3 KG/M2

## 2025-08-14 PROCEDURE — 7100000010 HC PHASE II RECOVERY - FIRST 15 MIN: Performed by: INTERNAL MEDICINE

## 2025-08-14 PROCEDURE — 2580000003 HC RX 258: Performed by: NURSE ANESTHETIST, CERTIFIED REGISTERED

## 2025-08-14 PROCEDURE — 3600007502: Performed by: INTERNAL MEDICINE

## 2025-08-14 PROCEDURE — 3700000000 HC ANESTHESIA ATTENDED CARE: Performed by: INTERNAL MEDICINE

## 2025-08-14 PROCEDURE — 3700000001 HC ADD 15 MINUTES (ANESTHESIA): Performed by: INTERNAL MEDICINE

## 2025-08-14 PROCEDURE — 3600007512: Performed by: INTERNAL MEDICINE

## 2025-08-14 PROCEDURE — 7100000011 HC PHASE II RECOVERY - ADDTL 15 MIN: Performed by: INTERNAL MEDICINE

## 2025-08-14 PROCEDURE — 2709999900 HC NON-CHARGEABLE SUPPLY: Performed by: INTERNAL MEDICINE

## 2025-08-14 PROCEDURE — 88305 TISSUE EXAM BY PATHOLOGIST: CPT

## 2025-08-14 PROCEDURE — 6360000002 HC RX W HCPCS: Performed by: NURSE ANESTHETIST, CERTIFIED REGISTERED

## 2025-08-14 RX ORDER — SODIUM CHLORIDE 0.9 % (FLUSH) 0.9 %
5-40 SYRINGE (ML) INJECTION EVERY 12 HOURS SCHEDULED
Status: DISCONTINUED | OUTPATIENT
Start: 2025-08-14 | End: 2025-08-14 | Stop reason: HOSPADM

## 2025-08-14 RX ORDER — SODIUM CHLORIDE 9 MG/ML
INJECTION, SOLUTION INTRAVENOUS CONTINUOUS
Status: DISCONTINUED | OUTPATIENT
Start: 2025-08-14 | End: 2025-08-14 | Stop reason: HOSPADM

## 2025-08-14 RX ORDER — SODIUM CHLORIDE 0.9 % (FLUSH) 0.9 %
5-40 SYRINGE (ML) INJECTION PRN
Status: DISCONTINUED | OUTPATIENT
Start: 2025-08-14 | End: 2025-08-14 | Stop reason: HOSPADM

## 2025-08-14 RX ORDER — SODIUM CHLORIDE 9 MG/ML
INJECTION, SOLUTION INTRAVENOUS PRN
Status: DISCONTINUED | OUTPATIENT
Start: 2025-08-14 | End: 2025-08-14 | Stop reason: HOSPADM

## 2025-08-14 RX ORDER — SODIUM CHLORIDE 9 MG/ML
INJECTION, SOLUTION INTRAVENOUS
Status: DISCONTINUED | OUTPATIENT
Start: 2025-08-14 | End: 2025-08-14 | Stop reason: SDUPTHER

## 2025-08-14 RX ADMIN — PROPOFOL 50 MG: 10 INJECTION, EMULSION INTRAVENOUS at 14:14

## 2025-08-14 RX ADMIN — PROPOFOL 20 MG: 10 INJECTION, EMULSION INTRAVENOUS at 14:12

## 2025-08-14 RX ADMIN — PROPOFOL 50 MG: 10 INJECTION, EMULSION INTRAVENOUS at 14:10

## 2025-08-14 RX ADMIN — PROPOFOL 30 MG: 10 INJECTION, EMULSION INTRAVENOUS at 14:18

## 2025-08-14 RX ADMIN — SODIUM CHLORIDE: 9 INJECTION, SOLUTION INTRAVENOUS at 13:59

## 2025-08-14 RX ADMIN — PROPOFOL 30 MG: 10 INJECTION, EMULSION INTRAVENOUS at 14:11

## 2025-08-14 RX ADMIN — PROPOFOL 100 MG: 10 INJECTION, EMULSION INTRAVENOUS at 14:04

## 2025-08-14 RX ADMIN — PROPOFOL 50 MG: 10 INJECTION, EMULSION INTRAVENOUS at 14:07

## 2025-08-14 RX ADMIN — PROPOFOL 40 MG: 10 INJECTION, EMULSION INTRAVENOUS at 14:16

## 2025-08-14 RX ADMIN — PROPOFOL 30 MG: 10 INJECTION, EMULSION INTRAVENOUS at 14:21

## 2025-08-14 ASSESSMENT — PAIN SCALES - GENERAL: PAINLEVEL_OUTOF10: 0

## (undated) DEVICE — ELECTRODE PT RET AD L9FT HI MOIST COND ADH HYDRGEL CORDED

## (undated) DEVICE — FORCEPS BX L240CM JAW DIA2.4MM ORNG L CAP W/ NDL DISP RAD

## (undated) DEVICE — SNARE POLYP SM AD W13MMXL240CM SHTH DIA2.4MM HEX STIFF